# Patient Record
Sex: FEMALE | Race: WHITE | NOT HISPANIC OR LATINO | Employment: OTHER | URBAN - METROPOLITAN AREA
[De-identification: names, ages, dates, MRNs, and addresses within clinical notes are randomized per-mention and may not be internally consistent; named-entity substitution may affect disease eponyms.]

---

## 2017-03-10 ENCOUNTER — TRANSCRIBE ORDERS (OUTPATIENT)
Dept: ADMINISTRATIVE | Facility: HOSPITAL | Age: 82
End: 2017-03-10

## 2017-03-10 DIAGNOSIS — Z13.9 SCREENING: Primary | ICD-10-CM

## 2017-03-15 ENCOUNTER — APPOINTMENT (OUTPATIENT)
Dept: CARDIOLOGY | Facility: HOSPITAL | Age: 82
End: 2017-03-15
Attending: INTERNAL MEDICINE

## 2017-05-15 ENCOUNTER — OFFICE VISIT (OUTPATIENT)
Dept: CARDIOLOGY | Facility: CLINIC | Age: 82
End: 2017-05-15

## 2017-05-15 VITALS
BODY MASS INDEX: 27.15 KG/M2 | WEIGHT: 173 LBS | DIASTOLIC BLOOD PRESSURE: 71 MMHG | HEART RATE: 69 BPM | SYSTOLIC BLOOD PRESSURE: 137 MMHG | HEIGHT: 67 IN

## 2017-05-15 DIAGNOSIS — I48.91 ATRIAL FIBRILLATION AND FLUTTER (HCC): ICD-10-CM

## 2017-05-15 DIAGNOSIS — M54.5 CHRONIC BILATERAL LOW BACK PAIN, WITH SCIATICA PRESENCE UNSPECIFIED: ICD-10-CM

## 2017-05-15 DIAGNOSIS — R07.2 PRECORDIAL PAIN: ICD-10-CM

## 2017-05-15 DIAGNOSIS — G89.29 CHRONIC BILATERAL LOW BACK PAIN, WITH SCIATICA PRESENCE UNSPECIFIED: ICD-10-CM

## 2017-05-15 DIAGNOSIS — M54.2 CHRONIC NECK PAIN: ICD-10-CM

## 2017-05-15 DIAGNOSIS — K21.9 GASTROESOPHAGEAL REFLUX DISEASE WITHOUT ESOPHAGITIS: ICD-10-CM

## 2017-05-15 DIAGNOSIS — R07.9 CHEST PAIN AT REST: Primary | ICD-10-CM

## 2017-05-15 DIAGNOSIS — R06.02 SHORTNESS OF BREATH: ICD-10-CM

## 2017-05-15 DIAGNOSIS — I25.10 CAD IN NATIVE ARTERY: ICD-10-CM

## 2017-05-15 DIAGNOSIS — R49.0 HOARSENESS: ICD-10-CM

## 2017-05-15 DIAGNOSIS — M79.602 LEFT ARM PAIN: ICD-10-CM

## 2017-05-15 DIAGNOSIS — R05.9 COUGH: ICD-10-CM

## 2017-05-15 DIAGNOSIS — I49.3 VENTRICULAR PREMATURE BEATS: ICD-10-CM

## 2017-05-15 DIAGNOSIS — R42 DIZZINESS: ICD-10-CM

## 2017-05-15 DIAGNOSIS — I48.0 PAROXYSMAL ATRIAL FIBRILLATION (HCC): ICD-10-CM

## 2017-05-15 DIAGNOSIS — E04.9 GOITER: ICD-10-CM

## 2017-05-15 DIAGNOSIS — G89.29 CHRONIC NECK PAIN: ICD-10-CM

## 2017-05-15 DIAGNOSIS — I10 ESSENTIAL HYPERTENSION: ICD-10-CM

## 2017-05-15 DIAGNOSIS — R00.2 PALPITATIONS: ICD-10-CM

## 2017-05-15 DIAGNOSIS — I48.92 ATRIAL FIBRILLATION AND FLUTTER (HCC): ICD-10-CM

## 2017-05-15 PROBLEM — M75.40 IMPINGEMENT SYNDROME OF SHOULDER REGION: Status: ACTIVE | Noted: 2017-03-22

## 2017-05-15 PROBLEM — R29.898 OTHER SYMPTOMS AND SIGNS INVOLVING THE MUSCULOSKELETAL SYSTEM: Status: ACTIVE | Noted: 2017-03-02

## 2017-05-15 PROBLEM — R20.2 PARESTHESIA OF LOWER EXTREMITY: Status: ACTIVE | Noted: 2017-03-02

## 2017-05-15 PROCEDURE — 93000 ELECTROCARDIOGRAM COMPLETE: CPT | Performed by: INTERNAL MEDICINE

## 2017-05-15 PROCEDURE — 99214 OFFICE O/P EST MOD 30 MIN: CPT | Performed by: INTERNAL MEDICINE

## 2017-06-07 ENCOUNTER — TRANSCRIBE ORDERS (OUTPATIENT)
Dept: ADMINISTRATIVE | Facility: HOSPITAL | Age: 82
End: 2017-06-07

## 2017-06-07 DIAGNOSIS — R13.10 DYSPHAGIA, UNSPECIFIED TYPE: Primary | ICD-10-CM

## 2017-06-12 ENCOUNTER — HOSPITAL ENCOUNTER (OUTPATIENT)
Dept: GENERAL RADIOLOGY | Facility: HOSPITAL | Age: 82
Discharge: HOME | End: 2017-06-12
Attending: OTOLARYNGOLOGY | Admitting: OTOLARYNGOLOGY

## 2017-06-12 DIAGNOSIS — R13.10 DYSPHAGIA, UNSPECIFIED TYPE: ICD-10-CM

## 2017-06-12 PROCEDURE — 92611 MOTION FLUOROSCOPY/SWALLOW: CPT

## 2017-06-12 PROCEDURE — G8996 SWALLOW CURRENT STATUS: HCPCS

## 2017-06-12 PROCEDURE — G8998 SWALLOW D/C STATUS: HCPCS

## 2017-06-12 PROCEDURE — G8997 SWALLOW GOAL STATUS: HCPCS

## 2017-06-12 PROCEDURE — 74230 X-RAY XM SWLNG FUNCJ C+: CPT

## 2017-06-12 NOTE — THERAPY EVALUATION
"Outpatient Speech Language Pathology   Adult Swallow Initial Evaluation  [facility]     Patient Name: Marce Williamson  : 1935  MRN: [id number]  Today's Date: 2017       SPEECH-LANGUAGE PATHOLOGY EVALUTION - VFSS  Subjective: The patient was seen on this date for a VFSS(Videofluoroscopic Swallowing Study).  Patient was alert and cooperative.    The patient's history is significant for thyroid cysts and worsening dysphagia since cervical spine surgery. Pt indicated the following: \"trouble swallowing\", \"ringing in my ear\", \"at times swallowing is worst than others\", throat \"burns\", \"hoarse voice for several months\", and \"multiple swallows\".   Objective: Risks/benefits were reviewed with the patient and family, and consent was obtained. The study was completed with SLP present and Radiologist review. The patient was seen in lateral view(s). Textures given included thin liquid, nectar thick liquid, puree consistency, mechanical soft consistency and regular consistency.  Assessment: Difficulties were noted with thin liquid and regular consistency. Esophageal screen was performed. Esophageal screen was unremarkable.     SLP Findings: Pt presents with moderate oropharyngeal dysphagia. Pt with hx of thryoid cysts and cervical spine surgery. Pt has been c/o dysphagia with liquids and solids for several years, however dysfunction worsened following cervical spine surgery. Pt exhibited deep transient penetration before and during the swallow without aspiration with thins via cup d/t swallow mistiming, reduced base of lingual strength, and poor laryngeal vestibule closure. Non-transient penetration with thins via straw without cough. Pt with mild pharyngeal residue post swallow, which patient sensated and cleared. Intermittent penetration with nectar via straw. Deep penetration to the level of the true cords with thins via cup as liquid wash with regular. Pt with mild-moderate residue with regular at " valleculae and pyriforms post swallow, pt sensated and cleared with a liquid wash.    Comments: Pt would benefit from outpatient dysphagia therapy.  Recommendations: Diet Textures: thin liquid, regular consistency food. Medications should be taken whole with puree.   Recommended Strategies: clear mouth of food before taking a drink, alternate liquid and solids,, Upright for PO, small bites and sips and no straw. Oral care before breakfast, after all meals and PRN.  Other Recommended Evaluations: Referral to outpatient speech therapy    Dysphagia therapy is recommended. Rationale: to improve swallow function and reduce risk of asp.        Visit Date: 06/12/2017   Patient Active Problem List   Diagnosis   • Coronary arteriosclerosis in native artery   • Chronic coronary artery disease   • Chest pain   • Palpitations   • Hypertension   • Ventricular premature beats   • Shortness of breath   • Disc disorder of cervical region   • Cervical radiculopathy   • Low back pain   • Pain of lower extremity   • Chronic neck pain   • Pain in thoracic spine   • Cough   • Degeneration of intervertebral disc of lumbar region   • Degeneration of intervertebral disc of thoracic region   • Degeneration of intervertebral disc of thoracolumbar region   • Dizziness   • Dysphagia   • Gastritis   • Gastroesophageal reflux disease without esophagitis   • Goiter   • Left arm pain   • Paroxysmal atrial fibrillation   • Degenerative cervical spinal stenosis   • Atrial fibrillation   • Epigastric pain   • Abdominal pain   • Other spondylosis with radiculopathy, cervical region   • Chronic constipation   • Non-specific colitis   • Other symptoms and signs involving the musculoskeletal system   • Diverticulosis of intestine   • Elevated troponin I level   • Flatback syndrome   • Impingement syndrome of shoulder region   • Irritable bowel syndrome   • Left lower quadrant pain   • Nausea   • Paresthesia of lower extremity   • Osteoarthritis of knee    • Pulmonary venous congestion   • Rotator cuff tear arthropathy   • Acquired deformity of spine        Past Medical History:   Diagnosis Date   • Arthritis    • Difficulty swallowing    • Headache    • History of transfusion     AFTER HIATAL HERNIA SX   • Hypertension    • Irregular cardiac rhythm    • Polyp of sigmoid colon    • Throat mass     CYST ON RIGHT SIDE        Past Surgical History:   Procedure Laterality Date   • ANTERIOR CERVICAL DISCECTOMY W/ FUSION N/A 4/13/2016    Procedure: C-4-C-6 CERVICAL DISCECTOMY ANTERIOR FUSION WITH INSTRUMENTATION;  Surgeon: Blaine Jim DO;  Location: [facility] OR;  Service:    • BACK SURGERY      Dr. Garcia /Dr. Warren   • HERNIA REPAIR     • HYSTERECTOMY     • KNEE SURGERY     • SHOULDER SURGERY           Visit Dx:     ICD-10-CM ICD-9-CM   1. Dysphagia, unspecified type R13.10 787.20                   Adult Dysphagia - 06/12/17 1000     Adult Dysphagia Background    Patient Description of Complaint trouble swallowing, PT WITHOUT C/O PAIN (0)  -SA    Date of Onset several years  -SA    Symptoms Reported by Patient Coughing;Choking;Difficulty swallowing solids;Difficulty swallowing liquids;Food gets stuck  -SA    Patient Report of Functional Impact fearful to eat  -SA    History Pertinent to Diagnosis dysphagia worsened following spine surgery  -SA    Foods/Liquids Presented    Method of Administration Spoon;Cup;Straw;Self-fed  -SA    SLP Communication to Radiology    Severity Level of Dysphagia moderate dysphagia  -SA    Consistencies Aspirated/Penetrated penetrated:;thin  -SA    Summary Statement Pt presents with moderate oropharyngeal dysphagia. Pt with hx of thryoid cysts and cervical spine surgery. Pt has been c/o dysphagia with liquids and solids for several years, however dysfunction worsened following cervical spine surgery. Pt exhibited deep transient penetraiton with thins via cup d/t swallow mistiming, reduced base of lingual strength, and poor laryngeal  vestibule closure. Non-transient penetration with thins via straw without cough. Pt with mild pharyngeal residue post swallow, which patient sensated and cleared. Intermittent penetration with nectar via straw. Deep penetration to the level of the true cords with thins via cup as liquid wash with regular. Pt with mild-moderate residue with regular at valleculae and pyriforms post swallow, pt sensated and cleared with a liquid wash.   -SA    Results/Recs/Barriers/Education for Dysphagia    Factors Affecting Performance no difficulty participating in study  -SA    Learning Motivation strong  -SA    Clinical Impression: Swallowing Moderate:;oropharyngeal phase dysphagia  -SA    Prognosis Comment GOOD  -SA    Impact on Function risk for aspiration;risk for pneumonia  -SA    Swallowing Precautions no straw;small sips and bites when eating;alternate liquids and solids while eating;upright position for at least 30 minutes after meals;slow rate empty mouth between bites;medication administration (comment)  -SA    Frequency PER TREATING SLP  -SA    Duration PER TREATING SLP  -SA    Estimated Length of Session PER TREATING SLP  -SA      User Key  (r) = Recorded By, (t) = Taken By, (c) = Cosigned By    Initials Name Provider Type    SA Antonette Lynn Speech and Language Pathologist                            OP SLP Education       06/12/17 1035    Education    Barriers to Learning Hearing deficit  -SA    Action Taken to Address Barriers TEACH BACK  -SA    Education Provided Described results of evaluation;Patient expressed understanding of evaluation  -SA    Assessed Learning preferences;Learning readiness;Learning motivation;Learning needs  -SA    Learning Motivation Strong  -SA    Learning Method Teach back;Written materials;Demonstration;Explanation  -SA    Teaching Response Verbalized understanding  -SA      User Key  (r) = Recorded By, (t) = Taken By, (c) = Cosigned By    Initials Name Effective Dates    SA Whitman  Amparo Lynn 12/11/15 -                     OP SLP Assessment/Plan - 06/12/17 1034     SLP Assessment    Functional Problems Swallowing  -SA    Clinical Impression: Swallowing Moderate:  -SA    Functional Problems Comment DYSPHAGIA WITH LIQUIDS AND SOLIDS  -SA    Clinical Impression Comments ASP RISK WITH THINS  -SA    SLP Diagnosis MODERATE  -SA    Prognosis Good (comment)  -SA    Patient/caregiver participated in establishment of treatment plan and goals Yes  -SA    Patient would benefit from skilled therapy intervention Yes  -SA    SLP Plan    Frequency PER TREATING SLP  -SA    Duration PER TREATING SLP  -SA    Planned CPT's? SLP CLINICAL SWALLOW EVAL: 93843;SLP SWALLOW THERAPY: 14068  -    Plan Comments REPEAT MBS 2 MONTHS  -      User Key  (r) = Recorded By, (t) = Taken By, (c) = Cosigned By    Initials Name Provider Type     Antonette Lynn Speech and Language Pathologist              SLP Outcome Measures (last 72 hours)      SLP Outcome Measures       06/12/17 1000          SLP Outcome Measures    Outcome Measure Used? Adult NOMS  -      FCM Scores    FCM Chosen Swallowing  -      Swallowing FCM Score 5  -SA        User Key  (r) = Recorded By, (t) = Taken By, (c) = Cosigned By    Initials Name Effective Dates     Antonette Lynn 12/11/15 -                Time Calculation:        Therapy Charges for Today     Code Description Service Date Service Provider Modifiers Qty    16728655900 HC ST SWALLOWING CURRENT STATUS 6/12/2017 Antonette RIVAS CJ 1    54017642045 HC ST SWALLOWING PROJECTED 6/12/2017 EDIN Carnes 1    45604197203 HC ST SWALLOWING DISCHARGE 6/12/2017 EDIN Carnes 1    61819898619 HC ST MOTION FLUORO EVAL SWALLOW 4 6/12/2017 Antonette RIVAS 1          SLP G-Codes  Functional Limitations: Swallowing  Swallow Current Status (): At least 20 percent but less than 40 percent impaired, limited or restricted  Swallow Goal  Status (): At least 20 percent but less than 40 percent impaired, limited or restricted  Swallow Discharge Status (): At least 20 percent but less than 40 percent impaired, limited or restricted        Antonette Lynn  6/12/2017

## 2017-06-13 RX ORDER — ACEBUTOLOL HYDROCHLORIDE 200 MG/1
CAPSULE ORAL
Qty: 90 CAPSULE | Refills: 3 | Status: SHIPPED | OUTPATIENT
Start: 2017-06-13 | End: 2020-07-07 | Stop reason: SDUPTHER

## 2017-10-10 ENCOUNTER — HOSPITAL ENCOUNTER (EMERGENCY)
Facility: HOSPITAL | Age: 82
Discharge: HOME | End: 2017-10-10
Attending: EMERGENCY MEDICINE | Admitting: EMERGENCY MEDICINE

## 2017-10-10 VITALS
SYSTOLIC BLOOD PRESSURE: 123 MMHG | WEIGHT: 170 LBS | HEIGHT: 66 IN | HEART RATE: 62 BPM | DIASTOLIC BLOOD PRESSURE: 55 MMHG | OXYGEN SATURATION: 99 % | TEMPERATURE: 97.8 F | RESPIRATION RATE: 18 BRPM | BODY MASS INDEX: 27.32 KG/M2

## 2017-10-10 DIAGNOSIS — M53.3 CHRONIC COCCYGEAL PAIN: Primary | ICD-10-CM

## 2017-10-10 DIAGNOSIS — G89.29 CHRONIC COCCYGEAL PAIN: Primary | ICD-10-CM

## 2017-10-10 PROCEDURE — 99283 EMERGENCY DEPT VISIT LOW MDM: CPT

## 2017-10-10 NOTE — ED PROVIDER NOTES
EMERGENCY DEPARTMENT ENCOUNTER    CHIEF COMPLAINT  Chief Complaint: back pain  History given by: patient  History limited by: patient is vague historian  Room Number: 01/01  PMD: Miki Khalil MD  Spine Specialist- Dr Moncada, Dr Jim   Pain Management- [facility]      HPI:  Pt is a 82 y.o. female who presents with worsening of chronic coccygeal back pain radiating intermittently to the rectum and BLE that started about 1 month ago. It is exacerbated by movement, lying in supine position, and having a bowel movement. She has also had worsening of chronic constipation, worsening of chronic urinary frequency, no change in her chronic bladder incontinence, and chronic intermittent BLE tingling. She denies recent injury or trauma, bowel incontinence, saddle anesthesia, pain and difficulty with urination, abd pain, N/V/D, fevers, chills, chest pain, and trouble breathing. She has taken hydrocodone occasionally for her pain without significant relief. She reports that she underwent jen placement/fusion in her lower back in early 2000s and had an outpatient MRI l-spine about 1 month ago (results pending). She notes that she was recently started on linzess for her chronic constipation and is scheduled to undergo epidural to her coccygeal back on 10/18/17. Pt has no other complaints at this time.     Location: coccygeal back  Radiation: intermittently to rectum and BLE  Quality: pain  Intensity/Severity: moderate  Duration: started about 1 month ago  Onset quality: gradual  Timing: intermittent  Progression: worsening  Aggravating Factors: movement, lying in supine position, having bowel movement  Alleviating Factors: none  Previous Episodes: Pt states that her coccygeal back pain is chronic.   Treatment before arrival: Pt reports that she has taken hydrocodone occasionally for her pain without significant relief.  Associated Symptoms: worsening of chronic constipation, worsening of chronic urinary  frequency, no change in chronic bladder incontinence, chronic intermittent BLE tingling      PAST MEDICAL HISTORY  Active Ambulatory Problems     Diagnosis Date Noted   • Coronary arteriosclerosis in native artery 02/09/2016   • Chronic coronary artery disease 02/09/2016   • Chest pain 02/09/2016   • Palpitations 02/09/2016   • Hypertension 02/09/2016   • Ventricular premature beats 02/09/2016   • Shortness of breath 02/09/2016   • Disc disorder of cervical region 02/09/2016   • Cervical radiculopathy 02/09/2016   • Low back pain 02/09/2016   • Pain of lower extremity 02/09/2016   • Chronic neck pain 02/09/2016   • Pain in thoracic spine 02/09/2016   • Cough 02/09/2016   • Degeneration of intervertebral disc of lumbar region 02/09/2016   • Degeneration of intervertebral disc of thoracic region 02/09/2016   • Degeneration of intervertebral disc of thoracolumbar region 02/09/2016   • Dizziness 02/09/2016   • Dysphagia 02/09/2016   • Gastritis 02/09/2016   • Gastroesophageal reflux disease without esophagitis 02/09/2016   • Goiter 02/09/2016   • Left arm pain 02/09/2016   • Paroxysmal atrial fibrillation 02/23/2016   • Degenerative cervical spinal stenosis 04/13/2016   • Atrial fibrillation 02/14/2016   • Epigastric pain 07/30/2013   • Abdominal pain 01/24/2014   • Other spondylosis with radiculopathy, cervical region 01/07/2016   • Chronic constipation 07/07/2015   • Non-specific colitis 10/06/2015   • Other symptoms and signs involving the musculoskeletal system 03/02/2017   • Diverticulosis of intestine 10/06/2015   • Elevated troponin I level 02/15/2016   • 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   • Impingement syndrome of shoulder region 03/22/2017   • Irritable bowel syndrome 01/23/2013   • Left lower quadrant pain 08/27/2013   • Nausea 07/30/2013   • Paresthesia of lower extremity 03/02/2017   • Osteoarthritis of knee 07/22/2014   • Pulmonary venous congestion 02/14/2016   • Rotator cuff tear arthropathy 05/13/2015   •  Acquired deformity of spine 08/16/2012     Resolved Ambulatory Problems     Diagnosis Date Noted   • No Resolved Ambulatory Problems     Past Medical History:   Diagnosis Date   • Arthritis    • Difficulty swallowing    • Headache    • History of transfusion    • Hypertension    • Irregular cardiac rhythm    • Polyp of sigmoid colon    • Throat mass          PAST SURGICAL HISTORY  Past Surgical History:   Procedure Laterality Date   • ANTERIOR CERVICAL DISCECTOMY W/ FUSION N/A 4/13/2016    Procedure: C-4-C-6 CERVICAL DISCECTOMY ANTERIOR FUSION WITH INSTRUMENTATION;  Surgeon: Blaine Jim DO;  Location: [facility];  Service:    • BACK SURGERY      Dr. Gracia /Dr. Warren   • HERNIA REPAIR     • HYSTERECTOMY     • KNEE SURGERY     • SHOULDER SURGERY           FAMILY HISTORY  Family History   Problem Relation Age of Onset   • Heart failure Mother      CONGESTIVE   • Breast cancer Father    • Emphysema Father    • Hypertension Father    • Breast cancer Sister    • Other Sister      POLIO, 1963   • Diabetes Son    • Pancreatic cancer Son          SOCIAL HISTORY  Social History     Social History   • Marital status:      Spouse name: N/A   • Number of children: N/A   • Years of education: N/A     Occupational History   • Not on file.     Social History Main Topics   • Smoking status: Never Smoker   • Smokeless tobacco: Not on file   • Alcohol use No   • Drug use: No   • Sexual activity: Defer     Other Topics Concern   • Not on file     Social History Narrative   • No narrative on file         ALLERGIES  Keflex  [cephalexin]; Metoclopramide; Lidocaine; and Pentazocine        REVIEW OF SYSTEMS  Review of Systems   Unable to perform ROS: Other (pt is vague historian)   Constitutional: Negative for chills and fever.   Respiratory: Negative for shortness of breath.    Cardiovascular: Negative for chest pain.   Gastrointestinal: Positive for constipation (chronic, worse). Negative for abdominal pain, diarrhea, nausea  and vomiting.        No bowel incontinence   Genitourinary: Positive for frequency (chronic, worse). Negative for difficulty urinating and dysuria.        Stable chronic bladder incontinence   Musculoskeletal: Positive for back pain (worsening of chronic coccygeal back pain radiating intermittently to rectum and BLE).   Neurological:        Chronic intermittent BLE tingling, no saddle anesthesia            PHYSICAL EXAM  ED Triage Vitals   Temp Heart Rate Resp BP SpO2   10/10/17 1328 10/10/17 1328 10/10/17 1328 10/10/17 1328 10/10/17 1328   98.2 °F (36.8 °C) 73 18 135/86 96 % WNL      Temp src Heart Rate Source Patient Position BP Location FiO2 (%)   10/10/17 1328 10/10/17 1328 10/10/17 1328 10/10/17 1455 --   Tympanic Monitor Sitting Right arm        Physical Exam   Constitutional: She is oriented to person, place, and time. No distress.   HENT:   Head: Normocephalic.   Neck: Normal range of motion. Neck supple.   Cardiovascular: Normal rate, regular rhythm and normal heart sounds.    Pulmonary/Chest: Effort normal and breath sounds normal. No respiratory distress.   Abdominal: Soft. There is no tenderness. There is no rebound and no guarding.   Musculoskeletal:   No l-spine tenderness, tenderness over the coccyx, negative straight leg raises bilaterally, NV intact distally to BLE   Neurological: She is alert and oriented to person, place, and time. She has normal motor skills and normal sensation.   Sensation and motor function intact to BLE   Skin: Skin is warm and dry.   Nursing note and vitals reviewed.          PROCEDURES  Procedures      PROGRESS AND CONSULTS  ED Course     3:38 PM- I offered to order CT abd/pel for further evaluation of pt's coccygeal back pain. I informed pt that her sx could possibly be due to serious etiology including a tumor. Pt verbalizes understanding but declines CT abd/pel, stating that she does not want radiation exposure. Informed pt of dx and plan for discharge. Advised pt to  continue taking hydrocodone for pain as prescribed. Instructed to f/u with Dr Moncada (spine specialist) and [facility] for further management and for further testing/treatment as needed. RTER warnings given. Pt understands and agrees with plan. Addressed all questions.    3:41 PM- Discussed case with Dr Haines who agrees with the plan of care.       MEDICAL DECISION MAKING    MDM  Number of Diagnoses or Management Options  Chronic coccygeal pain:      Amount and/or Complexity of Data Reviewed  Decide to obtain previous medical records or to obtain history from someone other than the patient: yes  Review and summarize past medical records: yes (Pt is seen by Dr Moncada at [facility] for chronic back pain. )    Patient Progress  Patient progress: stable             DIAGNOSIS  Final diagnoses:   Chronic coccygeal pain         DISPOSITION  Discharged    DISCHARGE    Patient discharged in stable condition.    Reviewed implications of diagnosis, meds, responsibility to follow up, warning signs and symptoms of possible worsening, potential complications and reasons to return to ER.    Patient voiced understanding of above instructions.    Discussed plan for discharge, as there is no emergent indication for admission.  Pt is agreeable and understands need for follow up and repeat testing.  Pt is aware that discharge does not mean that nothing is wrong but it indicates no emergency is present and they must continue care with follow-up as given below or physician of their choice.     FOLLOW-UP  Tahir Moncada III, MD  [facility] [address] [address] [phone]    Schedule an appointment as soon as possible for a visit      [facility]  [address] [place] [id number] [phone]    as directed        Latest Documented Vital Signs:  As of 4:07 PM  BP- 142/79 HR- 71 Temp- 97.8 °F (36.6 °C) (Oral) O2 sat- 96%        --  Documentation  assistance provided by letty Farrell for LASHAUN Blanca.  Information recorded by the timboibhelen was done at my direction and has been verified and validated by me.       Denise Farrell  10/10/17 1612       LASHAUN Wilson  10/10/17 1712

## 2017-10-10 NOTE — ED PROVIDER NOTES
Pt is an 82 y.o. Female complaining of worsening chronic coccygeal back pain. She states the pain now radiates into her rectum and BLE. Pt states her pain is worsened with laying down. Pt reports having pain injections in the past which alleviates her sx and is scheduled for another injection next week. On exam, mild tenderness of her coccyx, HRRR, lungs CTAB, abd benign, nl str and sensation in legs.      Pt declines imaging studies.    I agree with the plan to discharge and for follow up with pain management.    I supervised care provided by the midlevel provider.    We have discussed this patient's history, physical exam, and treatment plan.   I have reviewed the note and personally saw and examined the patient and agree with the plan of care.    Documentation assistance provided by letty Marie for Dr. Haines.  Information recorded by the scribe was done at my direction and has been verified and validated by me.       Rubin Marie  10/10/17 1557       López Haines MD  10/11/17 0981

## 2018-02-12 ENCOUNTER — TRANSCRIBE ORDERS (OUTPATIENT)
Dept: PHYSICAL THERAPY | Facility: HOSPITAL | Age: 83
End: 2018-02-12

## 2018-02-12 DIAGNOSIS — M54.5 LOW BACK PAIN, UNSPECIFIED BACK PAIN LATERALITY, UNSPECIFIED CHRONICITY, WITH SCIATICA PRESENCE UNSPECIFIED: Primary | ICD-10-CM

## 2018-02-12 DIAGNOSIS — M54.10 RADICULAR SYNDROME OF LEFT LOWER EXTREMITY: ICD-10-CM

## 2018-02-16 ENCOUNTER — HOSPITAL ENCOUNTER (OUTPATIENT)
Dept: PHYSICAL THERAPY | Facility: HOSPITAL | Age: 83
Setting detail: THERAPIES SERIES
Discharge: HOME | End: 2018-02-16

## 2018-02-16 DIAGNOSIS — M54.5 CHRONIC LOW BACK PAIN, UNSPECIFIED BACK PAIN LATERALITY, WITH SCIATICA PRESENCE UNSPECIFIED: Primary | ICD-10-CM

## 2018-02-16 DIAGNOSIS — G89.29 CHRONIC LOW BACK PAIN, UNSPECIFIED BACK PAIN LATERALITY, WITH SCIATICA PRESENCE UNSPECIFIED: Primary | ICD-10-CM

## 2018-02-16 PROCEDURE — 97110 THERAPEUTIC EXERCISES: CPT | Performed by: PHYSICAL THERAPIST

## 2018-02-16 PROCEDURE — G8982 BODY POS GOAL STATUS: HCPCS | Performed by: PHYSICAL THERAPIST

## 2018-02-16 PROCEDURE — G8981 BODY POS CURRENT STATUS: HCPCS | Performed by: PHYSICAL THERAPIST

## 2018-02-16 PROCEDURE — 97161 PT EVAL LOW COMPLEX 20 MIN: CPT | Performed by: PHYSICAL THERAPIST

## 2018-02-16 NOTE — THERAPY EVALUATION
Outpatient Physical Therapy Ortho Initial Evaluation  [facility]     Patient Name: Marce Williamson  : 1935  MRN: [id number]  Today's Date: 2018      Visit Date: 2018    Patient Active Problem List   Diagnosis   • Coronary arteriosclerosis in native artery   • Chronic coronary artery disease   • Chest pain   • Palpitations   • Hypertension   • Ventricular premature beats   • Shortness of breath   • Disc disorder of cervical region   • Cervical radiculopathy   • Low back pain   • Pain of lower extremity   • Chronic neck pain   • Pain in thoracic spine   • Cough   • Degeneration of intervertebral disc of lumbar region   • Degeneration of intervertebral disc of thoracic region   • Degeneration of intervertebral disc of thoracolumbar region   • Dizziness   • Dysphagia   • Gastritis   • Gastroesophageal reflux disease without esophagitis   • Goiter   • Left arm pain   • Paroxysmal atrial fibrillation   • Degenerative cervical spinal stenosis   • Atrial fibrillation   • Epigastric pain   • Abdominal pain   • Other spondylosis with radiculopathy, cervical region   • Chronic constipation   • Non-specific colitis   • Other symptoms and signs involving the musculoskeletal system   • Diverticulosis of intestine   • Elevated troponin I level   • Flatback syndrome   • Impingement syndrome of shoulder region   • Irritable bowel syndrome   • Left lower quadrant pain   • Nausea   • Paresthesia of lower extremity   • Osteoarthritis of knee   • Pulmonary venous congestion   • Rotator cuff tear arthropathy   • Acquired deformity of spine        Past Medical History:   Diagnosis Date   • Arthritis    • Difficulty swallowing    • Headache    • History of transfusion     AFTER HIATAL HERNIA SX   • Hypertension    • Irregular cardiac rhythm    • Polyp of sigmoid colon    • Throat mass     CYST ON RIGHT SIDE        Past Surgical History:   Procedure Laterality Date   • ANTERIOR CERVICAL DISCECTOMY W/ FUSION  N/A 4/13/2016    Procedure: C-4-C-6 CERVICAL DISCECTOMY ANTERIOR FUSION WITH INSTRUMENTATION;  Surgeon: Blaine Jim DO;  Location: [facility];  Service:    • BACK SURGERY      Dr. Garcia /Dr. Warren   • HERNIA REPAIR     • HYSTERECTOMY     • KNEE SURGERY     • SHOULDER SURGERY         Visit Dx:     ICD-10-CM ICD-9-CM   1. Chronic low back pain, unspecified back pain laterality, with sciatica presence unspecified M54.5 724.2    G89.29 338.29             Patient History       02/16/18 1400          History    Chief Complaint Difficulty Walking;Balance Problems;Difficulty with daily activities;Joint stiffness;Joint swelling;Muscle tenderness;Muscle weakness;Pain  -LC      Type of Pain Back pain;Lower Extremity / Leg;Hip pain  -      Date Current Problem(s) Began --   chronic  -LC      Brief Description of Current Complaint Pt is an 83 y.o. female who presents to PT with dx of chronic low back pain and per pt report she has failed lumbar fusion currently that is unable to be repaired at this time. She reports referred sciatic pain in BLE's and pain radiating from lumbar spine specifically L1 up into thoracic spine and radiating to arms. She reports multiple joint problems in B hips and knees as well as reported pain in her rectum that she associates with back pain. She indicates repeatedly that she has multiple xrays, mris, and surgical records that indicate the multitude of medical issues she has going on all throughout her body. She reports a varied history of exercise and rehabiliation stating that no PT she's ever had has given HEP to continue to performing after she leaves therapy. She reports multiple episodes of pain management where she received injections in her back.  -LC      Onset Date- PT 2/16/18  -      Patient/Caregiver Goals Return to prior level of function;Relieve pain;Improve mobility;Improve strength;Know what to do to help the symptoms  -      Patient's Rating of General Health Fair  -       Occupation/sports/leisure activities retired from [employer]. States her 87 year old spouse does most of the cooking, shopping, and house work due to her disabilities  -LC      How has patient tried to help current problem? heat, medicated pain relief patches  -LC      Pain     Pain Location Back;Leg   low back, thoracic, upper extremity  -LC      Pain at Present 8  -LC      Pain at Best 8  -LC      Pain at Worst 10  -LC      Pain Frequency Constant/continuous  -LC      Pain Description Patient unable to describe  -LC      What Performance Factors Make the Current Problem(s) WORSE? any activity  -LC      Is your sleep disturbed? Yes  -LC      Difficulties with ADL's? any activity pt indicates she has difficulty with  -LC      Fall Risk Assessment    Any falls in the past year: Yes  -LC      Number of falls reported in the last 12 months 1  -LC      Factors that contributed to the fall: Lost balance  -LC      Does patient have a fear of falling No  -LC      Services    Prior Rehab/Home Health Experiences Yes  -LC      When was the prior experience with Rehab/Home Health multiple times over past several years  -LC      Where was the prior experience with Rehab/Home Health outpatient rehab  -LC      Are you currently receiving Home Health services No  -LC      Daily Activities    Primary Language English  -LC      Pt Participated in POC and Goals Yes  -LC      Safety    Are you being hurt, hit, or frightened by anyone at home or in your life? No  -LC      Are you being neglected by a caregiver No  -LC        User Key  (r) = Recorded By, (t) = Taken By, (c) = Cosigned By    Initials Name Provider Type    RICARDA Mittal, PT DPT Physical Therapist                PT Ortho       02/16/18 1400    Lumbar/SI Special Tests    FAIR Test (Piriformis Syndrome) Bilateral:;Positive  -LC    Left Knee    Extension/Flexion ROM Details 0 to 110  -LC    Right Knee    Extension/Flexion ROM Details 0 to 90  -LC    Left Hip    Hip Flexion  "Gross Movement (3/5) fair  -LC    Hip ABduction Gross Movement (3/5) fair  -LC    Hip ADduction Gross Movement (3/5) fair  -LC    Right Hip    Hip Flexion Gross Movement (3/5) fair  -LC    Hip ABduction Gross Movement (3/5) fair  -LC    Hip ADduction Gross Movement (3/5) fair  -LC    Left Knee    Knee Extension Gross Movement (3/5) fair  -LC    Knee Flexion Gross Movement (3/5) fair  -LC    Right Knee    Knee Extension Gross Movement (3/5) fair  -LC    Knee Flexion Gross Movement (3/5) fair  -LC    Lower Extremity Flexibility    Hamstrings Bilateral:;Moderately limited  -LC      User Key  (r) = Recorded By, (t) = Taken By, (c) = Cosigned By    Initials Name Provider Type    RICARDA Mittal, PT DPT Physical Therapist                      Therapy Education  Given: HEP, Symptoms/condition management, Pain management  Program: New  How Provided: Verbal, Demonstration, Written  Provided to: Patient  Level of Understanding: Teach back education performed, Verbalized, Demonstrated           PT OP Goals       02/16/18 1500       PT Short Term Goals    STG 1 Pt will be independent with HEP to strengthen and stretch low back and BLEs to allow for improve functional activity tolerance.  -     STG 1 Progress New  -     STG 2 Pt will demonstrate BLE MMT grossly 4/5.  -     STG 2 Progress New  -     STG 3 Pt will report pain no greater than 6/10 in low back.  -     STG 3 Progress New  -     STG 4 Pt will perform step up on 8\" step and perform sit to stand without use of arms.  -     STG 4 Progress New  -     STG 5 Pt will report 10% improvement on oswestry.  -     STG 5 Progress New  -     Time Calculation    PT Goal Re-Cert Due Date 03/16/18  -       User Key  (r) = Recorded By, (t) = Taken By, (c) = Cosigned By    Initials Name Provider Type    RICARDA Mittal, PT DPT Physical Therapist                PT Assessment/Plan       02/16/18 1515       PT Assessment    Functional Limitations Performance in " self-care ADL;Performance in leisure activities;Limitations in functional capacity and performance;Impaired gait  -     Impairments Balance;Gait;Endurance;Impaired flexibility;Muscle strength;Pain;Range of motion;Joint integrity;Joint mobility  -     Assessment Comments Pt is an 83 y.o. female who presents to PT with dx of chronic low back pain and per pt report she has failed lumbar fusion currently that is unable to be repaired at this time. She reports referred sciatic pain in BLE's and pain radiating from lumbar spine specifically L1 up into thoracic spine and radiating to arms. She reports multiple joint problems in B hips and knees as well as reported pain in her rectum that she associates with back pain. She indicates repeatedly that she has multiple xrays, mris, and surgical records that indicate the multitude of medical issues she has going on all throughout her body. She reports a varied history of exercise and rehabiliation stating that no PT she's ever had has given HEP to continue to performing after she leaves therapy. She reports multiple episodes of pain management where she received injections in her back. She has BLE MMT grossly 3/5. She has B moderate hamstring tightness. She has positive piriformis sign and positive neural tension bilaterally. Pt was educated on very basic BLE strengthening therex and 1 stretch to improve BLE and lumbar flexibility. She demonstrated all exercises correctly. Pt required frequent redirection to perform exercises as she continued to discuss multiple surgical history and reported rectal pain. Pt encouraged to followup with internal medicine MD regarding rectal pain.  -LC     Please refer to paper survey for additional self-reported information Yes  -LC     Rehab Potential Fair  -LC     Patient/caregiver participated in establishment of treatment plan and goals Yes  -LC     Patient would benefit from skilled therapy intervention Yes  -LC     PT Plan    PT  Frequency 2x/week  -     Predicted Duration of Therapy Intervention (days/wks) 3 weeks  -     Planned CPT's? PT EVAL LOW COMPLEXITY: 17906;PT RE-EVAL: 32793;PT NEUROMUSC RE-EDUCATION EA 15 MIN: 22244;PT MANUAL THERAPY EA 15 MIN: 36648;PT THER ACT EA 15 MIN: 52946;PT THER PROC EA 15 MIN: 36009  -     Physical Therapy Interventions (Optional Details) home exercise program;patient/family education;lumbar stabilization;manual therapy techniques;strengthening;stretching;ROM (Range of Motion)  -     PT Plan Comments Pt requires skilled therapy to improve overally strength and activity tolerance to allow improved functional ability in ADL's.  -LC       User Key  (r) = Recorded By, (t) = Taken By, (c) = Cosigned By    Initials Name Provider Type    RICARDA Mittal, PT DPT Physical Therapist                  Exercises       02/16/18 1500          Exercise 1    Exercise Name 1 SLR  -LC      Sets 1 2  -LC      Reps 1 8  -LC      Exercise 2    Exercise Name 2 bridge  -LC      Sets 2 2  -LC      Reps 2 5  -LC      Exercise 3    Exercise Name 3 hip ABDuction supine RTB  -LC      Sets 3 2  -LC      Reps 3 8  -LC      Exercise 4    Exercise Name 4 hip ADDuction supine  -LC      Sets 4 2  -LC      Reps 4 8  -LC      Time (Seconds) 4 3  -LC      Exercise 5    Exercise Name 5 HS stretch  -LC      Reps 5 5  -LC      Time (Seconds) 5 15  -LC        User Key  (r) = Recorded By, (t) = Taken By, (c) = Cosigned By    Initials Name Provider Type    RICARDA Mittal, PT DPT Physical Therapist                        Outcome Measure Options: Modifed Owestry  Modified Oswestry  Modified Oswestry Score/Comments: 76%      Time Calculation:   Start Time: 1430  Stop Time: 1515  Time Calculation (min): 45 min  Total Timed Code Minutes- PT: 45 minute(s)     Therapy Charges for Today     Code Description Service Date Service Provider Modifiers Qty    47581624723  PT CHNG MAIN POS CURRENT 2/16/2018 Marcial Mittal, PT DPT GP, CL 1     16484499488 HC PT CHNG MAIN POS PROJECTED 2/16/2018 Marcial Mittal, PT DPT GP, CK 1    39887126899 HC PT EVAL LOW COMPLEXITY 1 2/16/2018 Marcial Mittal, PT DPT GP 1    12732011597 HC PT THER PROC EA 15 MIN 2/16/2018 Marcial Mittal, PT DPT GP 2          PT G-Codes  PT Professional Judgement Used?: Yes  Outcome Measure Options: Germania Dunlap  Score: 76%  Functional Limitation: Changing and maintaining body position  Changing and Maintaining Body Position Current Status (): At least 60 percent but less than 80 percent impaired, limited or restricted  Changing and Maintaining Body Position Goal Status (): At least 40 percent but less than 60 percent impaired, limited or restricted         Marcial Mittal, PT DPT  2/16/2018

## 2018-02-20 ENCOUNTER — APPOINTMENT (OUTPATIENT)
Dept: PHYSICAL THERAPY | Facility: HOSPITAL | Age: 83
End: 2018-02-20

## 2018-04-16 ENCOUNTER — DOCUMENTATION (OUTPATIENT)
Dept: PHYSICAL THERAPY | Facility: HOSPITAL | Age: 83
End: 2018-04-16

## 2018-04-16 DIAGNOSIS — G89.29 CHRONIC LOW BACK PAIN, UNSPECIFIED BACK PAIN LATERALITY, WITH SCIATICA PRESENCE UNSPECIFIED: Primary | ICD-10-CM

## 2018-04-16 DIAGNOSIS — M54.5 CHRONIC LOW BACK PAIN, UNSPECIFIED BACK PAIN LATERALITY, WITH SCIATICA PRESENCE UNSPECIFIED: Primary | ICD-10-CM

## 2018-04-16 NOTE — THERAPY DISCHARGE NOTE
Outpatient Physical Therapy Ortho Progress Note/Discharge Summary       Patient Name: Marce Williamson  : 1935  MRN: [id number]  Today's Date: 2018      Visit Date: 2018    Visit Dx:    ICD-10-CM ICD-9-CM   1. Chronic low back pain, unspecified back pain laterality, with sciatica presence unspecified M54.5 724.2    G89.29 338.29       Patient Active Problem List   Diagnosis   • Coronary arteriosclerosis in native artery   • Chronic coronary artery disease   • Chest pain   • Palpitations   • Hypertension   • Ventricular premature beats   • Shortness of breath   • Disc disorder of cervical region   • Cervical radiculopathy   • Low back pain   • Pain of lower extremity   • Chronic neck pain   • Pain in thoracic spine   • Cough   • Degeneration of intervertebral disc of lumbar region   • Degeneration of intervertebral disc of thoracic region   • Degeneration of intervertebral disc of thoracolumbar region   • Dizziness   • Dysphagia   • Gastritis   • Gastroesophageal reflux disease without esophagitis   • Goiter   • Left arm pain   • Paroxysmal atrial fibrillation   • Degenerative cervical spinal stenosis   • Atrial fibrillation   • Epigastric pain   • Abdominal pain   • Other spondylosis with radiculopathy, cervical region   • Chronic constipation   • Non-specific colitis   • Other symptoms and signs involving the musculoskeletal system   • Diverticulosis of intestine   • Elevated troponin I level   • Flatback syndrome   • Impingement syndrome of shoulder region   • Irritable bowel syndrome   • Left lower quadrant pain   • Nausea   • Paresthesia of lower extremity   • Osteoarthritis of knee   • Pulmonary venous congestion   • Rotator cuff tear arthropathy   • Acquired deformity of spine        Past Medical History:   Diagnosis Date   • Arthritis    • Difficulty swallowing    • Headache    • History of transfusion     AFTER HIATAL HERNIA SX   • Hypertension    • Irregular cardiac rhythm    •  "Polyp of sigmoid colon    • Throat mass     CYST ON RIGHT SIDE        Past Surgical History:   Procedure Laterality Date   • ANTERIOR CERVICAL DISCECTOMY W/ FUSION N/A 4/13/2016    Procedure: C-4-C-6 CERVICAL DISCECTOMY ANTERIOR FUSION WITH INSTRUMENTATION;  Surgeon: Blaine Jim DO;  Location: [facility];  Service:    • BACK SURGERY      Dr. Garcia /Dr. Warren   • HERNIA REPAIR     • HYSTERECTOMY     • KNEE SURGERY     • SHOULDER SURGERY                               PT Assessment/Plan     Row Name 04/16/18 0917          PT Assessment    Functional Limitations Performance in self-care ADL;Performance in leisure activities;Limitations in functional capacity and performance;Impaired gait  -LC     Impairments Balance;Gait;Endurance;Impaired flexibility;Muscle strength;Pain;Range of motion;Joint integrity;Joint mobility  -     Assessment Comments Pt called and cancelled her remaining appointments due to continued pain and wish to return to MD to address symptoms.   -        PT Plan    PT Plan Comments Pt DC to continue followup with MD for further treatment of pain and medical problems.  -       User Key  (r) = Recorded By, (t) = Taken By, (c) = Cosigned By    Initials Name Provider Type    RICARDA Mittal, PT DPT Physical Therapist                                       PT OP Goals     Row Name 04/16/18 0900          PT Short Term Goals    STG 1 Pt will be independent with HEP to strengthen and stretch low back and BLEs to allow for improve functional activity tolerance.  -     STG 1 Progress Not Met  -LC     STG 2 Pt will demonstrate BLE MMT grossly 4/5.  -     STG 2 Progress Not Met  -LC     STG 3 Pt will report pain no greater than 6/10 in low back.  -     STG 3 Progress Not Met  -LC     STG 4 Pt will perform step up on 8\" step and perform sit to stand without use of arms.  -     STG 4 Progress Not Met  -LC     STG 5 Pt will report 10% improvement on oswestry.  -     STG 5 Progress Not Met  -LC  "      User Key  (r) = Recorded By, (t) = Taken By, (c) = Cosigned By    Initials Name Provider Type    RICARDA Mittal, PT DPT Physical Therapist                         Time Calculation:            PT G-Codes  PT Professional Judgement Used?: Yes  Functional Limitation: Changing and maintaining body position  Changing and Maintaining Body Position Goal Status (): At least 40 percent but less than 60 percent impaired, limited or restricted  Changing and Maintaining Body Position Discharge Status (): At least 60 percent but less than 80 percent impaired, limited or restricted     OP PT Discharge Summary  Date of Discharge: 04/16/18  Reason for Discharge: Patient/Caregiver request  Outcomes Achieved: Unable to make functional progress toward goals at this time, Refer to plan of care for updates on goals achieved  Discharge Destination: Home with home program  Discharge Instructions/Additional Comments: Pt instructed to contact PT with any questions regarding HEP.      Marcial Mittal, PT DPT  4/16/2018

## 2018-11-15 ENCOUNTER — OFFICE VISIT (OUTPATIENT)
Dept: PAIN MEDICINE | Facility: CLINIC | Age: 83
End: 2018-11-15

## 2018-11-15 VITALS
SYSTOLIC BLOOD PRESSURE: 160 MMHG | WEIGHT: 158.2 LBS | HEART RATE: 64 BPM | BODY MASS INDEX: 25.53 KG/M2 | RESPIRATION RATE: 18 BRPM | DIASTOLIC BLOOD PRESSURE: 72 MMHG | OXYGEN SATURATION: 95 %

## 2018-11-15 DIAGNOSIS — G89.29 CHRONIC NECK PAIN: ICD-10-CM

## 2018-11-15 DIAGNOSIS — Z79.899 ENCOUNTER FOR LONG-TERM (CURRENT) USE OF HIGH-RISK MEDICATION: Primary | ICD-10-CM

## 2018-11-15 DIAGNOSIS — M51.35 DEGENERATION OF INTERVERTEBRAL DISC OF THORACOLUMBAR REGION: ICD-10-CM

## 2018-11-15 DIAGNOSIS — M54.2 CHRONIC NECK PAIN: ICD-10-CM

## 2018-11-15 DIAGNOSIS — M79.602 LEFT ARM PAIN: ICD-10-CM

## 2018-11-15 LAB
POC AMPHETAMINES: NEGATIVE
POC BARBITURATES: NEGATIVE
POC BENZODIAZEPHINES: NEGATIVE
POC COCAINE: NEGATIVE
POC METHADONE: NEGATIVE
POC METHAMPHETAMINE SCREEN URINE: NEGATIVE
POC OPIATES: NEGATIVE
POC OXYCODONE: NEGATIVE
POC PHENCYCLIDINE: NEGATIVE
POC PROPOXYPHENE: NEGATIVE
POC THC: NEGATIVE
POC TRICYCLIC ANTIDEPRESSANTS: NEGATIVE

## 2018-11-15 PROCEDURE — 80305 DRUG TEST PRSMV DIR OPT OBS: CPT | Performed by: PAIN MEDICINE

## 2018-11-15 PROCEDURE — 99204 OFFICE O/P NEW MOD 45 MIN: CPT | Performed by: PAIN MEDICINE

## 2018-11-15 RX ORDER — MELOXICAM 15 MG/1
TABLET ORAL
COMMUNITY
Start: 2018-10-25 | End: 2019-07-18 | Stop reason: SDUPTHER

## 2018-11-15 RX ORDER — FENTANYL 25 UG/H
PATCH TRANSDERMAL
COMMUNITY
Start: 2018-10-29 | End: 2019-07-23 | Stop reason: ALTCHOICE

## 2018-11-15 RX ORDER — DICYCLOMINE HCL 20 MG
TABLET ORAL
Refills: 1 | COMMUNITY
Start: 2018-09-20 | End: 2019-07-23 | Stop reason: ALTCHOICE

## 2018-11-15 RX ORDER — NITROFURANTOIN 25; 75 MG/1; MG/1
CAPSULE ORAL
COMMUNITY
Start: 2018-11-06 | End: 2019-07-23 | Stop reason: ALTCHOICE

## 2018-11-15 RX ORDER — OMEPRAZOLE 20 MG/1
CAPSULE, DELAYED RELEASE ORAL
Refills: 3 | COMMUNITY
Start: 2018-10-31 | End: 2019-07-23 | Stop reason: SDUPTHER

## 2018-11-15 NOTE — PROGRESS NOTES
"CHIEF COMPLAINT: Back Pain    Marce Williamson is a 83 y.o. female.   He was referred here by Miki Khalil MD. He presents to the office for evaluation and treatment of Back Pain    HPI  Back Pain  Started about 20 years ago. Ms. Williamson states that she had lumbar fusion by Dr. Stringer in 2001 and another back surgery in 2002 by Dr. Warren to remove hardware. She states that she was told by Dr. Warren she won't be a candidate for any future back surgeries. Started pain management at that time. She states that she went to [facility] from 2002 until March 2018. Discharged from clinic for going to clinic to get injection after being rescheduled. States her pain was well controlled at that time with intermittent injections and fentanyl patch with norco bid for breakthrough. Prescribed to change every 48 hours by [facility] but states she only changed them eery 4-5 days and had a lot left over upon discharge.   She then went to [facility] from Aug 2018-Sept 2018. She was discharged from [facility] due to frequent no shows and same day cancellations- per chart review but patient is very persistent that she left him. At that time patient admitted to taking no prescribed medication.     She has seen neurosurgeon Dr. Tahir Moncada for her back pain on 8/2018 and told she wasn't a surgical candidate.    The patient states their pain is a 8 on a scale of 1-10.  The patient describes this pain as constant dull and ache.  The pain is located in bilateral low back and does radiate posterior/anterior bilateral legs down to feet. Also radiation into left groin. This painful problem is aggravated by sitting and laying down and is alleviated by TENS, past injection and pain medication.    Has history of neck pain. Underwent neck surgery by Dr. Jim. Went back for evaluation by Dr. Jim for evaluation last month who prescribed her another fentanyl due to \"felt sorry for me\".  Also " received pain injection into hip that day. Has worsening LUE numbness. States she did not see his pain physician due to already having apt with our office.     Past pain medications:   Fentanyl patch 25 mcg q 48 hrs    Current pain medications:   Fentanyl patch  norco     Past therapies:  Physical Therapy: yes  Chiropractor: yes  Massage Therapy: yes  TENS: yes (currently uses one)  Neck or back surgery: yes ( neck ACDF C6/C7 by Dr. Jim; and back surgery- L5/S1 fusion by Dr. Stringer in 2001 and hardware removal Dr. Warren in 2002)  Past pain management: yes(formerly [facility])    Previous Injections: 2 cervical epidurals and several back injections  Effect of Injection (%): they helped  Length of Relief:        PEG Assessment   What number best describes your pain on average in the past week? 9  What number best describes how, during the past week, pain has interfered with your enjoyment of life? 9  What number best describes how, during the past week, pain has interfered with your general activity? 9      Current Outpatient Medications:   •  acebutolol (SECTRAL) 200 MG capsule, TAKE 1 CAPSULE EVERY DAY, Disp: 90 capsule, Rfl: 3  •  dicyclomine (BENTYL) 20 MG tablet, TK 1 T PO Q 8 H, Disp: , Rfl: 1  •  fentaNYL (DURAGESIC) 25 MCG/HR patch, , Disp: , Rfl:   •  gabapentin (NEURONTIN) 300 MG capsule, Take 400 mg by mouth 3 (Three) Times a Day. TAKE 1 CAPSULE BY MOUTH EVERY NIGHT AT BEDTIME X 3 DAYS, THEN INCREASE BY 1 CAPSULE EVERY 3 DAYS, UNTIL DOSE OF THREE TIMES DAILY IS REACHED, Disp: , Rfl:   •  HYDROcodone-acetaminophen (NORCO) 7.5-325 MG per tablet, , Disp: , Rfl:   •  lisinopril (PRINIVIL,ZESTRIL) 5 MG tablet, take 1 tablet by mouth once daily, Disp: , Rfl: 0  •  meloxicam (MOBIC) 15 MG tablet, , Disp: , Rfl:   •  nitrofurantoin, macrocrystal-monohydrate, (MACROBID) 100 MG capsule, , Disp: , Rfl:   •  omeprazole (priLOSEC) 20 MG capsule, TK 1 C PO D, Disp: , Rfl: 3    REVIEW OF  "PERTINENT MEDICAL DATA  Chart reviewed and summarization of all medical records up to new patient visit performed.  [facility] pain notes reviewed.  No statement about reason why patient was discharged but upon chart review from PCP and [facility] stated she was discharged from being argumentative.  Per [facility]i's \"Letter mailed 11/2/18 explaining discharge from practice due to now shows and same day cancellations. Patient also admits to taking unprescribed medication. \"      IMAGING  LUMBAR SPINE 4 VIEWS PLUS FLEXION AND EXTENSION LATERAL VIEWS 08/22/2014   FINDINGS:  There is straightening of the lumbar lordosis. Intervertebral  disk metallic spacer is seen at the L5-S1 level. There is narrowing of  all lumbar disks. There also appears to be some ankylosis of the lumbar  vertebrae. No subluxation is seen on the neutral lateral view or on  flexion and extension lateral views though there is very limited flexion  and extension. No fractures are seen. Facet joints appear unremarkable.  There is some aortic calcification.   IMPRESSION-    1.  Extensive lumbar degenerative disease and ankylosis with L5-S1  intervertebral disk metallic spacers seen.  2.  No fractures are seen.     I personally reviewed the images of lumbar xray while patient was in the office.  Findings discussed with patient.      PFSH:  The following portions of the patient's history were reviewed and updated as appropriate: problem list, past medical history, past surgery history, social history, family history, medications, and allergies    Review of Systems   Constitutional: Positive for fatigue.   HENT: Positive for congestion.    Eyes: Negative for visual disturbance.   Respiratory: Positive for shortness of breath. Negative for cough and wheezing.    Cardiovascular: Positive for palpitations. Negative for chest pain and leg swelling.   Gastrointestinal: Positive for constipation. Negative for diarrhea.   Genitourinary: Positive for frequency. " Negative for difficulty urinating.   Musculoskeletal: Positive for back pain.   Skin: Negative for rash and wound.   Allergic/Immunologic: Negative for immunocompromised state.   Neurological: Positive for weakness and numbness (left arm).   Hematological: Does not bruise/bleed easily.   Psychiatric/Behavioral: Positive for sleep disturbance. Negative for suicidal ideas. The patient is nervous/anxious.    All other systems reviewed and are negative.      Vitals:    11/15/18 1048   BP: 160/72   Pulse: 64   Resp: 18   SpO2: 95%   Weight: 71.8 kg (158 lb 3.2 oz)   PainSc:   8   PainLoc: Back       Physical Exam   Constitutional: She appears well-developed and well-nourished. No distress.   HENT:   Head: Normocephalic and atraumatic.   Nose: Nose normal.   Mouth/Throat: Oropharynx is clear and moist.   Eyes: Conjunctivae and EOM are normal.   Neck: Normal range of motion. Neck supple.   Cardiovascular: Normal rate, regular rhythm and normal heart sounds.   Pulmonary/Chest: Effort normal and breath sounds normal. No stridor. No respiratory distress.   Abdominal: Soft. Bowel sounds are normal. She exhibits no distension. There is no tenderness.   Musculoskeletal:        Lumbar back: She exhibits decreased range of motion, tenderness and pain.   Neurological: She is alert. She has normal strength. No cranial nerve deficit or sensory deficit.   Skin: Skin is warm and dry. No rash noted. She is not diaphoretic.   Psychiatric: Her speech is normal. Her mood appears anxious. Her affect is angry. She is agitated, aggressive and hyperactive. She expresses impulsivity.   Nursing note and vitals reviewed.    Ortho Exam  Neurologic Exam     Mental Status   Speech: speech is normal     Cranial Nerves     CN III, IV, VI   Extraocular motions are normal.     Motor Exam     Strength   Strength 5/5 throughout.       Date of last FILOMENA reviewed : 11/16/18   Comments: Rolando Zheng was seen today for back pain.    Diagnoses  and all orders for this visit:    Encounter for long-term (current) use of high-risk medication  -     Ambulatory Referral to Psychology    Chronic neck pain  -     Urine Drug Screen Confirmation - Urine, Clean Catch; Future  -     POC Urine Drug Screen, Triage    Left arm pain    Degeneration of intervertebral disc of thoracolumbar region      Requested Prescriptions      No prescriptions requested or ordered in this encounter     - very difficult patient.  Patient was very argumentative and went around and rather multiple circles about her situation and trying to make me aware of what happened with her at her two previous pain clinics.  - I sat very patiently listen to her entire story about how neither one of them were her fault and all the issues she has been dealing with over the last several months.     - I have multiple issues with her current situation.  One is her fentanyl patch.  Which I frankly told her I do not prescribe.  I will not continue her fentanyl patch.  I am really confused why [facility] would continue to write her fentanyl patch every 48 hours when she states she was only wearing 1 every week??  She has not filled the fentanyl patches regularly over the last year -per Maninder- but states she was wearing them regularly for many years??  She currently has a fentanyl patch on today- written by her neck surgeon 3 weeks ago.  Her story and time frame doesn't add up when she talks about the use of her current fentanyl patch.  She states that she's on her last patch- but then she states again that she has 4 patches at home due to not wearing them that every 3-4 days.    - I am also confused why she states her she did not mention her new left upper extremity numbness to her neck surgeon who she saw 3 weeks ago.  I urged her to schedule a follow-up with him to discuss this new symptom.  - She also saw a nurse practitioner at [facility] neurosurgery 3 months ago who recommended her to get an updated MRI  and to come back to be evaluated by the surgeon but patient never returned.  Patient becomes very argumentative when asked why she didn't return states she didn't return due to being referred to the [facility] pain physician.  Tried to reassure her, the reason she was referred to a new pain physician was not because they didn't want you to follow up, it was because she was recently discharged from her pain clinic and requesting a new pain provider.  I urged her to follow up with [facility] neurosurgery now that she has new updated imaging.  Patient seems very confused and why she would follow up with the surgeon when Dr. Warren did her last low back surgery.  Once again we would run into circles and she becomes very argumentative.      - I tried to tell her multiple times I would not argue with her.   - I will only help her if: 1. She MUST complete an opioid risk assessment. ONLY after this is completed will I write for her a butrans patch ONLY. NO fentanyl patch will be given. Patch MUST be worse AS PRESCRIBED.   - she can continue to receive injections but I will wait until she sees psych and make sure patient is going to return as a patient.   - I encouraged her to see Dr. Jim as he has a pain physician currently working in his office and he prescribed her with a month's worth of that no patches several weeks ago.  Given he was very willing to give her a month's worth of fentanyl patch, if this is what she desires she can continue to follow up with him.   - This was a 60 minute face to face visit with over 35 minutes spent counseling on medication, usage and treatment plan.   I eventually had to cut the patient off and stand up and leave the room as she continued to argue with me about her situation.    Wt Readings from Last 3 Encounters:   11/15/18 71.8 kg (158 lb 3.2 oz)   10/10/17 77.1 kg (170 lb)   05/15/17 78.5 kg (173 lb)     Body mass index is 25.53 kg/m². Patient counseled on the importance of weight loss to  help with overall health and pain control. Patient instructed to attempt weight loss.   Plan: Calorie counting cut out extra servings and reduce fast food intake    Follow-up as needed for pain        Gricelda Fuller MD  Pain Management

## 2018-11-20 ENCOUNTER — RESULTS ENCOUNTER (OUTPATIENT)
Dept: PAIN MEDICINE | Facility: CLINIC | Age: 83
End: 2018-11-20

## 2018-11-20 DIAGNOSIS — G89.29 CHRONIC NECK PAIN: ICD-10-CM

## 2018-11-20 DIAGNOSIS — M54.2 CHRONIC NECK PAIN: ICD-10-CM

## 2019-01-03 ENCOUNTER — TELEPHONE (OUTPATIENT)
Dept: NEUROSURGERY | Facility: CLINIC | Age: 84
End: 2019-01-03

## 2019-07-09 NOTE — TELEPHONE ENCOUNTER
Patient states that it is burning when she urinates and is she is needing a refill on nitrourantoin sent to the [facility] pharmacy at [phone]. Please advise.

## 2019-07-10 RX ORDER — NITROFURANTOIN 25; 75 MG/1; MG/1
CAPSULE ORAL
OUTPATIENT
Start: 2019-07-10

## 2019-07-18 ENCOUNTER — TELEPHONE (OUTPATIENT)
Dept: FAMILY MEDICINE CLINIC | Facility: CLINIC | Age: 84
End: 2019-07-18

## 2019-07-18 RX ORDER — MELOXICAM 15 MG/1
15 TABLET ORAL DAILY
Qty: 30 TABLET | Refills: 1 | Status: SHIPPED | OUTPATIENT
Start: 2019-07-18 | End: 2019-07-22 | Stop reason: SDUPTHER

## 2019-07-18 RX ORDER — GABAPENTIN 400 MG/1
800 CAPSULE ORAL 3 TIMES DAILY
Qty: 180 CAPSULE | Refills: 0 | Status: SHIPPED | OUTPATIENT
Start: 2019-07-18 | End: 2019-07-22 | Stop reason: SDUPTHER

## 2019-07-22 ENCOUNTER — TELEPHONE (OUTPATIENT)
Dept: FAMILY MEDICINE CLINIC | Facility: CLINIC | Age: 84
End: 2019-07-22

## 2019-07-22 RX ORDER — MELOXICAM 15 MG/1
15 TABLET ORAL DAILY
Qty: 30 TABLET | Refills: 1 | Status: SHIPPED | OUTPATIENT
Start: 2019-07-22 | End: 2019-07-22 | Stop reason: SDUPTHER

## 2019-07-22 RX ORDER — GABAPENTIN 400 MG/1
800 CAPSULE ORAL 3 TIMES DAILY
Qty: 180 CAPSULE | Refills: 0 | Status: SHIPPED | OUTPATIENT
Start: 2019-07-22 | End: 2019-07-23 | Stop reason: SDUPTHER

## 2019-07-22 NOTE — TELEPHONE ENCOUNTER
These were filled on 7/18 but were still to [facility] instead of the [facility].  Spoke with [facility] and they had not been filled yet.  Advised that they cancel the order.    Can you please resend both the Gabapentin and the Meloxicam to the [facility] on [address]?

## 2019-07-22 NOTE — TELEPHONE ENCOUNTER
PATIENT STATES THAT [facility] BUT WAS DENIED BECAUSE SHE NEEDS AN APPT.    SHE HAS AN APPT. SCHEDULED FOR TOMORROW WITH YOU,   SHE HAS BEEN OUT OF THE GABAPENTIN FOR 3 DAYS AND STATES SHE IS NOT SUPPOSE TO TAKE IT.       SHE IS ALSO OUT MELOXICAM 15MG       CAN YOU PLEASE CALL THIS IN FOR HER.  SHE IS UPSET     THANK YOU

## 2019-07-22 NOTE — TELEPHONE ENCOUNTER
Ok the new scripts have been sent to [facility] and the [facility] mail order scripts cancelled.

## 2019-07-23 ENCOUNTER — OFFICE VISIT (OUTPATIENT)
Dept: FAMILY MEDICINE CLINIC | Facility: CLINIC | Age: 84
End: 2019-07-23

## 2019-07-23 VITALS
WEIGHT: 158 LBS | HEIGHT: 66 IN | OXYGEN SATURATION: 94 % | HEART RATE: 68 BPM | DIASTOLIC BLOOD PRESSURE: 72 MMHG | SYSTOLIC BLOOD PRESSURE: 124 MMHG | BODY MASS INDEX: 25.39 KG/M2 | TEMPERATURE: 98 F

## 2019-07-23 DIAGNOSIS — N30.00 ACUTE CYSTITIS WITHOUT HEMATURIA: ICD-10-CM

## 2019-07-23 DIAGNOSIS — I48.0 PAROXYSMAL ATRIAL FIBRILLATION (HCC): ICD-10-CM

## 2019-07-23 DIAGNOSIS — R35.0 URINARY FREQUENCY: Primary | ICD-10-CM

## 2019-07-23 DIAGNOSIS — I10 ESSENTIAL HYPERTENSION: ICD-10-CM

## 2019-07-23 DIAGNOSIS — I25.10 CORONARY ARTERIOSCLEROSIS IN NATIVE ARTERY: ICD-10-CM

## 2019-07-23 LAB
BILIRUB BLD-MCNC: NEGATIVE MG/DL
CLARITY, POC: ABNORMAL
COLOR UR: YELLOW
GLUCOSE UR STRIP-MCNC: NEGATIVE MG/DL
KETONES UR QL: NEGATIVE
LEUKOCYTE EST, POC: ABNORMAL
NITRITE UR-MCNC: POSITIVE MG/ML
PH UR: 7 [PH] (ref 5–8)
PROT UR STRIP-MCNC: NEGATIVE MG/DL
RBC # UR STRIP: NEGATIVE /UL
SP GR UR: 1.01 (ref 1–1.03)
UROBILINOGEN UR QL: NORMAL

## 2019-07-23 PROCEDURE — 81003 URINALYSIS AUTO W/O SCOPE: CPT | Performed by: INTERNAL MEDICINE

## 2019-07-23 PROCEDURE — 99214 OFFICE O/P EST MOD 30 MIN: CPT | Performed by: INTERNAL MEDICINE

## 2019-07-23 RX ORDER — SULFAMETHOXAZOLE AND TRIMETHOPRIM 800; 160 MG/1; MG/1
1 TABLET ORAL 2 TIMES DAILY
Qty: 20 TABLET | Refills: 0 | Status: SHIPPED | OUTPATIENT
Start: 2019-07-23 | End: 2019-09-09

## 2019-07-23 RX ORDER — MELOXICAM 15 MG/1
15 TABLET ORAL DAILY
Qty: 90 TABLET | Refills: 1 | Status: SHIPPED | OUTPATIENT
Start: 2019-07-23 | End: 2019-07-23 | Stop reason: SDUPTHER

## 2019-07-23 RX ORDER — MELOXICAM 15 MG/1
15 TABLET ORAL DAILY
Qty: 90 TABLET | Refills: 1 | Status: SHIPPED | OUTPATIENT
Start: 2019-07-23 | End: 2020-04-06 | Stop reason: SDUPTHER

## 2019-07-23 RX ORDER — GABAPENTIN 400 MG/1
800 CAPSULE ORAL 3 TIMES DAILY
Qty: 180 CAPSULE | Refills: 0 | Status: SHIPPED | OUTPATIENT
Start: 2019-07-23 | End: 2019-10-02 | Stop reason: SDUPTHER

## 2019-07-23 RX ORDER — LISINOPRIL 5 MG/1
5 TABLET ORAL DAILY
Qty: 90 TABLET | Refills: 1 | Status: SHIPPED | OUTPATIENT
Start: 2019-07-23 | End: 2019-10-02 | Stop reason: SDUPTHER

## 2019-07-23 RX ORDER — OMEPRAZOLE 20 MG/1
20 CAPSULE, DELAYED RELEASE ORAL DAILY
Qty: 90 CAPSULE | Refills: 3 | Status: SHIPPED | OUTPATIENT
Start: 2019-07-23 | End: 2019-10-02 | Stop reason: SDUPTHER

## 2019-07-23 NOTE — PROGRESS NOTES
Subjective   Marce Williamson is a 84 y.o. female.     Chief Complaint   Patient presents with   • Urinary Frequency       History of Present Illness     Complains of urinary frequency with burning on urination for the last week that is worsening and with urgency and some urinary incontinence.  No blood in the urine, abdomen pain, fever, chills, rash or itching.  Has a pressure feeling in the bladder.  Is busy at home with  that had recent stroke and hospitalization.   Follow-up for her hypertension.  Currently they have been feeling well and asymptomatic without any headaches,cough, chest pain, shortness of breath, swelling, focal neurologic deficit , memory loss or syncope.  Has been taking the medications regularly and adherent with the regimen, Denies medication side effects and no significant interval events.  Home blood pressure has not been checked.      Staff notes patient seems some anxious and when in bathroom.  Son Sergey drove patient to todays visit and is at home and helping.    The following portions of the patient's history were reviewed and updated as appropriate: allergies, current medications, past family history, past medical history, past social history, past surgical history and problem list.    Past Medical History:   Diagnosis Date   • Arthritis    • Difficulty swallowing    • Headache    • History of transfusion     AFTER HIATAL HERNIA SX   • Hypertension    • Irregular cardiac rhythm    • Polyp of sigmoid colon    • Throat mass     CYST ON RIGHT SIDE       Past Surgical History:   Procedure Laterality Date   • ANTERIOR CERVICAL DISCECTOMY W/ FUSION N/A 4/13/2016    Procedure: C-4-C-6 CERVICAL DISCECTOMY ANTERIOR FUSION WITH INSTRUMENTATION;  Surgeon: Blaine Jim DO;  Location: [facility];  Service:    • BACK SURGERY      Dr. Stringer 2001 and Dr. Warren 2002   • COLONOSCOPY     • HERNIA REPAIR     • HYSTERECTOMY     • KNEE SURGERY     • SHOULDER SURGERY         Family History    Problem Relation Age of Onset   • Heart failure Mother         CONGESTIVE   • Breast cancer Father    • Emphysema Father    • Hypertension Father    • Breast cancer Sister    • Other Sister         POLIO, 1963   • Diabetes Son    • Pancreatic cancer Son    • Cancer Son        Social History     Socioeconomic History   • Marital status:      Spouse name: Not on file   • Number of children: Not on file   • Years of education: Not on file   • Highest education level: Not on file   Tobacco Use   • Smoking status: Never Smoker   • Smokeless tobacco: Never Used   Substance and Sexual Activity   • Alcohol use: No   • Drug use: No   • Sexual activity: Defer       Review of Systems   Constitutional: Negative for activity change, appetite change, chills, fatigue, fever and unexpected weight loss.   HENT: Negative for congestion and facial swelling.    Eyes: Negative for visual disturbance.   Respiratory: Negative for cough, chest tightness and shortness of breath.    Cardiovascular: Negative for chest pain, palpitations and leg swelling.   Gastrointestinal: Positive for abdominal pain. Negative for diarrhea, nausea, vomiting and GERD.   Genitourinary: Positive for urinary incontinence, dysuria, frequency and urgency. Negative for flank pain, hematuria and vaginal discharge.   Musculoskeletal: Negative for arthralgias and back pain.   Hematological: Negative for adenopathy. Does not bruise/bleed easily.   Psychiatric/Behavioral: Negative for suicidal ideas and depressed mood. The patient is not nervous/anxious.        Objective   Vitals:    07/23/19 1112   BP: 124/72   Pulse: 68   Temp: 98 °F (36.7 °C)   SpO2: 94%     Body mass index is 25.5 kg/m².  Physical Exam   Constitutional: She is oriented to person, place, and time. She appears well-developed and well-nourished. No distress.   HENT:   Head: Normocephalic and atraumatic.   Right Ear: External ear normal.   Left Ear: External ear normal.   Nose: Nose normal.    Mouth/Throat: Oropharynx is clear and moist.   Eyes: Conjunctivae and EOM are normal. Pupils are equal, round, and reactive to light. No scleral icterus.   Neck: Normal range of motion. Neck supple. No thyromegaly present.   Cardiovascular: Normal rate, regular rhythm, normal heart sounds and intact distal pulses.   No murmur heard.  Pulmonary/Chest: Effort normal and breath sounds normal.   Abdominal: Soft. Bowel sounds are normal. There is tenderness. There is no rebound.   Suprapubic tenderness   Musculoskeletal: Normal range of motion. She exhibits no edema.   Neurological: She is alert and oriented to person, place, and time. She displays normal reflexes.   Skin: Skin is warm. No rash noted. She is not diaphoretic. No erythema.   Psychiatric: She has a normal mood and affect. Judgment normal.   Nursing note and vitals reviewed.      Brief Urine Lab Results  (Last result in the past 365 days)      Color   Clarity   Blood   Leuk Est   Nitrite   Protein   CREAT   Urine HCG        07/23/19 1144 Yellow Cloudy Negative Large (3+) Positive Negative             Assessment/Plan   Marce was seen today for urinary frequency.    Diagnoses and all orders for this visit:    Urinary frequency  -     POCT urinalysis dipstick, automated    Acute cystitis without hematuria    Coronary arteriosclerosis in native artery    Paroxysmal atrial fibrillation (CMS/HCC)    Essential hypertension      Discussed with patient that the UTI could be affecting memory and behavior.  Son (Sergey) transports and is in the home with patient and her .  Will treat the UTI.  Patient requests new cardiologist that is not Dr Herman.

## 2019-07-26 LAB
BACTERIA UR CULT: ABNORMAL
BACTERIA UR CULT: ABNORMAL
OTHER ANTIBIOTIC SUSC ISLT: ABNORMAL

## 2019-09-04 ENCOUNTER — TRANSCRIBE ORDERS (OUTPATIENT)
Dept: ADMINISTRATIVE | Facility: HOSPITAL | Age: 84
End: 2019-09-04

## 2019-09-04 DIAGNOSIS — M25.551 RIGHT HIP PAIN: Primary | ICD-10-CM

## 2019-09-05 PROBLEM — Z92.89 HISTORY OF ECHOCARDIOGRAM: Status: ACTIVE | Noted: 2019-02-22

## 2019-09-05 PROBLEM — Z92.89 HISTORY OF STRESS TEST: Status: ACTIVE | Noted: 2017-01-23

## 2019-09-09 ENCOUNTER — TELEPHONE (OUTPATIENT)
Dept: FAMILY MEDICINE CLINIC | Facility: CLINIC | Age: 84
End: 2019-09-09

## 2019-09-09 ENCOUNTER — OFFICE VISIT (OUTPATIENT)
Dept: RETAIL CLINIC | Facility: CLINIC | Age: 84
End: 2019-09-09

## 2019-09-09 VITALS
RESPIRATION RATE: 18 BRPM | HEART RATE: 80 BPM | TEMPERATURE: 98.2 F | DIASTOLIC BLOOD PRESSURE: 56 MMHG | SYSTOLIC BLOOD PRESSURE: 116 MMHG | OXYGEN SATURATION: 94 %

## 2019-09-09 DIAGNOSIS — N30.01 ACUTE CYSTITIS WITH HEMATURIA: Primary | ICD-10-CM

## 2019-09-09 LAB
BILIRUB BLD-MCNC: ABNORMAL MG/DL
CLARITY, POC: CLEAR
COLOR UR: YELLOW
GLUCOSE UR STRIP-MCNC: NEGATIVE MG/DL
KETONES UR QL: ABNORMAL
LEUKOCYTE EST, POC: ABNORMAL
NITRITE UR-MCNC: POSITIVE MG/ML
PH UR: 5.5 [PH] (ref 5–8)
PROT UR STRIP-MCNC: ABNORMAL MG/DL
RBC # UR STRIP: ABNORMAL /UL
SP GR UR: 1.03 (ref 1–1.03)
UROBILINOGEN UR QL: NORMAL

## 2019-09-09 PROCEDURE — 99213 OFFICE O/P EST LOW 20 MIN: CPT | Performed by: NURSE PRACTITIONER

## 2019-09-09 RX ORDER — NITROFURANTOIN 25; 75 MG/1; MG/1
100 CAPSULE ORAL 2 TIMES DAILY
Qty: 14 CAPSULE | Refills: 0 | Status: SHIPPED | OUTPATIENT
Start: 2019-09-09 | End: 2019-09-16

## 2019-09-09 NOTE — PROGRESS NOTES
Subjective   Marce Williamson is a 84 y.o. female.     Urinary Tract Infection    This is a recurrent problem. The current episode started 1 to 4 weeks ago (was treated about a month ago, was good for a few days then started back with symptoms). The problem occurs every urination. The problem has been gradually worsening. The quality of the pain is described as burning and aching. There has been no fever. Associated symptoms include flank pain, frequency, nausea, sweats and urgency. Pertinent negatives include no hematuria. Associated symptoms comments: Lower pelvic pain, fatigue. She has tried nothing for the symptoms. The treatment provided no relief. Her past medical history is significant for recurrent UTIs.        The following portions of the patient's history were reviewed and updated as appropriate: allergies, current medications, past family history, past medical history, past social history, past surgical history and problem list.    Review of Systems   HENT: Negative.    Respiratory: Negative.    Cardiovascular: Negative.    Gastrointestinal: Positive for nausea.   Genitourinary: Positive for flank pain, frequency and urgency. Negative for hematuria.   Skin: Negative.    Neurological: Negative.        Objective   Physical Exam   Constitutional: She is cooperative. No distress.   HENT:   Head: Normocephalic.   Right Ear: Hearing, tympanic membrane, external ear and ear canal normal.   Left Ear: Hearing, tympanic membrane, external ear and ear canal normal.   Nose: Nose normal.   Mouth/Throat: Oropharynx is clear and moist.   Eyes: Conjunctivae, EOM and lids are normal. Pupils are equal, round, and reactive to light.   Neck: Trachea normal and full passive range of motion without pain.   Cardiovascular: Normal rate, regular rhythm and normal pulses.   Pulmonary/Chest: Effort normal. She has wheezes in the left upper field. She has rales in the left lower field.   Abdominal: There is tenderness in the  suprapubic area. There is CVA tenderness (mild left side more than right).   Neurological: She is alert.   Skin: Skin is warm. Capillary refill takes less than 2 seconds.   Psychiatric: She has a normal mood and affect. Her speech is normal and behavior is normal.   Vitals reviewed.        Assessment/Plan   Marce was seen today for urinary tract infection.    Diagnoses and all orders for this visit:    Acute cystitis with hematuria  -     POC Urinalysis Dipstick, Automated  -     Urine Culture, Comprehen ([facility]) - Urine, Clean Catch    Other orders  -     nitrofurantoin, macrocrystal-monohydrate, (MACROBID) 100 MG capsule; Take 1 capsule by mouth 2 (Two) Times a Day for 7 days.

## 2019-09-09 NOTE — TELEPHONE ENCOUNTER
Pt ask the you return her call. She is having bladder problems, with extreme/severe  burning and pain. 20 days after finishing sulfamethoxazole-trimethoprim (BACTRIM DS,SEPTRA DS) 800-160 MG she started having problem again.  Please return her call. She was instructed to go to express care or urgent care.

## 2019-09-09 NOTE — PATIENT INSTRUCTIONS
Urinary Tract Infection, Adult  A urinary tract infection (UTI) is an infection of any part of the urinary tract. The urinary tract includes the:  · Kidneys.  · Ureters.  · Bladder.  · Urethra.  These organs make, store, and get rid of pee (urine) in the body.  Follow these instructions at home:    · Take over-the-counter and prescription medicines only as told by your doctor.  · If you were prescribed an antibiotic medicine, take it as told by your doctor. Do not stop taking it even if you start to feel better.  · Drink enough fluid to keep your pee (urine) pale yellow. For most people, this is 6-10 glasses of water each day.  · Keep all follow-up visits as told by your doctor. This is important.  · Make sure you:  ? Empty your bladder often and completely. Do not hold pee for long periods of time.  ? Empty your bladder after sex.  ? Wipe from front to back after a bowel movement if you are female. Use each tissue one time when you wipe.  Contact a doctor if:  · Your symptoms do not get better after 1-2 days.  · Your symptoms go away and then come back.  Get help right away if:  · You have very bad pain in your back.  · You have very bad pain in your lower belly (abdomen).  · You have a fever.  · You are sick to your stomach (nauseous).  · You are throwing up (vomiting).  Summary  · A urinary tract infection (UTI) is an infection of any part of the urinary tract.  · If you were prescribed an antibiotic medicine, take it as told by your doctor. Do not stop taking it even if you start to feel better.  · Drink enough fluid to keep your pee (urine) pale yellow.  This information is not intended to replace advice given to you by your health care provider. Make sure you discuss any questions you have with your health care provider.  Document Released: 06/05/2009 Document Revised: 02/12/2019 Document Reviewed: 11/07/2016  [employer] Interactive Patient Education © 2019 [employer]

## 2019-09-10 ENCOUNTER — HOSPITAL ENCOUNTER (OUTPATIENT)
Dept: GENERAL RADIOLOGY | Facility: HOSPITAL | Age: 84
Discharge: HOME | End: 2019-09-10
Admitting: ORTHOPAEDIC SURGERY

## 2019-09-10 DIAGNOSIS — M25.551 RIGHT HIP PAIN: ICD-10-CM

## 2019-09-10 PROCEDURE — 25010000002 IOPAMIDOL 61 % SOLUTION: Performed by: RADIOLOGY

## 2019-09-10 PROCEDURE — 77002 NEEDLE LOCALIZATION BY XRAY: CPT

## 2019-09-10 PROCEDURE — 25010000002 METHYLPREDNISOLONE PER 40 MG: Performed by: RADIOLOGY

## 2019-09-10 PROCEDURE — 25010000003 LIDOCAINE 1 % SOLUTION: Performed by: RADIOLOGY

## 2019-09-10 RX ORDER — BUPIVACAINE HYDROCHLORIDE 2.5 MG/ML
10 INJECTION, SOLUTION EPIDURAL; INFILTRATION; INTRACAUDAL ONCE
Status: COMPLETED | OUTPATIENT
Start: 2019-09-10 | End: 2019-09-10

## 2019-09-10 RX ORDER — LIDOCAINE HYDROCHLORIDE 10 MG/ML
10 INJECTION, SOLUTION INFILTRATION; PERINEURAL ONCE
Status: COMPLETED | OUTPATIENT
Start: 2019-09-10 | End: 2019-09-10

## 2019-09-10 RX ORDER — METHYLPREDNISOLONE ACETATE 40 MG/ML
40 INJECTION, SUSPENSION INTRA-ARTICULAR; INTRALESIONAL; INTRAMUSCULAR; SOFT TISSUE ONCE
Status: COMPLETED | OUTPATIENT
Start: 2019-09-10 | End: 2019-09-10

## 2019-09-10 RX ADMIN — METHYLPREDNISOLONE ACETATE 80 MG: 40 INJECTION, SUSPENSION INTRA-ARTICULAR; INTRALESIONAL; INTRAMUSCULAR; SOFT TISSUE at 12:10

## 2019-09-10 RX ADMIN — IOPAMIDOL 1.5 ML: 612 INJECTION, SOLUTION INTRAVENOUS at 12:10

## 2019-09-10 RX ADMIN — LIDOCAINE HYDROCHLORIDE 3 ML: 10 INJECTION, SOLUTION INFILTRATION; PERINEURAL at 12:17

## 2019-09-10 RX ADMIN — BUPIVACAINE HYDROCHLORIDE 5 ML: 2.5 INJECTION, SOLUTION EPIDURAL; INFILTRATION; INTRACAUDAL; PERINEURAL at 12:10

## 2019-09-13 LAB
BACTERIA UR CULT: ABNORMAL
BACTERIA UR CULT: ABNORMAL
OTHER ANTIBIOTIC SUSC ISLT: ABNORMAL

## 2019-09-20 ENCOUNTER — OFFICE VISIT (OUTPATIENT)
Dept: CARDIOLOGY | Facility: CLINIC | Age: 84
End: 2019-09-20

## 2019-09-20 VITALS
SYSTOLIC BLOOD PRESSURE: 128 MMHG | HEIGHT: 66 IN | WEIGHT: 161.4 LBS | RESPIRATION RATE: 18 BRPM | DIASTOLIC BLOOD PRESSURE: 68 MMHG | BODY MASS INDEX: 25.94 KG/M2 | HEART RATE: 77 BPM

## 2019-09-20 DIAGNOSIS — I48.0 PAROXYSMAL ATRIAL FIBRILLATION (HCC): ICD-10-CM

## 2019-09-20 DIAGNOSIS — I10 ESSENTIAL HYPERTENSION: ICD-10-CM

## 2019-09-20 DIAGNOSIS — I25.10 CORONARY ARTERIOSCLEROSIS IN NATIVE ARTERY: ICD-10-CM

## 2019-09-20 DIAGNOSIS — I49.3 VENTRICULAR PREMATURE BEATS: ICD-10-CM

## 2019-09-20 DIAGNOSIS — I25.10 CHRONIC CORONARY ARTERY DISEASE: ICD-10-CM

## 2019-09-20 DIAGNOSIS — R00.2 PALPITATIONS: Primary | ICD-10-CM

## 2019-09-20 DIAGNOSIS — I73.9 CLAUDICATION (HCC): ICD-10-CM

## 2019-09-20 PROCEDURE — 99204 OFFICE O/P NEW MOD 45 MIN: CPT | Performed by: INTERNAL MEDICINE

## 2019-09-20 PROCEDURE — 93000 ELECTROCARDIOGRAM COMPLETE: CPT | Performed by: INTERNAL MEDICINE

## 2019-09-20 RX ORDER — PROPAFENONE HYDROCHLORIDE 150 MG/1
150 TABLET, COATED ORAL EVERY 12 HOURS
Refills: 3 | COMMUNITY
Start: 2019-09-02 | End: 2019-11-01 | Stop reason: SDUPTHER

## 2019-09-23 ENCOUNTER — TELEPHONE (OUTPATIENT)
Dept: CARDIOLOGY | Facility: CLINIC | Age: 84
End: 2019-09-23

## 2019-09-23 NOTE — TELEPHONE ENCOUNTER
Pt called stating she forgot to mention that she was allergic tape adhesive and had to remove the zio.    PT CB# [phone]

## 2019-09-24 NOTE — TELEPHONE ENCOUNTER
Spoke with patient she is sending monitor back in mail today. Alos ready to have testing performed on her legs because they are numb she states. Will mention to Larisa to see if we can get that scheduled asap. KH

## 2019-09-25 ENCOUNTER — HOSPITAL ENCOUNTER (OUTPATIENT)
Dept: CARDIOLOGY | Facility: HOSPITAL | Age: 84
Discharge: HOME | End: 2019-09-25
Admitting: INTERNAL MEDICINE

## 2019-09-25 DIAGNOSIS — I73.9 CLAUDICATION (HCC): ICD-10-CM

## 2019-09-25 LAB
BH CV LOWER ARTERIAL LEFT ABI RATIO: 1.57
BH CV LOWER ARTERIAL LEFT GREAT TOE SYS MAX: 70 MMHG
BH CV LOWER ARTERIAL LEFT TBI RATIO: 0.54
BH CV LOWER ARTERIAL RIGHT GREAT TOE SYS MAX: 68 MMHG
BH CV LOWER ARTERIAL RIGHT TBI RATIO: 0.53
UPPER ARTERIAL LEFT ARM BRACHIAL SYS MAX: 129 MMHG
UPPER ARTERIAL RIGHT ARM BRACHIAL SYS MAX: 123 MMHG

## 2019-09-25 PROCEDURE — 93922 UPR/L XTREMITY ART 2 LEVELS: CPT

## 2019-10-02 ENCOUNTER — OFFICE VISIT (OUTPATIENT)
Dept: FAMILY MEDICINE CLINIC | Facility: CLINIC | Age: 84
End: 2019-10-02

## 2019-10-02 VITALS
HEART RATE: 58 BPM | SYSTOLIC BLOOD PRESSURE: 122 MMHG | BODY MASS INDEX: 24.91 KG/M2 | HEIGHT: 66 IN | OXYGEN SATURATION: 97 % | TEMPERATURE: 97.9 F | WEIGHT: 155 LBS | DIASTOLIC BLOOD PRESSURE: 68 MMHG

## 2019-10-02 DIAGNOSIS — R07.89 LEFT-SIDED CHEST WALL PAIN: ICD-10-CM

## 2019-10-02 DIAGNOSIS — Z12.31 SCREENING MAMMOGRAM, ENCOUNTER FOR: ICD-10-CM

## 2019-10-02 DIAGNOSIS — R20.2 PARESTHESIA OF LOWER EXTREMITY: Primary | ICD-10-CM

## 2019-10-02 DIAGNOSIS — N64.4 PAIN OF LEFT BREAST: ICD-10-CM

## 2019-10-02 PROBLEM — G62.9 NEUROPATHY: Status: ACTIVE | Noted: 2019-10-02

## 2019-10-02 PROCEDURE — 99214 OFFICE O/P EST MOD 30 MIN: CPT | Performed by: INTERNAL MEDICINE

## 2019-10-02 PROCEDURE — 71046 X-RAY EXAM CHEST 2 VIEWS: CPT | Performed by: INTERNAL MEDICINE

## 2019-10-02 RX ORDER — GABAPENTIN 400 MG/1
800 CAPSULE ORAL 3 TIMES DAILY
Qty: 180 CAPSULE | Refills: 1 | Status: SHIPPED | OUTPATIENT
Start: 2019-10-02 | End: 2020-02-10

## 2019-10-02 RX ORDER — LISINOPRIL 5 MG/1
5 TABLET ORAL DAILY
Qty: 90 TABLET | Refills: 1 | Status: SHIPPED | OUTPATIENT
Start: 2019-10-02 | End: 2020-06-04 | Stop reason: SDUPTHER

## 2019-10-02 RX ORDER — OMEPRAZOLE 20 MG/1
20 CAPSULE, DELAYED RELEASE ORAL DAILY
Qty: 90 CAPSULE | Refills: 3 | Status: SHIPPED | OUTPATIENT
Start: 2019-10-02 | End: 2020-06-04 | Stop reason: SDUPTHER

## 2019-10-02 NOTE — PROGRESS NOTES
Subjective   Marce Williamson is a 84 y.o. female.     Chief Complaint   Patient presents with   • Leg Pain     going numb   • Breast Pain     left       History of Present Illness   Patient has been doing well other than having to care for  after his stroke with some behavior issues and grabbing at the patients arm.  He is being evaluated currently.  States both legs and feet are burning and chronic foot pain that has been chronic and not changing.  Has seen cardiology and had ABIs that were inconclusive with incompressability but mild decreased digital pressure.  Complains of pain in the left breast and in the left side of chest.  Pain on the left side of the chest and tender to push on the areas.    The following portions of the patient's history were reviewed and updated as appropriate: allergies, current medications, past family history, past medical history, past social history, past surgical history and problem list.    Past Medical History:   Diagnosis Date   • Arthritis    • Difficulty swallowing    • Headache    • History of echocardiogram 02/22/2019    EF 63% There is fibrocalcific sclerosis of the aortic valve without evidence of aortic stenosis. Mild TR. Trace MR.    • History of stress test 01/23/2017    Probably normal Lexiscan cardiolite stress. No evidence of significant pharmacologic induced ischemia. No previos infarctions zones. Normal LV systolic function.    • History of transfusion     AFTER HIATAL HERNIA SX   • Hypertension    • Irregular cardiac rhythm    • Polyp of sigmoid colon    • Throat mass     CYST ON RIGHT SIDE       Past Surgical History:   Procedure Laterality Date   • ANTERIOR CERVICAL DISCECTOMY W/ FUSION N/A 4/13/2016    Procedure: C-4-C-6 CERVICAL DISCECTOMY ANTERIOR FUSION WITH INSTRUMENTATION;  Surgeon: Blaine Jim DO;  Location: [facility];  Service:    • BACK SURGERY      Dr. Stringer 2001 and Dr. Warren 2002   • COLONOSCOPY     • HERNIA REPAIR     •  HYSTERECTOMY     • KNEE SURGERY     • SHOULDER SURGERY         Family History   Problem Relation Age of Onset   • Heart failure Mother         CONGESTIVE   • Breast cancer Father    • Emphysema Father    • Hypertension Father    • Breast cancer Sister    • Other Sister         POLIO, 1963   • Diabetes Son    • Pancreatic cancer Son    • Cancer Son        Social History     Socioeconomic History   • Marital status:      Spouse name: Not on file   • Number of children: Not on file   • Years of education: Not on file   • Highest education level: Not on file   Tobacco Use   • Smoking status: Never Smoker   • Smokeless tobacco: Never Used   Substance and Sexual Activity   • Alcohol use: No   • Drug use: No   • Sexual activity: Defer       Review of Systems   Constitutional: Negative for activity change, appetite change, fatigue, fever, unexpected weight gain and unexpected weight loss.   HENT: Negative for nosebleeds, rhinorrhea, trouble swallowing and voice change.    Eyes: Negative for visual disturbance.   Respiratory: Negative for cough, chest tightness, shortness of breath and wheezing.    Cardiovascular: Negative for chest pain, palpitations and leg swelling.   Gastrointestinal: Negative for abdominal pain, blood in stool, constipation, diarrhea, nausea, vomiting, GERD and indigestion.   Genitourinary: Negative for dysuria, frequency and hematuria.   Musculoskeletal: Negative for arthralgias, back pain and myalgias.        Left side chest wall pain and left breast pain.  Pain in the legs and feet.   Skin: Negative for rash and bruise.   Neurological: Negative for dizziness, tremors, weakness, light-headedness, numbness, headache and memory problem.   Hematological: Negative for adenopathy. Does not bruise/bleed easily.   Psychiatric/Behavioral: Negative for sleep disturbance and depressed mood. The patient is not nervous/anxious.        Objective   Vitals:    10/02/19 1445   BP: 122/68   Pulse: 58   Temp:  97.9 °F (36.6 °C)   SpO2: 97%     Body mass index is 25.02 kg/m².  Physical Exam   Constitutional: She is oriented to person, place, and time. She appears well-developed and well-nourished. No distress.   HENT:   Head: Normocephalic and atraumatic.   Right Ear: External ear normal.   Left Ear: External ear normal.   Nose: Nose normal.   Mouth/Throat: Oropharynx is clear and moist.   Eyes: Conjunctivae and EOM are normal. Pupils are equal, round, and reactive to light.   Neck: Normal range of motion. Neck supple. No tracheal deviation present. No thyromegaly present.   Cardiovascular: Normal rate, regular rhythm, normal heart sounds and intact distal pulses. Exam reveals no gallop and no friction rub.   No murmur heard.  Pulmonary/Chest: Effort normal and breath sounds normal. No respiratory distress.   Abdominal: Soft. Bowel sounds are normal. She exhibits no mass. There is no tenderness. There is no guarding.   Musculoskeletal: Normal range of motion. She exhibits no edema.   Tender left side of chest wall with bruise in the area and tender left breast.   Lymphadenopathy:     She has no cervical adenopathy.   Neurological: She is alert and oriented to person, place, and time. She displays normal reflexes. She exhibits normal muscle tone.   Skin: Skin is warm and dry. Capillary refill takes less than 2 seconds. No rash noted. She is not diaphoretic.   Psychiatric: She has a normal mood and affect. Her behavior is normal. Judgment and thought content normal.   Nursing note and vitals reviewed.      Recent Results (from the past 2016 hour(s))   POCT urinalysis dipstick, automated    Collection Time: 07/23/19 11:44 AM   Result Value Ref Range    Color Yellow Yellow, Straw, Dark Yellow, Nilam    Clarity, UA Cloudy (A) Clear    Specific Gravity  1.015 1.005 - 1.030    pH, Urine 7.0 5.0 - 8.0    Leukocytes Large (3+) (A) Negative    Nitrite, UA Positive (A) Negative    Protein, POC Negative Negative mg/dL    Glucose, UA  Negative Negative, 1000 mg/dL (3+) mg/dL    Ketones, UA Negative Negative    Urobilinogen, UA Normal Normal    Bilirubin Negative Negative    Blood, UA Negative Negative   Urine Culture - Urine, Urine, Clean Catch    Collection Time: 07/23/19  4:00 PM   Result Value Ref Range    Urine Culture Final report (A)     Result 1 Escherichia coli (A)     Susceptibility Testing Comment    POC Urinalysis Dipstick, Automated    Collection Time: 09/09/19  6:16 PM   Result Value Ref Range    Color Yellow Yellow, Straw, Dark Yellow, Nilam    Clarity, UA Clear Clear    Specific Gravity  1.030 1.005 - 1.030    pH, Urine 5.5 5.0 - 8.0    Leukocytes Small (1+) (A) Negative    Nitrite, UA Positive (A) Negative    Protein, POC 30 mg/dL (A) Negative mg/dL    Glucose, UA Negative Negative, 1000 mg/dL (3+) mg/dL    Ketones, UA Trace (A) Negative    Urobilinogen, UA Normal Normal    Bilirubin Small (1+) (A) Negative    Blood, UA Small (A) Negative   Urine Culture, Comprehen ([facility]) - Urine, Clean Catch    Collection Time: 09/09/19  6:59 PM   Result Value Ref Range    Urine Culture Final report (A)     Result 1 Escherichia coli (A)     Susceptibility Testing Comment    Doppler Ankle Brachial Index Single Level CAR    Collection Time: 09/25/19  3:06 PM   Result Value Ref Range    RIGHT GREAT TOE SYS MAX 68 mmHg    RIGHT TBI RATIO 0.53     LEFT GREAT TOE SYS MAX 70 mmHg    LEFT ANKIT RATIO 1.57     LEFT TBI RATIO 0.54     Upper arterial right arm brachial sys max 123 mmHg    Upper arterial left arm brachial sys max 129 mmHg       Chest x-ray- no evidence of pneumonia masses or pneumothorax.  No rib fracture is noted.      Assessment/Plan   Marce was seen today for leg pain and breast pain.    Diagnoses and all orders for this visit:    Paresthesia of lower extremity  -     gabapentin (NEURONTIN) 400 MG capsule; Take 2 capsules by mouth 3 (Three) Times a Day.    Left-sided chest wall pain  -     XR Chest 2 View    Screening mammogram,  encounter for  -     Mammo Screening Bilateral With CAD    Pain of left breast  -     Mammo Screening Bilateral With CAD    Other orders  -     lisinopril (PRINIVIL,ZESTRIL) 5 MG tablet; Take 1 tablet by mouth Daily. take 1 tablet by mouth once daily  -     omeprazole (priLOSEC) 20 MG capsule; Take 1 capsule by mouth Daily.    Discussed at length with patient concerning her pain on the left side of her chest and breast.  The left chest wall pain is likely secondary to trauma with evidence of a bruise overlying the area that is tender no evidence of fractures were noted.  Intermatic treatment only at this time.  Mammogram ordered for evaluation.  Her leg pain is chronic and really is unchanged from her multiple past visits.  No new medications or changes are needed at this time.  Recommend that she continue to have her  evaluated as noted in his chart

## 2019-10-07 ENCOUNTER — TELEPHONE (OUTPATIENT)
Dept: FAMILY MEDICINE CLINIC | Facility: CLINIC | Age: 84
End: 2019-10-07

## 2019-10-07 DIAGNOSIS — Z12.31 SCREENING MAMMOGRAM, ENCOUNTER FOR: Primary | ICD-10-CM

## 2019-10-07 DIAGNOSIS — N64.89 OTHER SPECIFIED DISORDERS OF BREAST: ICD-10-CM

## 2019-10-07 NOTE — TELEPHONE ENCOUNTER
Nia called to schedule her Mammogram and she mentioned to them that she has cyst in her left breast.  She said they wouldn't schedule it then because the order was only for a regular mammogram and they told her she needed a diagnostic mammogram so the order needs to be changed.  She wants to know if you can change the order so they will call back and schedule it please.

## 2019-10-09 NOTE — PROGRESS NOTES
Miki Breaux MD, PhD  Cardiology clinic note initial encounter  [facility]  [facility]  Subjective:     Encounter Date:09/20/2019      Patient ID: Marce Williamson is a 84 y.o. female.    Chief Complaint:  Chief Complaint   Patient presents with   • Establish Care       HPI:  History of Present Illness  I had the pleasure of seeing this very pleasant 84-year-old who is a patient of Dr. Miki Khalil and primary care.  Her blood pressure today is 128/68 heart rate 70s weight 161 pounds she is 5 foot 6 respiratory rate 18-20 and no distress.  She presents for chief complaint to establish care in the setting of prior known coronary artery disease as well as paroxysmal atrial fibrillation, hypertension, history of [place] Subu or stress-induced cardiomyopathy she had an echo in February 2019 demonstrating normal LV systolic structure and function with normal LV EF at 63%, mild concentric LVH, mild diastolic dysfunction, normal left atrial size, normal RV size and structure and function, sclerosis of the aortic valve without hemodynamic stenosis, no aortic regurgitation, trace to mild mitral regurgitation with no stenosis, RVSP estimated at 21 mmHg which is normal and mild TR, no effusions no masses no other significant valvulopathy.  She also had a stress test last dated January 2017 which was pharmacologic secondary to inability to walk.  It was negative for Lexiscan induced chest pain, negative ECG evidence of ischemia at submaximal heart rate, normal LVEF, no ischemia seen or prior infarct zones.  She has a history of pericarditis again with talk at [place] syndrome and PVCs is effectively treated with a sputum all all and she is on per path and on for rhythm control of her atrial fibrillation which remains normal sinus rhythm and she is also on anticoagulation with Xarelto for stroke risk reduction with chads BASC score greater than 2.  Her chief complaint today  is occasional palpitations as well as she has bilateral lower extremity pain with walking significantly.  With history of vascular disease it is prudent and she is agreeable to get ABIs and arterial Dopplers with segmental pressures.  We will also order a ZIO Patch today for evaluation of palpitations with history of paroxysmal atrial fibrillation as well as PVCs.  She denies syncope chest pain or dyspnea on exertion presently she is compliant with all medications.  She has no other complaints today.  She had recent E. coli UTI    The following portions of the patient's history were reviewed and updated as appropriate: allergies, current medications, past family history, past medical history, past social history, past surgical history and problem list.    Problem List:  Patient Active Problem List   Diagnosis   • Coronary arteriosclerosis in native artery   • Chronic coronary artery disease   • Chest pain   • Palpitations   • Hypertension   • Ventricular premature beats   • Shortness of breath   • Disc disorder of cervical region   • Cervical radiculopathy   • Low back pain   • Pain of lower extremity   • Chronic neck pain   • Pain in thoracic spine   • Cough   • Degeneration of intervertebral disc of lumbar region   • Degeneration of intervertebral disc of thoracic region   • Degeneration of intervertebral disc of thoracolumbar region   • Dizziness   • Dysphagia   • Gastritis   • Gastroesophageal reflux disease without esophagitis   • Goiter   • Left arm pain   • Paroxysmal atrial fibrillation (CMS/HCC)   • Degenerative cervical spinal stenosis   • Atrial fibrillation (CMS/HCC)   • Epigastric pain   • Abdominal pain   • Other spondylosis with radiculopathy, cervical region   • Chronic constipation   • Non-specific colitis   • Other symptoms and signs involving the musculoskeletal system   • Diverticulosis of intestine   • Elevated troponin I level   • Flatback syndrome   • Impingement syndrome of shoulder region    • Irritable bowel syndrome   • Left lower quadrant pain   • Nausea   • Paresthesia of lower extremity   • Osteoarthritis of knee   • Pulmonary venous congestion   • Rotator cuff tear arthropathy   • Acquired deformity of spine   • Screening mammogram, encounter for   • History of echocardiogram   • History of stress test   • Neuropathy   • Left-sided chest wall pain   • Pain of left breast       Past Medical History:  Past Medical History:   Diagnosis Date   • Arthritis    • Difficulty swallowing    • Headache    • History of echocardiogram 02/22/2019    EF 63% There is fibrocalcific sclerosis of the aortic valve without evidence of aortic stenosis. Mild TR. Trace MR.    • History of stress test 01/23/2017    Probably normal Lexiscan cardiolite stress. No evidence of significant pharmacologic induced ischemia. No previos infarctions zones. Normal LV systolic function.    • History of transfusion     AFTER HIATAL HERNIA SX   • Hypertension    • Irregular cardiac rhythm    • Polyp of sigmoid colon    • Throat mass     CYST ON RIGHT SIDE       Past Surgical History:  Past Surgical History:   Procedure Laterality Date   • ANTERIOR CERVICAL DISCECTOMY W/ FUSION N/A 4/13/2016    Procedure: C-4-C-6 CERVICAL DISCECTOMY ANTERIOR FUSION WITH INSTRUMENTATION;  Surgeon: Blaine Jim DO;  Location: [facility];  Service:    • BACK SURGERY      Dr. Stringer 2001 and Dr. Warren 2002   • COLONOSCOPY     • HERNIA REPAIR     • HYSTERECTOMY     • KNEE SURGERY     • SHOULDER SURGERY         Social History:  Social History     Socioeconomic History   • Marital status:      Spouse name: Not on file   • Number of children: Not on file   • Years of education: Not on file   • Highest education level: Not on file   Tobacco Use   • Smoking status: Never Smoker   • Smokeless tobacco: Never Used   Substance and Sexual Activity   • Alcohol use: No   • Drug use: No   • Sexual activity: Defer       Allergies:  Allergies   Allergen  "Reactions   • Keflex  [Cephalexin] Palpitations and Nausea And Vomiting     Amoxicillin is ok   • Metoclopramide Palpitations   • Pentazocine      Patient does not know this medication       Immunizations:  Immunization History   Administered Date(s) Administered   • Hepatitis A 04/26/2018, 07/23/2018   • Influenza, Unspecified 09/01/2015, 10/03/2016, 09/15/2017, 09/15/2018   • Pneumococcal Conjugate 13-Valent (PCV13) 11/09/2016   • Pneumococcal Polysaccharide (PPSV23) 09/01/2015   • Zostavax 01/01/2012       ROS:  ROS       Objective:         /68 (BP Location: Left arm, Patient Position: Sitting)   Pulse 77   Resp 18   Ht 167.6 cm (66\")   Wt 73.2 kg (161 lb 6.4 oz)   BMI 26.05 kg/m²     Physical Exam  No acute distress alert and oriented x3 afebrile vital signs stable  Normocephalic atraumatic pupils equal round extract move intact bilaterally  Regular rate and rhythm no rubs or gallops 1 out of 6 systolic ejection murmur left sternal border no heave no lift  Lungs clear to auscultation bilaterally  Abdomen soft nontender nondistended bowel sounds positive  No clubbing cyanosis, trace nonpitting edema  Peripheral pulses diminished lower extremities 1+ radials bilaterally and equal  No gross neurologic deficits  Normal mood and affect  Skin warm and dry no ecchymoses or petechiae  No carotid bruits or JVD present  No bony abnormalities grossly    In-Office Procedure(s):    ECG 12 Lead  Date/Time: 10/9/2019 9:38 AM  Performed by: Miki Breaux MD  Authorized by: Miki Breaux MD   Comparison: not compared with previous ECG   Previous ECG: no previous ECG available  Rhythm: sinus rhythm  Rate: normal  Conduction: non-specific intraventricular conduction delay  ST Segments: ST segments normal  QRS axis: normal  Other findings: non-specific ST-T wave changes and left atrial abnormality    Clinical impression: abnormal EKG            ASCVD RIsk Score::  The ASCVD Risk score (Cassius SYED Jr., " et al., 2013) failed to calculate for the following reasons:    The 2013 ASCVD risk score is only valid for ages 40 to 79    Recent Radiology:  Imaging Results (most recent)     None          Lab Review:   Office Visit on 09/09/2019   Component Date Value   • Color 09/09/2019 Yellow    • Clarity, UA 09/09/2019 Clear    • Specific Gravity  09/09/2019 1.030    • pH, Urine 09/09/2019 5.5    • Leukocytes 09/09/2019 Small (1+)*   • Nitrite, UA 09/09/2019 Positive*   • Protein, POC 09/09/2019 30 mg/dL*   • Glucose, UA 09/09/2019 Negative    • Ketones, UA 09/09/2019 Trace*   • Urobilinogen, UA 09/09/2019 Normal    • Bilirubin 09/09/2019 Small (1+)*   • Blood, UA 09/09/2019 Small*   • Urine Culture 09/09/2019 Final report*   • Result 1 09/09/2019 Escherichia coli*   • Susceptibility Testing 09/09/2019 Comment    Office Visit on 07/23/2019   Component Date Value   • Color 07/23/2019 Yellow    • Clarity, UA 07/23/2019 Cloudy*   • Specific Gravity  07/23/2019 1.015    • pH, Urine 07/23/2019 7.0    • Leukocytes 07/23/2019 Large (3+)*   • Nitrite, UA 07/23/2019 Positive*   • Protein, POC 07/23/2019 Negative    • Glucose, UA 07/23/2019 Negative    • Ketones, UA 07/23/2019 Negative    • Urobilinogen, UA 07/23/2019 Normal    • Bilirubin 07/23/2019 Negative    • Blood, UA 07/23/2019 Negative    • Urine Culture 07/23/2019 Final report*   • Result 1 07/23/2019 Escherichia coli*   • Susceptibility Testing 07/23/2019 Comment                 Assessment:          Diagnosis Plan   1. Palpitations  ECG 12 Lead    Holter Monitor - 72 Hour Up To 21 Days   2. Claudication (CMS/HCC)  Doppler Ankle Brachial Index Single Level CAR   3. Coronary arteriosclerosis in native artery     4. Chronic coronary artery disease     5. Essential hypertension     6. Ventricular premature beats     7. Paroxysmal atrial fibrillation (CMS/HCC)            Plan:      #1 palpitations new problem  ZIO Patch for 2 weeks with patient triggered events    Claudication  and bilateral lower extremity pain with walking  ABIs and arterial Dopplers with segmental pressures    History of coronary artery disease, chest pain-free appears stable  Last ischemic evaluation Lexiscan 2017 without ischemia  2D echo normal LV structure and function with no wall motion abnormality February 2019  She is not currently on goal-directed medical therapy, will evaluate for daily aspirin as well as statin with fasting lipid panel prior to next visit and she has no angina presently and blood pressure is controlled with normal LV structure and function she does not meet criteria essentially for beta-blocker.    Paroxysmal atrial fibrillation  Continue pro-path unknown  ZIO Patch as above  Continue Xarelto for stroke risk reduction    PVCs frequently  On acebutolol  On per path unknown  ZIO Patch as above    Return to clinic in 3 months  Is a pleasure to be involved in her cardiac care  Please call with any questions concerns.    Sincerely,  Miki Breaux MD, PhD             Miki Breaux MD, PhD  10/09/19  .

## 2019-10-11 ENCOUNTER — TELEPHONE (OUTPATIENT)
Dept: FAMILY MEDICINE CLINIC | Facility: CLINIC | Age: 84
End: 2019-10-11

## 2019-10-11 NOTE — TELEPHONE ENCOUNTER
PT CAME INTO OFFICE AND ASKED FOR REFILL ON MEDICATION     HYDROCODONE- 5-325 MG    [id number] - [facility] [address] AT [facility] - [phone] [facility] [phone] FX

## 2019-10-15 NOTE — TELEPHONE ENCOUNTER
That is because I have not been prescribing the pain medicine to her.  She had been getting the medication from her pain management doctor Dr. Burks previously.  She probably should follow back up with him.

## 2019-10-17 DIAGNOSIS — G89.29 CHRONIC BILATERAL LOW BACK PAIN, UNSPECIFIED WHETHER SCIATICA PRESENT: Primary | ICD-10-CM

## 2019-10-17 DIAGNOSIS — M47.22 OTHER SPONDYLOSIS WITH RADICULOPATHY, CERVICAL REGION: ICD-10-CM

## 2019-10-17 DIAGNOSIS — M54.50 CHRONIC BILATERAL LOW BACK PAIN, UNSPECIFIED WHETHER SCIATICA PRESENT: Primary | ICD-10-CM

## 2019-10-17 NOTE — TELEPHONE ENCOUNTER
Per Dr. Khalil he will not fill the hydrocodone but, he will send a referral to pain management.  Belle advise patient that Dr. Voss will send a referral

## 2019-10-29 ENCOUNTER — APPOINTMENT (OUTPATIENT)
Dept: MAMMOGRAPHY | Facility: HOSPITAL | Age: 84
End: 2019-10-29

## 2019-11-01 ENCOUNTER — OFFICE VISIT (OUTPATIENT)
Dept: CARDIOLOGY | Facility: CLINIC | Age: 84
End: 2019-11-01

## 2019-11-01 VITALS
SYSTOLIC BLOOD PRESSURE: 150 MMHG | DIASTOLIC BLOOD PRESSURE: 72 MMHG | HEIGHT: 66 IN | HEART RATE: 70 BPM | BODY MASS INDEX: 25.07 KG/M2 | RESPIRATION RATE: 18 BRPM | WEIGHT: 156 LBS

## 2019-11-01 DIAGNOSIS — I25.10 CHRONIC CORONARY ARTERY DISEASE: ICD-10-CM

## 2019-11-01 DIAGNOSIS — R07.89 CHEST PAIN, ATYPICAL: Primary | ICD-10-CM

## 2019-11-01 DIAGNOSIS — I49.3 VENTRICULAR PREMATURE BEATS: ICD-10-CM

## 2019-11-01 DIAGNOSIS — J18.9 PNEUMONIA DUE TO INFECTIOUS ORGANISM, UNSPECIFIED LATERALITY, UNSPECIFIED PART OF LUNG: ICD-10-CM

## 2019-11-01 DIAGNOSIS — I10 ESSENTIAL HYPERTENSION: ICD-10-CM

## 2019-11-01 DIAGNOSIS — R00.2 PALPITATIONS: ICD-10-CM

## 2019-11-01 DIAGNOSIS — I25.10 CORONARY ARTERIOSCLEROSIS IN NATIVE ARTERY: ICD-10-CM

## 2019-11-01 DIAGNOSIS — I48.0 PAROXYSMAL ATRIAL FIBRILLATION (HCC): ICD-10-CM

## 2019-11-01 DIAGNOSIS — G62.9 NEUROPATHY: ICD-10-CM

## 2019-11-01 PROCEDURE — 99214 OFFICE O/P EST MOD 30 MIN: CPT | Performed by: INTERNAL MEDICINE

## 2019-11-01 RX ORDER — PROPAFENONE HYDROCHLORIDE 150 MG/1
150 TABLET, COATED ORAL EVERY 12 HOURS
Qty: 60 TABLET | Refills: 5 | Status: SHIPPED | OUTPATIENT
Start: 2019-11-01 | End: 2020-11-20 | Stop reason: SDUPTHER

## 2019-11-01 RX ORDER — AZITHROMYCIN 250 MG/1
TABLET, FILM COATED ORAL
Qty: 6 TABLET | Refills: 0 | Status: SHIPPED | OUTPATIENT
Start: 2019-11-01 | End: 2019-11-05

## 2019-11-01 NOTE — PROGRESS NOTES
Miki Breaux MD, PhD  [facility], [place] cardiology  [facility]   Cardiology clinic note    Subjective:     Encounter Date:11/01/2019      Patient ID: Marce Williamson is a 84 y.o. female.    Chief Complaint:  Chief Complaint   Patient presents with   • Chest Pain       HPI:  History of Present Illness  Per prior encounter  I had the pleasure of seeing this very pleasant 84-year-old who is a patient of Dr. Miki Khalil and primary care.  Her blood pressure today is 128/68 heart rate 70s weight 161 pounds she is 5 foot 6 respiratory rate 18-20 and no distress.  She presents for chief complaint to establish care in the setting of prior known coronary artery disease as well as paroxysmal atrial fibrillation, hypertension, history of [place] or stress-induced cardiomyopathy she had an echo in February 2019 demonstrating normal LV systolic structure and function with normal LV EF at 63%, mild concentric LVH, mild diastolic dysfunction, normal left atrial size, normal RV size and structure and function, sclerosis of the aortic valve without hemodynamic stenosis, no aortic regurgitation, trace to mild mitral regurgitation with no stenosis, RVSP estimated at 21 mmHg which is normal and mild TR, no effusions no masses no other significant valvulopathy.  She also had a stress test last dated January 2017 which was pharmacologic secondary to inability to walk.  It was negative for Lexiscan induced chest pain, negative ECG evidence of ischemia at submaximal heart rate, normal LVEF, no ischemia seen or prior infarct zones.  She has a history of pericarditis again with talk at [place] syndrome and PVCs is effectively treated with a sputum all all and she is on per path and on for rhythm control of her atrial fibrillation which remains normal sinus rhythm and she is also on anticoagulation with Xarelto for stroke risk reduction with chads BASC score greater than 2.  Her chief complaint today is occasional  palpitations as well as she has bilateral lower extremity pain with walking significantly.  With history of vascular disease it is prudent and she is agreeable to get ABIs and arterial Dopplers with segmental pressures.  We will also order a ZIO Patch today for evaluation of palpitations with history of paroxysmal atrial fibrillation as well as PVCs.  She denies syncope chest pain or dyspnea on exertion presently she is compliant with all medications.  She has no other complaints today.  She had recent E. coli UTI    Today she presents with continued pains of bilateral lower extremity which is more typical for neuropathy with tingling and burning at rest especially when she lays down.  She has palpable dorsalis pedis pulses.  ABIs were unable to be performed with vessel and compressibility but there was dopplerable waveforms with mild digital ischemia present in the toes.  She has no tissue loss or wounds.  Her cap refill is normal in all digits.  She also complains about palpitations and fleeting seconds of chest pain which are atypical in nature.  Again she had a ischemic evaluation as above 2017 with normal Lexiscan at that time she had a recent echo as above 2019 demonstrating normal LV structure and function EF 63%.  She has no heart failure signs or symptoms but does complain of some shortness of breath.  Physical exam did reveal rhonchi in the left middle and left lower lobes unilaterally and she has had a cough without fevers she says.  She is a non-smoker.  She follows with Dr. Miki Khalil MD and we called clinic to try to get her a clinic appointment noting this abnormal finding after informing her we would get a chest x-ray today as well which she is amenable for.  She is allergic to Keflex with severe GI upset and Levaquin is not optimal given she is on per path unknown.  Therefore a azithromycin was chosen in order to try to get her back to primary care versus pulmonology for follow-up which she is  amenable for..  She asked us to refill her pro-path unknown today.  Previous EKG revealed normal sinus rhythm with no QT prolongation or QRS prolongation.  She is not currently taking daily aspirin which is likely warranted with her risk factors and she is amenable to take aspirin enteric-coated 3 and 25 mrem p.o. daily.  In summary she presents today for continued palpitations and she was unable to tolerate ZIO Patch secondary to severe adhesion allergy and remove the ZIO Patch at 18 hours which revealed only one run of short SVT.  She has been off her pro-path known intermittently and she says palpitations are worse off it.  She has restarted this not had significant symptoms.  No syncope reported.  She complains of shortness of breath without overt edema and says her legs are small as they have ever been.  She also complains of atypical chest pain and need of restarting her antiarrhythmic therapy.  She does not follow with electro physiology at this time.  She is amenable to seeing a letter physiology in our group Dr. Panfilo Locke.  No other complaints today      The following portions of the patient's history were reviewed and updated as appropriate: allergies, current medications, past family history, past medical history, past social history, past surgical history and problem list.    Problem List:  Patient Active Problem List   Diagnosis   • Coronary arteriosclerosis in native artery   • Chronic coronary artery disease   • Chest pain   • Palpitations   • Hypertension   • Ventricular premature beats   • Shortness of breath   • Disc disorder of cervical region   • Cervical radiculopathy   • Low back pain   • Pain of lower extremity   • Chronic neck pain   • Pain in thoracic spine   • Cough   • Degeneration of intervertebral disc of lumbar region   • Degeneration of intervertebral disc of thoracic region   • Degeneration of intervertebral disc of thoracolumbar region   • Dizziness   • Dysphagia   • Gastritis   •  Gastroesophageal reflux disease without esophagitis   • Goiter   • Left arm pain   • Paroxysmal atrial fibrillation (CMS/HCC)   • Degenerative cervical spinal stenosis   • Atrial fibrillation (CMS/HCC)   • Epigastric pain   • Abdominal pain   • Other spondylosis with radiculopathy, cervical region   • Chronic constipation   • Non-specific colitis   • Other symptoms and signs involving the musculoskeletal system   • Diverticulosis of intestine   • Elevated troponin I level   • Flatback syndrome   • Impingement syndrome of shoulder region   • Irritable bowel syndrome   • Left lower quadrant pain   • Nausea   • Paresthesia of lower extremity   • Osteoarthritis of knee   • Pulmonary venous congestion   • Rotator cuff tear arthropathy   • Acquired deformity of spine   • Screening mammogram, encounter for   • History of echocardiogram   • History of stress test   • Neuropathy   • Left-sided chest wall pain   • Pain of left breast       Past Medical History:  Past Medical History:   Diagnosis Date   • Abdominal cramping    • Abdominal pain, left lateral    • Abnormal CT of the abdomen    • Allergic rhinitis    • Anxiety    • Arthralgia of multiple sites    • Arthritis    • Atrial fibrillation (CMS/HCC)    • Back pain    • BMI 25.0-25.9,adult    • Breast lump    • Calf pain    • Cervical spondylosis    • Constipation    • DDD (degenerative disc disease), lumbar    • Difficulty swallowing    • Diverticulitis of colon    • Dysphagia    • Dysuria    • Easy bruising    • Edema    • Elevated d-dimer    • Episodic tension-type headache, not intractable    • External hemorrhoids    • Fatigue    • Full thickness burn of trunk    • Generalized osteoarthritis of multiple sites    • Headache    • Heart murmur    • Heme positive stool    • Hiatal hernia    • High risk medication use    • History of colonoscopy    • History of echocardiogram 02/22/2019    EF 63% There is fibrocalcific sclerosis of the aortic valve without evidence of  aortic stenosis. Mild TR. Trace MR.    • History of leukocytosis    • History of pneumonia     Left lower lobe   • History of rectal bleeding     History of blood per rectum-Abstracted from [facility]   • History of stress test 01/23/2017    Probably normal Lexiscan cardiolite stress. No evidence of significant pharmacologic induced ischemia. No previos infarctions zones. Normal LV systolic function.    • History of transfusion     AFTER HIATAL HERNIA SX   • Hypertension    • Irregular cardiac rhythm    • Irritable bowel syndrome    • Left flank pain    • Left kidney mass    • Left lower quadrant pain    • Localized osteoarthritis of left shoulder    • Low back pain    • Lumbar foraminal stenosis    • Middle ear effusion    • Nausea     Nausea alone-Abstracted from [facility]   • Neural foraminal stenosis of cervical spine    • Nontoxic single thyroid nodule    • Osteopenia    • Other screening mammogram    • Pain in both lower extremities    • Pain in joint of right hip    • Pain in right buttock    • Pain of both hip joints    • Pain of upper extremity    • Polyp of sigmoid colon    • Rectal pain    • Renal cyst, left    • Sciatic pain, right    • Short of breath on exertion    • Shortness of breath    • Shoulder pain    • Skin lesion    • Swelling of lower extremity    • Throat mass     CYST ON RIGHT SIDE   • Tinnitus, right    • Urinary frequency    • Urinary tract infection    • Vitamin B12 deficiency    • Vitamin C deficiency    • Vulvovaginitis    • Weight loss        Past Surgical History:  Past Surgical History:   Procedure Laterality Date   • ANTERIOR CERVICAL DISCECTOMY W/ FUSION N/A 4/13/2016    Procedure: C-4-C-6 CERVICAL DISCECTOMY ANTERIOR FUSION WITH INSTRUMENTATION;  Surgeon: Blaine Jim DO;  Location: [facility];  Service:    • BACK SURGERY      Dr. Stringer 2001 and Dr. Warren 2002   • CHOLECYSTECTOMY     • COLONOSCOPY      4/6/17 Normal, 3/2013 with diverticuli Dr. Dacosta   • HERNIA REPAIR     •  "HYSTERECTOMY     • LUMBAR FUSION      L1-L3 fusion   • REPLACEMENT TOTAL KNEE Right    • SHOULDER SURGERY         Social History:  Social History     Socioeconomic History   • Marital status:      Spouse name: Not on file   • Number of children: Not on file   • Years of education: Not on file   • Highest education level: Not on file   Tobacco Use   • Smoking status: Never Smoker   • Smokeless tobacco: Never Used   Substance and Sexual Activity   • Alcohol use: No   • Drug use: No   • Sexual activity: Defer       Allergies:  Allergies   Allergen Reactions   • Keflex  [Cephalexin] Palpitations and Nausea And Vomiting     Amoxicillin is ok   • Metoclopramide Palpitations   • Pentazocine      Patient does not know this medication       Immunizations:  Immunization History   Administered Date(s) Administered   • Hepatitis A 04/26/2018, 07/23/2018   • Influenza, Unspecified 09/01/2015, 10/03/2016, 09/15/2017, 09/15/2018   • Pneumococcal Conjugate 13-Valent (PCV13) 11/09/2016   • Pneumococcal Polysaccharide (PPSV23) 09/01/2015   • Zostavax 01/01/2012       ROS:  ROS  Negative x14 point review of system except was mentioned above today.     Objective:         /72 (BP Location: Left arm, Patient Position: Sitting)   Pulse 70   Resp 18   Ht 167.6 cm (66\")   Wt 70.8 kg (156 lb)   BMI 25.18 kg/m²     Physical Exam  No acute distress alert and oriented x3 afebrile vital signs stable  Normocephalic atraumatic pupils equal round extract move intact bilaterally  Regular rate and rhythm no rubs or gallops 1 out of 6 systolic ejection murmur left sternal border no heave no lift  Lungs clear on the right, rhonchi and evidence of atelectasis in the left lower and middle lobes.  Did not resolve with deep inspiration, pleurisy on deep inspiration as well  Abdomen soft nontender nondistended bowel sounds positive  No clubbing cyanosis, trace nonpitting edema stable  Peripheral pulses diminished lower extremities 1+ " radials bilaterally and equal dorsalis pedis 1+, no lower extremity wounds or tissue loss whatsoever.  Good cap refill less than 2 seconds  No gross neurologic deficits  Normal mood and affect  Skin warm and dry no ecchymoses or petechiae  No carotid bruits or JVD present  No bony abnormalities grossly  In-Office Procedure(s):  Procedures    ASCVD RIsk Score::  The ASCVD Risk score (Cassius SYED Jr., et al., 2013) failed to calculate for the following reasons:    The 2013 ASCVD risk score is only valid for ages 40 to 79    Recent Radiology:  Imaging Results (Most Recent)     None          Lab Review:   Hospital Outpatient Visit on 09/25/2019   Component Date Value   • RIGHT GREAT TOE SYS MAX 09/25/2019 68    • RIGHT TBI RATIO 09/25/2019 0.53    • LEFT GREAT TOE SYS MAX 09/25/2019 70    • LEFT ANKIT RATIO 09/25/2019 1.57    • LEFT TBI RATIO 09/25/2019 0.54    • Upper arterial right arm* 09/25/2019 123    • Upper arterial left arm * 09/25/2019 129    Office Visit on 09/09/2019   Component Date Value   • Color 09/09/2019 Yellow    • Clarity, UA 09/09/2019 Clear    • Specific Gravity  09/09/2019 1.030    • pH, Urine 09/09/2019 5.5    • Leukocytes 09/09/2019 Small (1+)*   • Nitrite, UA 09/09/2019 Positive*   • Protein, POC 09/09/2019 30 mg/dL*   • Glucose, UA 09/09/2019 Negative    • Ketones, UA 09/09/2019 Trace*   • Urobilinogen, UA 09/09/2019 Normal    • Bilirubin 09/09/2019 Small (1+)*   • Blood, UA 09/09/2019 Small*   • Urine Culture 09/09/2019 Final report*   • Result 1 09/09/2019 Escherichia coli*   • Susceptibility Testing 09/09/2019 Comment    Office Visit on 07/23/2019   Component Date Value   • Color 07/23/2019 Yellow    • Clarity, UA 07/23/2019 Cloudy*   • Specific Gravity  07/23/2019 1.015    • pH, Urine 07/23/2019 7.0    • Leukocytes 07/23/2019 Large (3+)*   • Nitrite, UA 07/23/2019 Positive*   • Protein, POC 07/23/2019 Negative    • Glucose, UA 07/23/2019 Negative    • Ketones, UA 07/23/2019 Negative    •  Urobilinogen, UA 07/23/2019 Normal    • Bilirubin 07/23/2019 Negative    • Blood, UA 07/23/2019 Negative    • Urine Culture 07/23/2019 Final report*   • Result 1 07/23/2019 Escherichia coli*   • Susceptibility Testing 07/23/2019 Comment                 Assessment:          Diagnosis Plan   1. Chest pain, atypical  Adult Transthoracic Echo Complete W/ Cont if Necessary Per Protocol    Stress Test With Myocardial Perfusion One Day   2. Palpitations  Cardiac Event Monitor   3. Pneumonia due to infectious organism, unspecified laterality, unspecified part of lung  XR Chest PA & Lateral    azithromycin (ZITHROMAX) 250 MG tablet   4. Neuropathy  Ambulatory Referral to Neurology   5. Chronic coronary artery disease  aspirin (PX ENTERIC ASPIRIN) 325 MG EC tablet   6. Paroxysmal atrial fibrillation (CMS/HCC)  propafenone (RYTHMOL) 150 MG tablet   7. Coronary arteriosclerosis in native artery     8. Ventricular premature beats     9. Essential hypertension            Plan:        #1 palpitations new problem  30-day event monitor if she will tolerate.  Evidence of SVT isolated short runs on ZIO Patch with limited time in place.  Refer to electrophysiology will assess per path and on therapy as well     Claudication and bilateral lower extremity pain with walking  ABIs and arterial Dopplers with segmental pressures unable to be performed with incompressible vessels but evidence of palpable pulses and dopplerable flows.  No tissue loss.  Lower extremity pain more typical of neuropathy with tingling and burning    Lower extremity pain  Refer to neurology for work-up of spinal issues and neuropathy with paresthesias upper and lower extremities and numbness     History of coronary artery disease, chest pain-free appears stable  Last ischemic evaluation Lexiscan 2017 without ischemia, repeat Lexiscan  2D echo normal LV structure and function with no wall motion abnormality February 2019  Start aspirin 325 enteric-coated daily,    Fasting lipid panel to evaluate for statin need      Paroxysmal atrial fibrillation  Continue propafenone, refer to electrophysiology for monitoring and to assess if this is optimal agent for the patient for paroxysmal atrial fibrillation, not currently on anticoagulation, daily aspirin for some degree of stroke risk reduction.  ZIO Patch as above  Previously on Xarelto, not currently on Xarelto, will reassess for candidacy or why this was discontinued     PVCs frequently  On acebutolol, none on limited ZIO Patch  On propafenone.  EP to see  ZIO Patch as above, she will tolerate this     Return to clinic in 3 months  Is a pleasure to be involved in her cardiac care  Please call with any questions concerns.      In summary  Refill antiarrhythmic  Refer to elective physiology  Refer to neurology for neuropathy  2D echo with Lexiscan stress  30-day event monitor for palpitations  Chest x-ray for shortness of breath with physical exam exam findings suggesting possible pneumonia  Azithromycin 500 mg p.o. x1 followed by 250 mg p.o. daily and refer back to primary care versus pulmonology    See back to clinic in 3 months, she will see EP within the next 2 to 4 weeks.     Sincerely,  Miki Breaux MD, PhD    Level of Care:                 Miki Breaux MD  11/01/19  .

## 2019-11-04 ENCOUNTER — HOSPITAL ENCOUNTER (OUTPATIENT)
Dept: GENERAL RADIOLOGY | Facility: HOSPITAL | Age: 84
Discharge: HOME | End: 2019-11-04
Admitting: INTERNAL MEDICINE

## 2019-11-04 ENCOUNTER — HOSPITAL ENCOUNTER (OUTPATIENT)
Dept: CARDIOLOGY | Facility: HOSPITAL | Age: 84
End: 2019-11-04

## 2019-11-04 DIAGNOSIS — J18.9 PNEUMONIA DUE TO INFECTIOUS ORGANISM, UNSPECIFIED LATERALITY, UNSPECIFIED PART OF LUNG: ICD-10-CM

## 2019-11-04 PROCEDURE — 71046 X-RAY EXAM CHEST 2 VIEWS: CPT

## 2019-11-05 ENCOUNTER — OFFICE VISIT (OUTPATIENT)
Dept: FAMILY MEDICINE CLINIC | Facility: CLINIC | Age: 84
End: 2019-11-05

## 2019-11-05 ENCOUNTER — TELEPHONE (OUTPATIENT)
Dept: FAMILY MEDICINE CLINIC | Facility: CLINIC | Age: 84
End: 2019-11-05

## 2019-11-05 VITALS
HEIGHT: 66 IN | SYSTOLIC BLOOD PRESSURE: 122 MMHG | DIASTOLIC BLOOD PRESSURE: 64 MMHG | BODY MASS INDEX: 25.43 KG/M2 | HEART RATE: 69 BPM | WEIGHT: 158.2 LBS | OXYGEN SATURATION: 96 %

## 2019-11-05 DIAGNOSIS — K59.09 CHRONIC CONSTIPATION: ICD-10-CM

## 2019-11-05 DIAGNOSIS — R73.03 PREDIABETES: ICD-10-CM

## 2019-11-05 DIAGNOSIS — N30.00 ACUTE CYSTITIS WITHOUT HEMATURIA: ICD-10-CM

## 2019-11-05 DIAGNOSIS — R73.09 ELEVATED HEMOGLOBIN A1C MEASUREMENT: ICD-10-CM

## 2019-11-05 DIAGNOSIS — R35.0 FREQUENCY OF URINATION: Primary | ICD-10-CM

## 2019-11-05 DIAGNOSIS — I10 ESSENTIAL HYPERTENSION: ICD-10-CM

## 2019-11-05 PROBLEM — N39.0 URINARY TRACT INFECTION: Status: ACTIVE | Noted: 2019-11-05

## 2019-11-05 LAB
BILIRUB BLD-MCNC: NEGATIVE MG/DL
CLARITY, POC: CLEAR
COLOR UR: YELLOW
GLUCOSE UR STRIP-MCNC: NEGATIVE MG/DL
KETONES UR QL: NEGATIVE
LEUKOCYTE EST, POC: ABNORMAL
NITRITE UR-MCNC: POSITIVE MG/ML
PH UR: 6.5 [PH] (ref 5–8)
PROT UR STRIP-MCNC: ABNORMAL MG/DL
RBC # UR STRIP: ABNORMAL /UL
SP GR UR: 1.01 (ref 1–1.03)
UROBILINOGEN UR QL: NORMAL

## 2019-11-05 PROCEDURE — 99214 OFFICE O/P EST MOD 30 MIN: CPT | Performed by: INTERNAL MEDICINE

## 2019-11-05 PROCEDURE — 81003 URINALYSIS AUTO W/O SCOPE: CPT | Performed by: INTERNAL MEDICINE

## 2019-11-05 RX ORDER — ASPIRIN 81 MG/1
81 TABLET ORAL DAILY
Status: ON HOLD | COMMUNITY
End: 2021-12-31 | Stop reason: SDUPTHER

## 2019-11-05 RX ORDER — SULFAMETHOXAZOLE AND TRIMETHOPRIM 800; 160 MG/1; MG/1
1 TABLET ORAL 2 TIMES DAILY
Qty: 20 TABLET | Refills: 0 | Status: SHIPPED | OUTPATIENT
Start: 2019-11-05 | End: 2019-11-13

## 2019-11-05 NOTE — PROGRESS NOTES
Subjective   Marce Williamson is a 84 y.o. female.     Chief Complaint   Patient presents with   • Urinary Frequency   • Hypertension   • Pain     back and legs       History of Present Illness   Urinary frequency for the last week and burning on urination.  Follow-up for hypertension.  Currently has been feeling well and asymptomatic without any headaches,vision changes, cough, chest pain, shortness of breath, swelling, focal neurologic deficit, memory loss or syncope.  Has been taking the medications regularly and adherent with the regimen, Denies medication side effects and no significant interval events.   Saw cardiology who was going to do event monitor but did not tolerate the adhesive.  She does not want at this time.  Continues to have chronic tailbone pain and anus pain with low back pain.  Present for years.  Has a follow up visit for pain management with Dr Patel for second opinion.      The following portions of the patient's history were reviewed and updated as appropriate: allergies, current medications, past family history, past medical history, past social history, past surgical history and problem list.    Past Medical History:   Diagnosis Date   • Abdominal cramping    • Abdominal pain, left lateral    • Abnormal CT of the abdomen    • Allergic rhinitis    • Anxiety    • Arthralgia of multiple sites    • Arthritis    • Atrial fibrillation (CMS/HCC)    • Back pain    • BMI 25.0-25.9,adult    • Breast lump    • Calf pain    • Cervical spondylosis    • Constipation    • DDD (degenerative disc disease), lumbar    • Difficulty swallowing    • Diverticulitis of colon    • Dysphagia    • Dysuria    • Easy bruising    • Edema    • Elevated d-dimer    • Episodic tension-type headache, not intractable    • External hemorrhoids    • Fatigue    • Full thickness burn of trunk    • Generalized osteoarthritis of multiple sites    • Headache    • Heart murmur    • Heme positive stool    • Hiatal hernia    •  High risk medication use    • History of colonoscopy    • History of echocardiogram 02/22/2019    EF 63% There is fibrocalcific sclerosis of the aortic valve without evidence of aortic stenosis. Mild TR. Trace MR.    • History of leukocytosis    • History of pneumonia     Left lower lobe   • History of rectal bleeding     History of blood per rectum-Abstracted from [facility]   • History of stress test 01/23/2017    Probably normal Lexiscan cardiolite stress. No evidence of significant pharmacologic induced ischemia. No previos infarctions zones. Normal LV systolic function.    • History of transfusion     AFTER HIATAL HERNIA SX   • Hypertension    • Irregular cardiac rhythm    • Irritable bowel syndrome    • Left flank pain    • Left kidney mass    • Left lower quadrant pain    • Localized osteoarthritis of left shoulder    • Low back pain    • Lumbar foraminal stenosis    • Middle ear effusion    • Nausea     Nausea alone-Abstracted from [facility]   • Neural foraminal stenosis of cervical spine    • Nontoxic single thyroid nodule    • Osteopenia    • Other screening mammogram    • Pain in both lower extremities    • Pain in joint of right hip    • Pain in right buttock    • Pain of both hip joints    • Pain of upper extremity    • Polyp of sigmoid colon    • Rectal pain    • Renal cyst, left    • Sciatic pain, right    • Short of breath on exertion    • Shortness of breath    • Shoulder pain    • Skin lesion    • Swelling of lower extremity    • Throat mass     CYST ON RIGHT SIDE   • Tinnitus, right    • Urinary frequency    • Urinary tract infection    • Vitamin B12 deficiency    • Vitamin C deficiency    • Vulvovaginitis    • Weight loss        Past Surgical History:   Procedure Laterality Date   • ANTERIOR CERVICAL DISCECTOMY W/ FUSION N/A 4/13/2016    Procedure: C-4-C-6 CERVICAL DISCECTOMY ANTERIOR FUSION WITH INSTRUMENTATION;  Surgeon: Blaine Jim DO;  Location: [facility];  Service:    • BACK SURGERY       Dr. Stringer 2001 and Dr. Warren 2002   • CHOLECYSTECTOMY     • COLONOSCOPY      4/6/17 Normal, 3/2013 with diverticuli Dr. Dacosta   • HERNIA REPAIR     • HYSTERECTOMY     • LUMBAR FUSION      L1-L3 fusion   • REPLACEMENT TOTAL KNEE Right    • SHOULDER SURGERY         Family History   Problem Relation Age of Onset   • Heart failure Mother         CONGESTIVE   • Breast cancer Father    • Emphysema Father    • Hypertension Father    • Breast cancer Sister    • Other Sister         POLIO, 1963   • Diabetes Son    • Pancreatic cancer Son    • No Known Problems Other        Social History     Socioeconomic History   • Marital status:      Spouse name: Not on file   • Number of children: Not on file   • Years of education: Not on file   • Highest education level: Not on file   Tobacco Use   • Smoking status: Never Smoker   • Smokeless tobacco: Never Used   Substance and Sexual Activity   • Alcohol use: No   • Drug use: No   • Sexual activity: Defer       Review of Systems   Constitutional: Negative for activity change, appetite change, fatigue, fever, unexpected weight gain and unexpected weight loss.   HENT: Negative for nosebleeds, rhinorrhea, trouble swallowing and voice change.    Eyes: Negative for visual disturbance.   Respiratory: Negative for cough, chest tightness, shortness of breath and wheezing.    Cardiovascular: Negative for chest pain, palpitations and leg swelling.   Gastrointestinal: Negative for abdominal pain, blood in stool, constipation, diarrhea, nausea, vomiting, GERD and indigestion.   Genitourinary: Positive for dysuria and frequency. Negative for hematuria.   Musculoskeletal: Positive for arthralgias and back pain. Negative for myalgias.   Skin: Negative for rash and bruise.   Neurological: Negative for dizziness, tremors, weakness, light-headedness, numbness, headache and memory problem.   Hematological: Negative for adenopathy. Does not bruise/bleed easily.   Psychiatric/Behavioral:  Negative for sleep disturbance and depressed mood. The patient is not nervous/anxious.        Objective   Vitals:    11/05/19 1105   BP: 122/64   Pulse: 69   SpO2: 96%     Body mass index is 25.55 kg/m².  Physical Exam   Constitutional: She is oriented to person, place, and time. She appears well-developed and well-nourished. No distress.   HENT:   Head: Normocephalic and atraumatic.   Right Ear: External ear normal.   Left Ear: External ear normal.   Nose: Nose normal.   Mouth/Throat: Oropharynx is clear and moist.   Eyes: Conjunctivae and EOM are normal. Pupils are equal, round, and reactive to light.   Neck: Normal range of motion. Neck supple. No tracheal deviation present. No thyromegaly present.   Cardiovascular: Normal rate, regular rhythm, normal heart sounds and intact distal pulses. Exam reveals no gallop and no friction rub.   No murmur heard.  Pulmonary/Chest: Effort normal and breath sounds normal. No respiratory distress.   Left basilar crackles posteriorly.   Abdominal: Soft. Bowel sounds are normal. She exhibits no mass. There is no tenderness. There is no guarding.   Musculoskeletal: Normal range of motion. She exhibits no edema.   Lymphadenopathy:     She has no cervical adenopathy.   Neurological: She is alert and oriented to person, place, and time. She displays normal reflexes. She exhibits normal muscle tone.   Skin: Skin is warm and dry. Capillary refill takes less than 2 seconds. No rash noted. She is not diaphoretic.   Psychiatric: She has a normal mood and affect. Her behavior is normal. Judgment and thought content normal.   Nursing note and vitals reviewed.      Recent Results (from the past 2016 hour(s))   POC Urinalysis Dipstick, Automated    Collection Time: 09/09/19  6:16 PM   Result Value Ref Range    Color Yellow Yellow, Straw, Dark Yellow, Nilam    Clarity, UA Clear Clear    Specific Gravity  1.030 1.005 - 1.030    pH, Urine 5.5 5.0 - 8.0    Leukocytes Small (1+) (A) Negative     Nitrite, UA Positive (A) Negative    Protein, POC 30 mg/dL (A) Negative mg/dL    Glucose, UA Negative Negative, 1000 mg/dL (3+) mg/dL    Ketones, UA Trace (A) Negative    Urobilinogen, UA Normal Normal    Bilirubin Small (1+) (A) Negative    Blood, UA Small (A) Negative   Urine Culture, Comprehen ([facility]) - Urine, Clean Catch    Collection Time: 09/09/19  6:59 PM   Result Value Ref Range    Urine Culture Final report (A)     Result 1 Escherichia coli (A)     Susceptibility Testing Comment    Doppler Ankle Brachial Index Single Level CAR    Collection Time: 09/25/19  3:06 PM   Result Value Ref Range    RIGHT GREAT TOE SYS MAX 68 mmHg    RIGHT TBI RATIO 0.53     LEFT GREAT TOE SYS MAX 70 mmHg    LEFT ANKIT RATIO 1.57     LEFT TBI RATIO 0.54     Upper arterial right arm brachial sys max 123 mmHg    Upper arterial left arm brachial sys max 129 mmHg   POCT urinalysis dipstick, automated    Collection Time: 11/05/19 11:48 AM   Result Value Ref Range    Color Yellow Yellow, Straw, Dark Yellow, Nilam    Clarity, UA Clear Clear    Specific Gravity  1.015 1.005 - 1.030    pH, Urine 6.5 5.0 - 8.0    Leukocytes Large (3+) (A) Negative    Nitrite, UA Positive (A) Negative    Protein, POC Trace (A) Negative mg/dL    Glucose, UA Negative Negative, 1000 mg/dL (3+) mg/dL    Ketones, UA Negative Negative    Urobilinogen, UA Normal Normal    Bilirubin Negative Negative    Blood, UA 1+ (A) Negative     Assessment/Plan   Marce was seen today for urinary frequency, hypertension and pain.    Diagnoses and all orders for this visit:    Frequency of urination  -     POCT urinalysis dipstick, automated  -     POCT urinalysis dipstick, automated  -     sulfamethoxazole-trimethoprim (BACTRIM DS,SEPTRA DS) 800-160 MG per tablet; Take 1 tablet by mouth 2 (Two) Times a Day.    Acute cystitis without hematuria  -     sulfamethoxazole-trimethoprim (BACTRIM DS,SEPTRA DS) 800-160 MG per tablet; Take 1 tablet by mouth 2 (Two) Times a Day.  -     Urine  Culture - Urine, Urine, Clean Catch    Essential hypertension  -     Comprehensive Metabolic Panel  -     Lipid Panel    Chronic constipation  -     polyethylene glycol (MIRALAX) powder powder; Take 17 g by mouth Daily.    Elevated hemoglobin A1c measurement  -     Hemoglobin A1c    Prediabetes  -     Hemoglobin A1c      Will check the labs to see if any new treatment is needed for the blood sugars.  Treating the UTI with bactrim.  I believe the left basilar crackles are chronic and secondary to scar tissue.  Conservative treatment of the constipation and miralax.  Cannot afford the linzess.  Follow up with the pain management doctors and continue the other medications as listed.  Follow up with the cardiologist.  May need to see electrophysiology.

## 2019-11-05 NOTE — PATIENT INSTRUCTIONS
Urinary Tract Infection, Adult  A urinary tract infection (UTI) is an infection of any part of the urinary tract. The urinary tract includes:  · The kidneys.  · The ureters.  · The bladder.  · The urethra.  These organs make, store, and get rid of pee (urine) in the body.  What are the causes?  This is caused by germs (bacteria) in your genital area. These germs grow and cause swelling (inflammation) of your urinary tract.  What increases the risk?  You are more likely to develop this condition if:  · You have a small, thin tube (catheter) to drain pee.  · You cannot control when you pee or poop (incontinence).  · You are female, and:  ? You use these methods to prevent pregnancy:  ? A medicine that kills sperm (spermicide).  ? A device that blocks sperm (diaphragm).  ? You have low levels of a female hormone (estrogen).  ? You are pregnant.  · You have genes that add to your risk.  · You are sexually active.  · You take antibiotic medicines.  · You have trouble peeing because of:  ? A prostate that is bigger than normal, if you are male.  ? A blockage in the part of your body that drains pee from the bladder (urethra).  ? A kidney stone.  ? A nerve condition that affects your bladder (neurogenic bladder).  ? Not getting enough to drink.  ? Not peeing often enough.  · You have other conditions, such as:  ? Diabetes.  ? A weak disease-fighting system (immune system).  ? Sickle cell disease.  ? Gout.  ? Injury of the spine.  What are the signs or symptoms?  Symptoms of this condition include:  · Needing to pee right away (urgently).  · Peeing often.  · Peeing small amounts often.  · Pain or burning when peeing.  · Blood in the pee.  · Pee that smells bad or not like normal.  · Trouble peeing.  · Pee that is cloudy.  · Fluid coming from the vagina, if you are female.  · Pain in the belly or lower back.  Other symptoms include:  · Throwing up (vomiting).  · No urge to eat.  · Feeling mixed up (confused).  · Being tired  and grouchy (irritable).  · A fever.  · Watery poop (diarrhea).  How is this treated?  This condition may be treated with:  · Antibiotic medicine.  · Other medicines.  · Drinking enough water.  Follow these instructions at home:    Medicines  · Take over-the-counter and prescription medicines only as told by your doctor.  · If you were prescribed an antibiotic medicine, take it as told by your doctor. Do not stop taking it even if you start to feel better.  General instructions  · Make sure you:  ? Pee until your bladder is empty.   ? Do not hold pee for a long time.  ? Empty your bladder after sex.  ? Wipe from front to back after pooping if you are a female. Use each tissue one time when you wipe.  · Drink enough fluid to keep your pee pale yellow.  · Keep all follow-up visits as told by your doctor. This is important.  Contact a doctor if:  · You do not get better after 1-2 days.  · Your symptoms go away and then come back.  Get help right away if:  · You have very bad back pain.  · You have very bad pain in your lower belly.  · You have a fever.  · You are sick to your stomach (nauseous).  · You are throwing up.  Summary  · A urinary tract infection (UTI) is an infection of any part of the urinary tract.  · This condition is caused by germs in your genital area.  · There are many risk factors for a UTI. These include having a small, thin tube to drain pee and not being able to control when you pee or poop.  · Treatment includes antibiotic medicines for germs.  · Drink enough fluid to keep your pee pale yellow.  This information is not intended to replace advice given to you by your health care provider. Make sure you discuss any questions you have with your health care provider.  Document Released: 06/05/2009 Document Revised: 06/27/2019 Document Reviewed: 06/27/2019  [employer] Interactive Patient Education © 2019 [employer]    Constipation, Adult  Constipation is when a person:  · Poops (has a bowel movement)  fewer times in a week than normal.  · Has a hard time pooping.  · Has poop that is dry, hard, or bigger than normal.  Follow these instructions at home:  Eating and drinking    · Eat foods that have a lot of fiber, such as:  ? Fresh fruits and vegetables.  ? Whole grains.  ? Beans.  · Eat less of foods that are high in fat, low in fiber, or overly processed, such as:  ? French fries.  ? Hamburgers.  ? Cookies.  ? Candy.  ? Soda.  · Drink enough fluid to keep your pee (urine) clear or pale yellow.  General instructions  · Exercise regularly or as told by your doctor.  · Go to the restroom when you feel like you need to poop. Do not hold it in.  · Take over-the-counter and prescription medicines only as told by your doctor. These include any fiber supplements.  · Do pelvic floor retraining exercises, such as:  ? Doing deep breathing while relaxing your lower belly (abdomen).  ? Relaxing your pelvic floor while pooping.  · Watch your condition for any changes.  · Keep all follow-up visits as told by your doctor. This is important.  Contact a doctor if:  · You have pain that gets worse.  · You have a fever.  · You have not pooped for 4 days.  · You throw up (vomit).  · You are not hungry.  · You lose weight.  · You are bleeding from the anus.  · You have thin, pencil-like poop (stool).  Get help right away if:  · You have a fever, and your symptoms suddenly get worse.  · You leak poop or have blood in your poop.  · Your belly feels hard or bigger than normal (is bloated).  · You have very bad belly pain.  · You feel dizzy or you faint.  This information is not intended to replace advice given to you by your health care provider. Make sure you discuss any questions you have with your health care provider.  Document Released: 06/05/2009 Document Revised: 07/07/2017 Document Reviewed: 06/07/2017  [employer] Interactive Patient Education © 2019 [employer]

## 2019-11-08 LAB
ALBUMIN SERPL-MCNC: 4.3 G/DL (ref 3.5–5.2)
ALBUMIN/GLOB SERPL: 2.2 G/DL
ALP SERPL-CCNC: 63 U/L (ref 39–117)
ALT SERPL-CCNC: 13 U/L (ref 1–33)
AST SERPL-CCNC: 18 U/L (ref 1–32)
BACTERIA UR CULT: ABNORMAL
BACTERIA UR CULT: ABNORMAL
BILIRUB SERPL-MCNC: 0.8 MG/DL (ref 0.2–1.2)
BUN SERPL-MCNC: 11 MG/DL (ref 8–23)
BUN/CREAT SERPL: 19.3 (ref 7–25)
CALCIUM SERPL-MCNC: 9.1 MG/DL (ref 8.6–10.5)
CHLORIDE SERPL-SCNC: 99 MMOL/L (ref 98–107)
CHOLEST SERPL-MCNC: 140 MG/DL (ref 0–200)
CO2 SERPL-SCNC: 30.8 MMOL/L (ref 22–29)
CREAT SERPL-MCNC: 0.57 MG/DL (ref 0.57–1)
GLOBULIN SER CALC-MCNC: 2 GM/DL
GLUCOSE SERPL-MCNC: 99 MG/DL (ref 65–99)
HBA1C MFR BLD: 6.2 % (ref 4.8–5.6)
HDLC SERPL-MCNC: 49 MG/DL (ref 40–60)
LDLC SERPL CALC-MCNC: 78 MG/DL (ref 0–100)
OTHER ANTIBIOTIC SUSC ISLT: ABNORMAL
POTASSIUM SERPL-SCNC: 4.6 MMOL/L (ref 3.5–5.2)
PROT SERPL-MCNC: 6.3 G/DL (ref 6–8.5)
SODIUM SERPL-SCNC: 141 MMOL/L (ref 136–145)
TRIGL SERPL-MCNC: 64 MG/DL (ref 0–150)
VLDLC SERPL CALC-MCNC: 12.8 MG/DL

## 2019-11-13 ENCOUNTER — OFFICE VISIT (OUTPATIENT)
Dept: FAMILY MEDICINE CLINIC | Facility: CLINIC | Age: 84
End: 2019-11-13

## 2019-11-13 VITALS
BODY MASS INDEX: 25.39 KG/M2 | SYSTOLIC BLOOD PRESSURE: 108 MMHG | DIASTOLIC BLOOD PRESSURE: 58 MMHG | HEART RATE: 64 BPM | OXYGEN SATURATION: 95 % | TEMPERATURE: 97.4 F | WEIGHT: 158 LBS | HEIGHT: 66 IN

## 2019-11-13 DIAGNOSIS — S40.812A ABRASION OF LEFT UPPER EXTREMITY, INITIAL ENCOUNTER: Primary | ICD-10-CM

## 2019-11-13 DIAGNOSIS — J20.8 ACUTE BRONCHITIS DUE TO OTHER SPECIFIED ORGANISMS: ICD-10-CM

## 2019-11-13 PROCEDURE — 99214 OFFICE O/P EST MOD 30 MIN: CPT | Performed by: INTERNAL MEDICINE

## 2019-11-13 RX ORDER — BENZONATATE 100 MG/1
CAPSULE ORAL
COMMUNITY
Start: 2019-11-11 | End: 2020-01-28

## 2019-11-13 RX ORDER — AMOXICILLIN 500 MG/1
CAPSULE ORAL
COMMUNITY
Start: 2019-11-11 | End: 2019-11-25
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
## 2019-11-13 NOTE — PATIENT INSTRUCTIONS
Wound Care, Adult  Taking care of your wound properly can help to prevent pain, infection, and scarring. It can also help your wound to heal more quickly.  How to care for your wound  Wound care         · Follow instructions from your health care provider about how to take care of your wound. Make sure you:  ? Wash your hands with soap and water before you change the bandage (dressing). If soap and water are not available, use hand .  ? Change your dressing as told by your health care provider.  ? Leave stitches (sutures), skin glue, or adhesive strips in place. These skin closures may need to stay in place for 2 weeks or longer. If adhesive strip edges start to loosen and curl up, you may trim the loose edges. Do not remove adhesive strips completely unless your health care provider tells you to do that.  · Check your wound area every day for signs of infection. Check for:  ? Redness, swelling, or pain.  ? Fluid or blood.  ? Warmth.  ? Pus or a bad smell.  · Ask your health care provider if you should clean the wound with mild soap and water. Doing this may include:  ? Using a clean towel to pat the wound dry after cleaning it. Do not rub or scrub the wound.  ? Applying a cream or ointment. Do this only as told by your health care provider.  ? Covering the incision with a clean dressing.  · Ask your health care provider when you can leave the wound uncovered.  · Keep the dressing dry until your health care provider says it can be removed. Do not take baths, swim, use a hot tub, or do anything that would put the wound underwater until your health care provider approves. Ask your health care provider if you can take showers. You may only be allowed to take sponge baths.  Medicines    · If you were prescribed an antibiotic medicine, cream, or ointment, take or use the antibiotic as told by your health care provider. Do not stop taking or using the antibiotic even if your condition improves.  · Take  over-the-counter and prescription medicines only as told by your health care provider. If you were prescribed pain medicine, take it 30 or more minutes before you do any wound care or as told by your health care provider.  General instructions  · Return to your normal activities as told by your health care provider. Ask your health care provider what activities are safe.  · Do not scratch or pick at the wound.  · Do not use any products that contain nicotine or tobacco, such as cigarettes and e-cigarettes. These may delay wound healing. If you need help quitting, ask your health care provider.  · Keep all follow-up visits as told by your health care provider. This is important.  · Eat a diet that includes protein, vitamin A, vitamin C, and other nutrient-rich foods to help the wound heal.  ? Foods rich in protein include meat, dairy, beans, nuts, and other sources.  ? Foods rich in vitamin A include carrots and dark green, leafy vegetables.  ? Foods rich in vitamin C include citrus, tomatoes, and other fruits and vegetables.  ? Nutrient-rich foods have protein, carbohydrates, fat, vitamins, or minerals. Eat a variety of healthy foods including vegetables, fruits, and whole grains.  Contact a health care provider if:  · You received a tetanus shot and you have swelling, severe pain, redness, or bleeding at the injection site.  · Your pain is not controlled with medicine.  · You have redness, swelling, or pain around the wound.  · You have fluid or blood coming from the wound.  · Your wound feels warm to the touch.  · You have pus or a bad smell coming from the wound.  · You have a fever or chills.  · You are nauseous or you vomit.  · You are dizzy.  Get help right away if:  · You have a red streak going away from your wound.  · The edges of the wound open up and separate.  · Your wound is bleeding, and the bleeding does not stop with gentle pressure.  · You have a rash.  · You faint.  · You have trouble  breathing.  Summary  · Always wash your hands with soap and water before changing your bandage (dressing).  · To help with healing, eat foods that are rich in protein, vitamin A, vitamin C, and other nutrients.  · Check your wound every day for signs of infection. Contact your health care provider if you suspect that your wound is infected.  This information is not intended to replace advice given to you by your health care provider. Make sure you discuss any questions you have with your health care provider.  Document Released: 09/26/2009 Document Revised: 01/29/2019 Document Reviewed: 07/04/2017  [employer] Interactive Patient Education © 2019 [employer]

## 2019-11-15 ENCOUNTER — TELEPHONE (OUTPATIENT)
Dept: FAMILY MEDICINE CLINIC | Facility: CLINIC | Age: 84
End: 2019-11-15

## 2019-11-18 NOTE — TELEPHONE ENCOUNTER
PT wanted me to let you know that they are working it out and going to try to stay home a little longer instead of going to an assisted living.

## 2019-11-25 ENCOUNTER — TELEPHONE (OUTPATIENT)
Dept: FAMILY MEDICINE CLINIC | Facility: CLINIC | Age: 84
End: 2019-11-25

## 2019-11-25 DIAGNOSIS — N30.00 ACUTE CYSTITIS WITHOUT HEMATURIA: Primary | ICD-10-CM

## 2019-11-25 RX ORDER — AMOXICILLIN AND CLAVULANATE POTASSIUM 875; 125 MG/1; MG/1
1 TABLET, FILM COATED ORAL 2 TIMES DAILY
Qty: 20 TABLET | Refills: 0 | Status: SHIPPED | OUTPATIENT
Start: 2019-11-25 | End: 2019-11-27 | Stop reason: SDUPTHER

## 2019-11-25 NOTE — TELEPHONE ENCOUNTER
Patient called stating she is having the same Urinary issue since she has finished her med.  States she was given qty 14 Amoxi.  Now that she is out of those, she is having urinary freq, and it hurts/ burns when she goes.       Would like some more meds called into the [facility] on file to help her through the holidays.

## 2019-11-27 RX ORDER — AMOXICILLIN AND CLAVULANATE POTASSIUM 875; 125 MG/1; MG/1
1 TABLET, FILM COATED ORAL 2 TIMES DAILY
Qty: 20 TABLET | Refills: 0 | Status: SHIPPED | OUTPATIENT
Start: 2019-11-27 | End: 2019-12-17 | Stop reason: SDUPTHER

## 2019-12-03 ENCOUNTER — TELEPHONE (OUTPATIENT)
Dept: FAMILY MEDICINE CLINIC | Facility: CLINIC | Age: 84
End: 2019-12-03

## 2019-12-03 NOTE — TELEPHONE ENCOUNTER
Tinnitus.  This is a cardiology medication she is getting it from her cardiologist.  I believe it is Dr. Breaux.

## 2019-12-03 NOTE — TELEPHONE ENCOUNTER
Pt called in for refill on medication     propafenone (RYTHMOL) 150 MG tablet    Medication is in chart, but we did not perscribe it to her. If you fill please send to     [id number] - [place], [place] - [address] AT [facility] OF [facility] - [phone]  - [phone] (Phone)  [phone] (Fax)

## 2019-12-11 ENCOUNTER — TELEPHONE (OUTPATIENT)
Dept: FAMILY MEDICINE CLINIC | Facility: CLINIC | Age: 84
End: 2019-12-11

## 2019-12-11 NOTE — TELEPHONE ENCOUNTER
Patient called and wants Dr Khalil to watch for paperwork regarding her son, Sergey. Would not elaborate

## 2019-12-12 ENCOUNTER — TELEPHONE (OUTPATIENT)
Dept: FAMILY MEDICINE CLINIC | Facility: CLINIC | Age: 84
End: 2019-12-12

## 2019-12-12 NOTE — TELEPHONE ENCOUNTER
Spoke with Dr Khalil about transport services , he mentioned [employer] has a transport to medical appointment that they can try. I called Sergey the son and left message for call back due to patient not being able to understand how to use [employer].
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
## 2019-12-13 ENCOUNTER — TELEPHONE (OUTPATIENT)
Dept: FAMILY MEDICINE CLINIC | Facility: CLINIC | Age: 84
End: 2019-12-13

## 2019-12-13 NOTE — TELEPHONE ENCOUNTER
Patient states she was in on 11/13 and that she says she was referred by Dr Khalil to see a Dr Colmenares (She thinks that is the spelling) for her back pain.  She says she was certain that it was supposed to be at 1:45 on 12/13.  But she didn't know where he was located and called some one (doesn't remember who) and got a phone number but when she called, they didn't have a appointment for her.  I didn't see any referrals in the system.  She wants to know if he remembers anything about it or not?  She says her back and legs and back are hurting really bad and will probably go the emergency room because she's in a lot of pain  Her phone number is  [phone]

## 2019-12-17 ENCOUNTER — OFFICE VISIT (OUTPATIENT)
Dept: FAMILY MEDICINE CLINIC | Facility: CLINIC | Age: 84
End: 2019-12-17

## 2019-12-17 VITALS
BODY MASS INDEX: 24.91 KG/M2 | SYSTOLIC BLOOD PRESSURE: 118 MMHG | TEMPERATURE: 97.8 F | WEIGHT: 155 LBS | OXYGEN SATURATION: 95 % | HEIGHT: 66 IN | DIASTOLIC BLOOD PRESSURE: 64 MMHG | HEART RATE: 78 BPM

## 2019-12-17 DIAGNOSIS — N30.00 ACUTE CYSTITIS WITHOUT HEMATURIA: ICD-10-CM

## 2019-12-17 DIAGNOSIS — L89.152 DECUBITUS ULCER OF SACRAL REGION, STAGE 2 (HCC): ICD-10-CM

## 2019-12-17 DIAGNOSIS — F39 MOOD DISORDER (HCC): ICD-10-CM

## 2019-12-17 DIAGNOSIS — R35.0 FREQUENCY OF URINATION: Primary | ICD-10-CM

## 2019-12-17 LAB
BILIRUB BLD-MCNC: NEGATIVE MG/DL
CLARITY, POC: ABNORMAL
COLOR UR: ABNORMAL
GLUCOSE UR STRIP-MCNC: NEGATIVE MG/DL
KETONES UR QL: NEGATIVE
LEUKOCYTE EST, POC: ABNORMAL
NITRITE UR-MCNC: NEGATIVE MG/ML
PH UR: 6 [PH] (ref 5–8)
PROT UR STRIP-MCNC: NEGATIVE MG/DL
RBC # UR STRIP: ABNORMAL /UL
SP GR UR: 1.01 (ref 1–1.03)
UROBILINOGEN UR QL: NORMAL

## 2019-12-17 PROCEDURE — 81003 URINALYSIS AUTO W/O SCOPE: CPT | Performed by: INTERNAL MEDICINE

## 2019-12-17 PROCEDURE — 99214 OFFICE O/P EST MOD 30 MIN: CPT | Performed by: INTERNAL MEDICINE

## 2019-12-17 RX ORDER — DICYCLOMINE HCL 20 MG
TABLET ORAL
COMMUNITY
End: 2020-07-01

## 2019-12-17 RX ORDER — AMOXICILLIN AND CLAVULANATE POTASSIUM 875; 125 MG/1; MG/1
1 TABLET, FILM COATED ORAL 2 TIMES DAILY
Qty: 20 TABLET | Refills: 0 | Status: SHIPPED | OUTPATIENT
Start: 2019-12-17 | End: 2020-01-28

## 2019-12-17 RX ORDER — IBUPROFEN 800 MG/1
TABLET ORAL
Refills: 0 | COMMUNITY
Start: 2019-11-29 | End: 2020-10-07

## 2019-12-17 RX ORDER — DOXYCYCLINE HYCLATE 100 MG/1
CAPSULE ORAL
COMMUNITY
End: 2020-05-14

## 2019-12-17 RX ORDER — HYDROCODONE BITARTRATE AND ACETAMINOPHEN 7.5; 325 MG/1; MG/1
7.5 TABLET ORAL
Refills: 0 | COMMUNITY
Start: 2019-12-13 | End: 2020-07-01

## 2019-12-17 RX ORDER — PANTOPRAZOLE SODIUM 40 MG/1
TABLET, DELAYED RELEASE ORAL
COMMUNITY
End: 2019-12-17

## 2019-12-17 NOTE — PROGRESS NOTES
"Subjective   Marce Williamson is a 84 y.o. female.     Chief Complaint   Patient presents with   • Urinary Frequency     burning       History of Present Illness   Complains of pain in the tailbone and \"I think my tailbone is working its way through the skin\" but no drainage or bleeding.  Patient has had recurrent UTIs.  When was seen at Dr Dias office showed the antibiotic for her UTI and then lost or forgot the antibiotic and was not able to complete the antibiotic series.  Now with urinary frequency, urgency, urinary incontenence, vaginal discomfort has returned.  Saw Dr Patel 12/13/19 for the pain and leg pain and was given hydrocodone.  Also, changed the gabapentin to 400mg QID.  Still with burning in the legs.      The following portions of the patient's history were reviewed and updated as appropriate: allergies, current medications, past family history, past medical history, past social history, past surgical history and problem list.    Past Medical History:   Diagnosis Date   • Abdominal cramping    • Abdominal pain, left lateral    • Abnormal CT of the abdomen    • Allergic rhinitis    • Anxiety    • Arthralgia of multiple sites    • Arthritis    • Atrial fibrillation (CMS/HCC)    • Back pain    • BMI 25.0-25.9,adult    • Breast lump    • Calf pain    • Cervical spondylosis    • Constipation    • DDD (degenerative disc disease), lumbar    • Difficulty swallowing    • Diverticulitis of colon    • Dysphagia    • Dysuria    • Easy bruising    • Edema    • Elevated d-dimer    • Episodic tension-type headache, not intractable    • External hemorrhoids    • Fatigue    • Full thickness burn of trunk    • Generalized osteoarthritis of multiple sites    • Headache    • Heart murmur    • Heme positive stool    • Hiatal hernia    • High risk medication use    • History of colonoscopy    • History of echocardiogram 02/22/2019    EF 63% There is fibrocalcific sclerosis of the aortic valve without evidence of " aortic stenosis. Mild TR. Trace MR.    • History of leukocytosis    • History of pneumonia     Left lower lobe   • History of rectal bleeding     History of blood per rectum-Abstracted from [facility]   • History of stress test 01/23/2017    Probably normal Lexiscan cardiolite stress. No evidence of significant pharmacologic induced ischemia. No previos infarctions zones. Normal LV systolic function.    • History of transfusion     AFTER HIATAL HERNIA SX   • Hypertension    • Irregular cardiac rhythm    • Irritable bowel syndrome    • Left flank pain    • Left kidney mass    • Left lower quadrant pain    • Localized osteoarthritis of left shoulder    • Low back pain    • Lumbar foraminal stenosis    • Middle ear effusion    • Nausea     Nausea alone-Abstracted from [facility]   • Neural foraminal stenosis of cervical spine    • Nontoxic single thyroid nodule    • Osteopenia    • Other screening mammogram    • Pain in both lower extremities    • Pain in joint of right hip    • Pain in right buttock    • Pain of both hip joints    • Pain of upper extremity    • Polyp of sigmoid colon    • Rectal pain    • Renal cyst, left    • Sciatic pain, right    • Short of breath on exertion    • Shortness of breath    • Shoulder pain    • Skin lesion    • Swelling of lower extremity    • Throat mass     CYST ON RIGHT SIDE   • Tinnitus, right    • Urinary frequency    • Urinary tract infection    • Vitamin B12 deficiency    • Vitamin C deficiency    • Vulvovaginitis    • Weight loss        Past Surgical History:   Procedure Laterality Date   • ANTERIOR CERVICAL DISCECTOMY W/ FUSION N/A 4/13/2016    Procedure: C-4-C-6 CERVICAL DISCECTOMY ANTERIOR FUSION WITH INSTRUMENTATION;  Surgeon: Blaine Jim DO;  Location: [facility];  Service:    • BACK SURGERY      Dr. Stringer 2001 and Dr. Warren 2002   • CHOLECYSTECTOMY     • COLONOSCOPY      4/6/17 Normal, 3/2013 with diverticuli Dr. Dacosta   • HERNIA REPAIR     • HYSTERECTOMY     • LUMBAR  FUSION      L1-L3 fusion   • REPLACEMENT TOTAL KNEE Right    • SHOULDER SURGERY         Family History   Problem Relation Age of Onset   • Heart failure Mother         CONGESTIVE   • Breast cancer Father    • Emphysema Father    • Hypertension Father    • Breast cancer Sister    • Other Sister         POLIO, 1963   • Diabetes Son    • Pancreatic cancer Son    • No Known Problems Other        Social History     Socioeconomic History   • Marital status:      Spouse name: Not on file   • Number of children: Not on file   • Years of education: Not on file   • Highest education level: Not on file   Tobacco Use   • Smoking status: Never Smoker   • Smokeless tobacco: Never Used   Substance and Sexual Activity   • Alcohol use: No   • Drug use: No   • Sexual activity: Defer       Current Outpatient Medications   Medication Sig Dispense Refill   • acebutolol (SECTRAL) 200 MG capsule TAKE 1 CAPSULE EVERY DAY 90 capsule 3   • aspirin 81 MG EC tablet Take 81 mg by mouth Daily.     • benzonatate (TESSALON) 100 MG capsule      • dicyclomine (BENTYL) 20 MG tablet dicyclomine 20 mg tablet     • doxycycline (VIBRAMYCIN) 100 MG capsule doxycycline hyclate 100 mg capsule     • gabapentin (NEURONTIN) 400 MG capsule Take 2 capsules by mouth 3 (Three) Times a Day. (Patient taking differently: Take 800 mg by mouth 2 (Two) Times a Day.) 180 capsule 1   • HYDROcodone-acetaminophen (NORCO) 7.5-325 MG per tablet 7.5 tablets.  0   • ibuprofen (ADVIL,MOTRIN) 800 MG tablet   0   • lisinopril (PRINIVIL,ZESTRIL) 5 MG tablet Take 1 tablet by mouth Daily. take 1 tablet by mouth once daily 90 tablet 1   • meloxicam (MOBIC) 15 MG tablet Take 1 tablet by mouth Daily. 90 tablet 1   • omeprazole (priLOSEC) 20 MG capsule Take 1 capsule by mouth Daily. 90 capsule 3   • polyethylene glycol (MIRALAX) powder powder Take 17 g by mouth Daily. 500 g 1   • propafenone (RYTHMOL) 150 MG tablet Take 1 tablet by mouth Every 12 (Twelve) Hours. 60 tablet 5   •  amoxicillin-clavulanate (AUGMENTIN) 875-125 MG per tablet Take 1 tablet by mouth 2 (Two) Times a Day. 20 tablet 0   • pantoprazole (PROTONIX) 40 MG EC tablet pantoprazole 40 mg tablet,delayed release       No current facility-administered medications for this visit.        Review of Systems   Constitutional: Negative for activity change, appetite change, fatigue, fever, unexpected weight gain and unexpected weight loss.   HENT: Negative for nosebleeds, rhinorrhea, trouble swallowing and voice change.    Eyes: Negative for visual disturbance.   Respiratory: Negative for cough, chest tightness, shortness of breath and wheezing.    Cardiovascular: Negative for chest pain, palpitations and leg swelling.   Gastrointestinal: Negative for abdominal pain, blood in stool, constipation, diarrhea, nausea, vomiting, GERD and indigestion.   Genitourinary: Positive for dysuria, frequency, urgency and urinary incontinence. Negative for hematuria.   Musculoskeletal: Positive for arthralgias and back pain. Negative for myalgias.   Skin: Negative for rash and bruise.   Neurological: Negative for dizziness, tremors, weakness, light-headedness, numbness, headache and memory problem.   Hematological: Negative for adenopathy. Does not bruise/bleed easily.   Psychiatric/Behavioral: Negative for sleep disturbance and depressed mood. The patient is not nervous/anxious.        Objective   Vitals:    12/17/19 1514   BP: 118/64   Pulse: 78   Temp: 97.8 °F (36.6 °C)   SpO2: 95%     Body mass index is 25.03 kg/m².  Physical Exam   Constitutional: She is oriented to person, place, and time. She appears well-developed and well-nourished. No distress.   HENT:   Head: Normocephalic and atraumatic.   Right Ear: External ear normal.   Left Ear: External ear normal.   Nose: Nose normal.   Mouth/Throat: Oropharynx is clear and moist.   Eyes: Pupils are equal, round, and reactive to light. Conjunctivae and EOM are normal.   Neck: Normal range of motion.  Neck supple. No tracheal deviation present. No thyromegaly present.   Cardiovascular: Normal rate, regular rhythm, normal heart sounds and intact distal pulses. Exam reveals no gallop and no friction rub.   No murmur heard.  Pulmonary/Chest: Effort normal and breath sounds normal. No respiratory distress.   Abdominal: Soft. Bowel sounds are normal. She exhibits no mass. There is no tenderness. There is no guarding.   Musculoskeletal: Normal range of motion. She exhibits no edema.   Lymphadenopathy:     She has no cervical adenopathy.   Neurological: She is alert and oriented to person, place, and time. She displays normal reflexes. She exhibits normal muscle tone.   Skin: Skin is warm and dry. Capillary refill takes less than 2 seconds. No rash noted. She is not diaphoretic.   3 mm stage 2-3 decubitus ulcer with no active drainage or erythema.   Psychiatric: She has a normal mood and affect. Her behavior is normal. Judgment and thought content normal.   Nursing note and vitals reviewed.      Recent Results (from the past 2016 hour(s))   Doppler Ankle Brachial Index Single Level CAR    Collection Time: 09/25/19  3:06 PM   Result Value Ref Range    RIGHT GREAT TOE SYS MAX 68 mmHg    RIGHT TBI RATIO 0.53     LEFT GREAT TOE SYS MAX 70 mmHg    LEFT ANKIT RATIO 1.57     LEFT TBI RATIO 0.54     Upper arterial right arm brachial sys max 123 mmHg    Upper arterial left arm brachial sys max 129 mmHg   POCT urinalysis dipstick, automated    Collection Time: 11/05/19 11:48 AM   Result Value Ref Range    Color Yellow Yellow, Straw, Dark Yellow, Nilam    Clarity, UA Clear Clear    Specific Gravity  1.015 1.005 - 1.030    pH, Urine 6.5 5.0 - 8.0    Leukocytes Large (3+) (A) Negative    Nitrite, UA Positive (A) Negative    Protein, POC Trace (A) Negative mg/dL    Glucose, UA Negative Negative, 1000 mg/dL (3+) mg/dL    Ketones, UA Negative Negative    Urobilinogen, UA Normal Normal    Bilirubin Negative Negative    Blood, UA 1+ (A)  Negative   Hemoglobin A1c    Collection Time: 11/05/19 12:37 PM   Result Value Ref Range    Hemoglobin A1C 6.20 (H) 4.80 - 5.60 %   Comprehensive Metabolic Panel    Collection Time: 11/05/19 12:37 PM   Result Value Ref Range    Glucose 99 65 - 99 mg/dL    BUN 11 8 - 23 mg/dL    Creatinine 0.57 0.57 - 1.00 mg/dL    eGFR Non African Am 101 >60 mL/min/1.73    eGFR African Am 122 >60 mL/min/1.73    BUN/Creatinine Ratio 19.3 7.0 - 25.0    Sodium 141 136 - 145 mmol/L    Potassium 4.6 3.5 - 5.2 mmol/L    Chloride 99 98 - 107 mmol/L    Total CO2 30.8 (H) 22.0 - 29.0 mmol/L    Calcium 9.1 8.6 - 10.5 mg/dL    Total Protein 6.3 6.0 - 8.5 g/dL    Albumin 4.30 3.50 - 5.20 g/dL    Globulin 2.0 gm/dL    A/G Ratio 2.2 g/dL    Total Bilirubin 0.8 0.2 - 1.2 mg/dL    Alkaline Phosphatase 63 39 - 117 U/L    AST (SGOT) 18 1 - 32 U/L    ALT (SGPT) 13 1 - 33 U/L   Lipid Panel    Collection Time: 11/05/19 12:37 PM   Result Value Ref Range    Total Cholesterol 140 0 - 200 mg/dL    Triglycerides 64 0 - 150 mg/dL    HDL Cholesterol 49 40 - 60 mg/dL    VLDL Cholesterol 12.8 mg/dL    LDL Cholesterol  78 0 - 100 mg/dL   Urine Culture - Urine, Urine, Clean Catch    Collection Time: 11/05/19 12:37 PM   Result Value Ref Range    Urine Culture Final report (A)     Result 1 Escherichia coli (A)     Susceptibility Testing Comment    POCT urinalysis dipstick, automated    Collection Time: 12/17/19  3:35 PM   Result Value Ref Range    Color Dark Yellow Yellow, Straw, Dark Yellow, Nilam    Clarity, UA Cloudy (A) Clear    Specific Gravity  1.010 1.005 - 1.030    pH, Urine 6.0 5.0 - 8.0    Leukocytes Large (3+) (A) Negative    Nitrite, UA Negative Negative    Protein, POC Negative Negative mg/dL    Glucose, UA Negative Negative, 1000 mg/dL (3+) mg/dL    Ketones, UA Negative Negative    Urobilinogen, UA Normal Normal    Bilirubin Negative Negative    Blood, UA Trace (A) Negative     Assessment/Plan   Marce was seen today for urinary frequency.    Diagnoses  and all orders for this visit:    Frequency of urination  -     POCT urinalysis dipstick, automated    Acute cystitis without hematuria      Will restart the augmentin antibiotic for the UTI.  Will await the culture to see if any change is needed.  Discussed the chronic leg and back pain and to continue to follow up with Dr Patel.  Patient was seen and counselled with time of office visit of 35 minutes and greater than 50% of time face to face counseling.  Discussed and reviewed her concern about her  with health and memory issues and fear of ending up in a home and fear of son leaving her.   Reviewed and recommend counseling, mood, medications, risks and benefits of treatment, etc.

## 2019-12-19 LAB
BACTERIA UR CULT: NO GROWTH
BACTERIA UR CULT: NORMAL

## 2019-12-24 ENCOUNTER — TELEPHONE (OUTPATIENT)
Dept: FAMILY MEDICINE CLINIC | Facility: CLINIC | Age: 84
End: 2019-12-24

## 2019-12-24 NOTE — TELEPHONE ENCOUNTER
Indu arnold/ [facility] called to inform you that they will not be able to do HH, because she is not home bound. The have attempted to contact pt at least 2 times to evaluate and she has been out shopping.

## 2020-01-06 ENCOUNTER — TRANSCRIBE ORDERS (OUTPATIENT)
Dept: ADMINISTRATIVE | Facility: HOSPITAL | Age: 85
End: 2020-01-06

## 2020-01-06 DIAGNOSIS — S32.9XXA: ICD-10-CM

## 2020-01-06 DIAGNOSIS — S32.2XXB: Primary | ICD-10-CM

## 2020-01-06 DIAGNOSIS — S32.10XB: Primary | ICD-10-CM

## 2020-01-15 ENCOUNTER — TELEPHONE (OUTPATIENT)
Dept: FAMILY MEDICINE CLINIC | Facility: CLINIC | Age: 85
End: 2020-01-15

## 2020-01-20 ENCOUNTER — HOSPITAL ENCOUNTER (OUTPATIENT)
Dept: MRI IMAGING | Facility: HOSPITAL | Age: 85
Discharge: HOME | End: 2020-01-20
Admitting: PAIN MEDICINE

## 2020-01-20 DIAGNOSIS — S32.9XXA: ICD-10-CM

## 2020-01-20 DIAGNOSIS — S32.2XXB: ICD-10-CM

## 2020-01-20 DIAGNOSIS — S32.10XB: ICD-10-CM

## 2020-01-20 PROCEDURE — 72195 MRI PELVIS W/O DYE: CPT

## 2020-01-28 ENCOUNTER — OFFICE VISIT (OUTPATIENT)
Dept: FAMILY MEDICINE CLINIC | Facility: CLINIC | Age: 85
End: 2020-01-28

## 2020-01-28 ENCOUNTER — TRANSCRIBE ORDERS (OUTPATIENT)
Dept: ADMINISTRATIVE | Facility: HOSPITAL | Age: 85
End: 2020-01-28

## 2020-01-28 VITALS
OXYGEN SATURATION: 97 % | HEART RATE: 71 BPM | WEIGHT: 154 LBS | BODY MASS INDEX: 24.75 KG/M2 | HEIGHT: 66 IN | SYSTOLIC BLOOD PRESSURE: 136 MMHG | DIASTOLIC BLOOD PRESSURE: 68 MMHG

## 2020-01-28 DIAGNOSIS — M79.641 RIGHT HAND PAIN: ICD-10-CM

## 2020-01-28 DIAGNOSIS — L89.152 DECUBITUS ULCER OF SACRAL REGION, STAGE 2 (HCC): ICD-10-CM

## 2020-01-28 DIAGNOSIS — M25.561 ACUTE PAIN OF RIGHT KNEE: ICD-10-CM

## 2020-01-28 DIAGNOSIS — M25.50 ARTHRALGIA OF MULTIPLE SITES: ICD-10-CM

## 2020-01-28 DIAGNOSIS — R35.0 FREQUENCY OF URINATION: ICD-10-CM

## 2020-01-28 DIAGNOSIS — R30.0 DYSURIA: ICD-10-CM

## 2020-01-28 DIAGNOSIS — T21.25XA PARTIAL THICKNESS BURN OF BUTTOCK, INITIAL ENCOUNTER: ICD-10-CM

## 2020-01-28 DIAGNOSIS — Z00.00 MEDICARE ANNUAL WELLNESS VISIT, SUBSEQUENT: Primary | ICD-10-CM

## 2020-01-28 LAB
BILIRUB BLD-MCNC: NEGATIVE MG/DL
CLARITY, POC: CLEAR
COLOR UR: ABNORMAL
GLUCOSE UR STRIP-MCNC: NEGATIVE MG/DL
KETONES UR QL: NEGATIVE
LEUKOCYTE EST, POC: ABNORMAL
NITRITE UR-MCNC: NEGATIVE MG/ML
PH UR: 6.5 [PH] (ref 5–8)
PROT UR STRIP-MCNC: NEGATIVE MG/DL
RBC # UR STRIP: NEGATIVE /UL
SP GR UR: 1.01 (ref 1–1.03)
UROBILINOGEN UR QL: NORMAL

## 2020-01-28 PROCEDURE — 81003 URINALYSIS AUTO W/O SCOPE: CPT | Performed by: INTERNAL MEDICINE

## 2020-01-28 PROCEDURE — 99214 OFFICE O/P EST MOD 30 MIN: CPT | Performed by: INTERNAL MEDICINE

## 2020-01-28 PROCEDURE — 73560 X-RAY EXAM OF KNEE 1 OR 2: CPT | Performed by: INTERNAL MEDICINE

## 2020-01-28 PROCEDURE — G0439 PPPS, SUBSEQ VISIT: HCPCS | Performed by: INTERNAL MEDICINE

## 2020-01-28 PROCEDURE — 73130 X-RAY EXAM OF HAND: CPT | Performed by: INTERNAL MEDICINE

## 2020-01-28 RX ORDER — ROPINIROLE 3 MG/1
3 TABLET, FILM COATED ORAL
COMMUNITY
Start: 2020-01-24 | End: 2020-07-01

## 2020-01-28 NOTE — PATIENT INSTRUCTIONS
Medicare Wellness  Personal Prevention Plan of Service     Date of Office Visit:  2020  Encounter Provider:  Miki Khalil MD  Place of Service:  [facility]  Patient Name: Marce Williamson  :  1935    As part of the Medicare Wellness portion of your visit today, we are providing you with this personalized preventive plan of services (PPPS). This plan is based upon recommendations of the [place] Preventive Services Task Force (USPSTF) and the Advisory Committee on Immunization Practices (ACIP).    This lists the preventive care services that should be considered, and provides dates of when you are due. Items listed as completed are up-to-date and do not require any further intervention.    Health Maintenance   Topic Date Due   • ZOSTER VACCINE (2 of 3) 2012   • MEDICARE ANNUAL WELLNESS  2019   • TDAP/TD VACCINES (3 - Tdap) 2029   • Pneumococcal Vaccine Once at 65 Years Old  Completed   • INFLUENZA VACCINE  Completed   • MAMMOGRAM  Discontinued   • DXA SCAN  Discontinued       Orders Placed This Encounter   Procedures   • Urine Culture - Urine, Urine, Clean Catch   • XR Hand 3+ View Right     Order Specific Question:   Reason for Exam:     Answer:   right hand pain in the 2nd-4th fingers after fall 2020 in parking lot   • XR Knee 1 or 2 View Right     Order Specific Question:   Reason for Exam:     Answer:   right knee pain with history of fall in parking lot on 2020 with pain medially.  history of right total knee replacement.   • POC Urinalysis Dipstick, Automated       No follow-ups on file.

## 2020-01-28 NOTE — PROGRESS NOTES
Subsequent Medicare Wellness Visit   The ABC's of the Annual Wellness Visit    Chief Complaint   Patient presents with   • Pain     R. knee R. hip   • Annual Exam       HPI:  Marce Williamson, -1935, is a 85 y.o. female who presents for a Subsequent Medicare Wellness Visit.  Patient states fell on 2020 leaving the dollar tree pushing the cart outside and it fell over and landed on the right side and hurt the right hand and the right hip and knee.  No ER visit.  Hurts to walk with pain in the right knee and mild chronic pain in the hip.  Upon questioning patient did state that she has Rosy her hip on the right side and someone is giving her Silvadene cream to put on it.  Has a sore in the sacral region is seeing a surgeon for likely debridement in the future.  Patient with dysuria and urinary frequency she has had this off and on for a long time but thinks she may have a urinary tract infection today.    Recent Hospitalizations:  No hospitalization(s) within the last year..    Current Medical Providers:  Patient Care Team:  Miki Khalil MD as PCP - General (Internal Medicine)  Jensen Patel MD as Consulting Physician (Pain Medicine)  Lenny Shin MD as Consulting Physician (Gastroenterology)  Miki Breaux MD as Consulting Physician (Cardiology)  Miguel George MD as Consulting Physician (Neurology)  Chelle Akins MD as Consulting Physician (Orthopedic Surgery)  Blaine Jim DO as Consulting Physician (Orthopedic Surgery)    Health Habits and Functional and Cognitive Screening and Depression Screening:  Functional & Cognitive Status 2020   Do you have difficulty preparing food and eating? No   Do you have difficulty bathing yourself, getting dressed or grooming yourself? No   Do you have difficulty using the toilet? No   Do you have difficulty moving around from place to place? Yes   Do you have trouble with steps or getting out of a bed or a chair?  Yes   Current Diet Well Balanced Diet   Exercise (times per week) 0 times per week   Do you need help using the phone?  No   Are you deaf or do you have serious difficulty hearing?  Yes   Do you need help with transportation? Yes   Do you need help shopping? No   Do you need help preparing meals?  No   Do you need help with housework?  No   Do you need help with laundry? No   Do you need help taking your medications? No   Do you need help managing money? No   Do you ever drive or ride in a car without wearing a seat belt? No   Have you felt unusual stress, anger or loneliness in the last month? Yes   Who do you live with? Spouse   If you need help, do you have trouble finding someone available to you? No   Have you been bothered in the last four weeks by sexual problems? No   Do you have difficulty concentrating, remembering or making decisions? Yes       Compared to one year ago, the patient feels her physical health is the same and her mental health is the same.    Depression Screen:  PHQ-2/PHQ-9 Depression Screening 1/28/2020   Little interest or pleasure in doing things 0   Feeling down, depressed, or hopeless 0   Trouble falling or staying asleep, or sleeping too much 3   Feeling tired or having little energy 1   Poor appetite or overeating 3   Feeling bad about yourself - or that you are a failure or have let yourself or your family down 0   Trouble concentrating on things, such as reading the newspaper or watching television 0   Moving or speaking so slowly that other people could have noticed. Or the opposite - being so fidgety or restless that you have been moving around a lot more than usual 0   Thoughts that you would be better off dead, or of hurting yourself in some way 0   Total Score 7       Falls Risk Assessment:  ISMAEL Fall Risk Clinician Key Questions   Have you fallen in the past year?: Yes  Stay Idependant Patient Questions   Patient Fall Risk Assessment Score : 0  Fall Risk Category:  Moderate          Past Medical/Family/Social History:  The following portions of the patient's history were reviewed and updated as appropriate: allergies, current medications, past family history, past medical history, past social history, past surgical history and problem list.    Allergies   Allergen Reactions   • Keflex  [Cephalexin] Palpitations and Nausea And Vomiting     Amoxicillin is ok   • Metoclopramide Palpitations   • Adhesive Tape Rash   • Pentazocine Unknown - Low Severity     Patient does not know this medication         Current Outpatient Medications:   •  acebutolol (SECTRAL) 200 MG capsule, TAKE 1 CAPSULE EVERY DAY, Disp: 90 capsule, Rfl: 3  •  aspirin 81 MG EC tablet, Take 81 mg by mouth Daily., Disp: , Rfl:   •  gabapentin (NEURONTIN) 400 MG capsule, Take 2 capsules by mouth 3 (Three) Times a Day. (Patient taking differently: Take 800 mg by mouth 2 (Two) Times a Day.), Disp: 180 capsule, Rfl: 1  •  HYDROcodone-acetaminophen (NORCO) 7.5-325 MG per tablet, 7.5 tablets., Disp: , Rfl: 0  •  ibuprofen (ADVIL,MOTRIN) 800 MG tablet, , Disp: , Rfl: 0  •  lisinopril (PRINIVIL,ZESTRIL) 5 MG tablet, Take 1 tablet by mouth Daily. take 1 tablet by mouth once daily, Disp: 90 tablet, Rfl: 1  •  omeprazole (priLOSEC) 20 MG capsule, Take 1 capsule by mouth Daily., Disp: 90 capsule, Rfl: 3  •  polyethylene glycol (MIRALAX) powder powder, Take 17 g by mouth Daily., Disp: 500 g, Rfl: 1  •  propafenone (RYTHMOL) 150 MG tablet, Take 1 tablet by mouth Every 12 (Twelve) Hours., Disp: 60 tablet, Rfl: 5  •  dicyclomine (BENTYL) 20 MG tablet, dicyclomine 20 mg tablet, Disp: , Rfl:   •  doxycycline (VIBRAMYCIN) 100 MG capsule, doxycycline hyclate 100 mg capsule, Disp: , Rfl:   •  meloxicam (MOBIC) 15 MG tablet, Take 1 tablet by mouth Daily., Disp: 90 tablet, Rfl: 1  •  rOPINIRole (REQUIP) 3 MG tablet, 3 mg., Disp: , Rfl:     Aspirin use counseling: Taking ASA appropriately as indicated    Current medication list contains  high risk medications. No harmful drug interactions have been identified.  Plan of action Use the hydrocodone as needed.    Family History   Problem Relation Age of Onset   • Heart failure Mother         CONGESTIVE   • Breast cancer Father    • Emphysema Father    • Hypertension Father    • Breast cancer Sister    • Other Sister         POLIO, 1963   • Diabetes Son    • Pancreatic cancer Son    • No Known Problems Other        Social History     Tobacco Use   • Smoking status: Never Smoker   • Smokeless tobacco: Never Used   Substance Use Topics   • Alcohol use: No       Past Surgical History:   Procedure Laterality Date   • ANTERIOR CERVICAL DISCECTOMY W/ FUSION N/A 4/13/2016    Procedure: C-4-C-6 CERVICAL DISCECTOMY ANTERIOR FUSION WITH INSTRUMENTATION;  Surgeon: Blaine Jim DO;  Location: [facility];  Service:    • BACK SURGERY      Dr. Stringer 2001 and Dr. Warren 2002   • CHOLECYSTECTOMY     • COLONOSCOPY      4/6/17 Normal, 3/2013 with diverticuli Dr. Dacosta   • HERNIA REPAIR     • HYSTERECTOMY     • LUMBAR FUSION      L1-L3 fusion   • REPLACEMENT TOTAL KNEE Right    • SHOULDER SURGERY         Patient Active Problem List   Diagnosis   • Coronary arteriosclerosis in native artery   • Chronic coronary artery disease   • Chest pain   • Palpitations   • Hypertension   • Ventricular premature beats   • Shortness of breath   • Disc disorder of cervical region   • Cervical radiculopathy   • Low back pain   • Pain of lower extremity   • Chronic neck pain   • Pain in thoracic spine   • Cough   • Degeneration of intervertebral disc of lumbar region   • Degeneration of intervertebral disc of thoracic region   • Degeneration of intervertebral disc of thoracolumbar region   • Dizziness   • Dysphagia   • Gastritis   • Gastroesophageal reflux disease without esophagitis   • Goiter   • Left arm pain   • Paroxysmal atrial fibrillation (CMS/HCC)   • Degenerative cervical spinal stenosis   • Atrial fibrillation (CMS/HCC)   •  Epigastric pain   • Abdominal pain   • Other spondylosis with radiculopathy, cervical region   • Chronic constipation   • Non-specific colitis   • Other symptoms and signs involving the musculoskeletal system   • Diverticulosis of intestine   • Elevated troponin I level   • Flatback syndrome   • Impingement syndrome of shoulder region   • Irritable bowel syndrome   • Left lower quadrant pain   • Nausea   • Paresthesia of lower extremity   • Osteoarthritis of knee   • Pulmonary venous congestion   • Rotator cuff tear arthropathy   • Acquired deformity of spine   • Screening mammogram, encounter for   • History of echocardiogram   • History of stress test   • Neuropathy   • Left-sided chest wall pain   • Pain of left breast   • Urinary tract infection   • Elevated hemoglobin A1c measurement   • Prediabetes   • Abrasion of left arm   • Acute bronchitis due to other specified organisms   • Mood disorder (CMS/[facility])   • Decubitus ulcer of sacral region, stage 2 (CMS/[facility])   • Medicare annual wellness visit, subsequent   • Partial thickness burn of buttock   • Arthralgia of multiple sites       Review of Systems   Constitutional: Negative for activity change, appetite change, fatigue, fever, unexpected weight gain and unexpected weight loss.   HENT: Negative for nosebleeds, rhinorrhea, trouble swallowing and voice change.    Eyes: Negative for visual disturbance.   Respiratory: Negative for cough, chest tightness, shortness of breath and wheezing.    Cardiovascular: Negative for chest pain, palpitations and leg swelling.   Gastrointestinal: Negative for abdominal pain, blood in stool, constipation, diarrhea, nausea, vomiting, GERD and indigestion.   Genitourinary: Positive for dysuria and frequency. Negative for hematuria, urgency and vaginal discharge.   Musculoskeletal: Positive for arthralgias. Negative for back pain and myalgias.        Right knee pain mostly on the inside of the knee.  Right hand pain particularly in  "the second through fourth fingers with some mild swelling.   Skin: Negative for rash and bruise.   Neurological: Negative for dizziness, tremors, weakness, light-headedness, numbness, headache and memory problem.   Hematological: Negative for adenopathy. Does not bruise/bleed easily.   Psychiatric/Behavioral: Negative for sleep disturbance and depressed mood. The patient is not nervous/anxious.        Objective     Vitals:    01/28/20 1541 01/28/20 1751   BP: 140/68 136/68   BP Location: Right arm Right arm   Patient Position: Sitting Sitting   Cuff Size: Adult Adult   Pulse: 71    SpO2: 97%    Weight: 69.9 kg (154 lb)    Height: 167.6 cm (65.98\")        Patient's Body mass index is 24.87 kg/m². BMI is within normal parameters. No follow-up required..      No exam data present    The patient has no evidence of cognitve impairment.   MMSE  Orientation   What is the year? Season? Date? Day of the Week? Month? (5 points)score 5    Where are we now: State? Country? Town/city? Hospital? Floor? (5 points)score5    Registration  The examiner names 3 unrelated objects clearly and slowly, then asked the patient to name all 3 of them.  The patient's response is used for scoring. Examiner repeats them until patient learns all of them if possible.(3 points)score3    Attention and Calculation(5 points)  \"I would like you to count backward from 100 by sevens\".(93, 86,79,72,65)  Stop after 5 answers.  Alternative: \"Spell WORLD backwards\"(D-L-R-O-W)(5 points)score4    Recall   \"Earlier I told you the names of 3 things can you remember what those were?\"(3 points)score3    Language and Praxis  Naming  Show the patient to simple objects, symptoms such as a watch and a pencil, and asked the patient to name them.(2 points)score2    Repetition  \"Repeat the phrase: \"No if's, and's, or but's.\"\"(1point)score1    3 stage command  \"Take the paper in your right hand, folded in half, and put it on the floor.\"  (The examiner gives the patient a " "piece of blank paper)(3points)score3    Reading  \"Please read this and do what it says.\"  (Written instruction is \"close your eyes.\")(1point)score1    Writing  \"Make up and write a sentence about anything.\"  (This sentence must contain a noun and a verb.)(1 point)score1    Copying  \"Please copy this picture\" (the examiner gives the patient a blank is a paper and ask him or her to draw the symbol of 2 intersecting pentagons.  All 10 angles must be present and to must intersect.)(1 point)score1    Total Score 29 out of possible 30    Interpretation :  Cutoff :<24 Abnormal  Range: <21 increased odds of dementia; >25 decreased odds of dementia  Education: <21 abnormal for 8th grade education;<23 abnormal high school education;<24  abnormal for college education  Severity: 24-30 no cognitive impairment;18-23 mild cognitive impairment; 0-17 severe cognitive              impairment    Physical Exam   Constitutional: She is oriented to person, place, and time. She appears well-developed and well-nourished. No distress.   HENT:   Head: Normocephalic and atraumatic.   Right Ear: External ear normal.   Left Ear: External ear normal.   Nose: Nose normal.   Mouth/Throat: Oropharynx is clear and moist.   Eyes: Pupils are equal, round, and reactive to light. Conjunctivae and EOM are normal.   Neck: Normal range of motion. Neck supple. No tracheal deviation present. No thyromegaly present.   Cardiovascular: Normal rate, regular rhythm, normal heart sounds and intact distal pulses. Exam reveals no gallop and no friction rub.   No murmur heard.  Pulmonary/Chest: Effort normal and breath sounds normal. No respiratory distress.   Abdominal: Soft. Bowel sounds are normal. She exhibits no mass. There is no tenderness. There is no guarding.   Musculoskeletal: Normal range of motion. She exhibits no edema.   Right greater than left arthritic changes in the hands and fingers with no crepitus or pain with movement.  Right knee with post " surgical changes and tenderness mostly in the medial aspect.  Unable to fully flex the right knee on exam.  Patient did walk in and out of exam room without can or assistance.   Lymphadenopathy:     She has no cervical adenopathy.   Neurological: She is alert and oriented to person, place, and time. She displays normal reflexes. She exhibits normal muscle tone.   Skin: Skin is warm and dry. Capillary refill takes less than 2 seconds. No rash noted. She is not diaphoretic.   2-3 mm calloused small nonerythematous sacral decubutis ulcer with no drainage.  Right hip lateral with 2x3 cm scab with no drainage or erythema.   Psychiatric: She has a normal mood and affect. Her behavior is normal. Judgment and thought content normal.   Nursing note and vitals reviewed.      Recent Lab Results:  Lab Results   Component Value Date    GLU 99 11/05/2019     Lab Results   Component Value Date    CHOL 160 05/26/2016    TRIG 64 11/05/2019    HDL 49 11/05/2019    VLDL 12.8 11/05/2019    LDLHDL 2.08 05/26/2016     Right knee xray - no acute change or fracture hardware in place.  Right hand xray - no acute fracture with significant osteoarthritic changes.    Assessment/Plan   Age-appropriate Screening Schedule:  Refer to the list below for future screening recommendations based on patient's age, sex and/or medical conditions.      Health Maintenance   Topic Date Due   • ZOSTER VACCINE (2 of 3) 02/26/2012   • TDAP/TD VACCINES (3 - Tdap) 11/09/2029   • INFLUENZA VACCINE  Completed   • MAMMOGRAM  Discontinued   • DXA SCAN  Discontinued       Medicare Risks and Personalized Health Plan:  Advance Directive Discussion  Fall Risk  Glaucoma Risk  Hearing Problem      CMS-Preventive Services Quick Reference  Medicare Preventive Services Addressed:  Annual Wellness Visit (AWV)  Bone Density Measurements  Glaucoma screening (for individuals with diabetes mellitus, family history of glaucoma, -Americans (> or =) age 50, -Americans  (> or =) age 65)    Advance Care Planning:  Patient does not have an advance directive - information provided to the patient today    Diagnoses and all orders for this visit:    1. Medicare annual wellness visit, subsequent (Primary)    2. Right hand pain  -     XR Hand 3+ View Right    3. Acute pain of right knee  -     XR Knee 1 or 2 View Right    4. Dysuria  -     POC Urinalysis Dipstick, Automated  -     Urine Culture - Urine, Urine, Clean Catch    5. Decubitus ulcer of sacral region, stage 2 (CMS/HCC)    6. Frequency of urination  -     Urine Culture - Urine, Urine, Clean Catch    7. Partial thickness burn of buttock, initial encounter    8. Arthralgia of multiple sites    Reviewed history and annual wellness visit with patient during office time.  Medications reviewed as appropriate.  Discussed advanced directives and living will.  Patient has living will: Living will: no and information packet given to patient to complete at home and bring copy to office.  Discussed fall risk and precautions encourage removing throw rugs and using grab bars within the home and bathroom.  No need for labs at this time.  Discussed flu shot recommended to get the high-dose influenza vaccine annually in the fall.  Prevnar-13 and pneumovax-23 up to date and appropriate.  Shingrix vaccination series recommended.  Encourage follow-up with the eye doctor on annual basis for glaucoma evaluation.  Discussed weight and encouraged exercise as tolerated while following a healthy diet.  Colon cancer screening and mammogram not required any more secondary to age and patient does not want.  Patient is to follow up with pain management and wound care as scheduled with the surgeon already.  Discussed the burn on her right lateral buttock region secondary to the heating pad that she has been using currently no sign of infection.  Recommend that she continue to follow-up with the wound care doctor for this and to stop using the heating pad as  this is her third or fourth time burning herself with it.  Discussed her severe osteoarthritis that is chronic and no sign of fracture from the fall.  She is to continue with her meloxicam and symptomatic treatment only.  She is not to use any other anti-inflammatories which includes ibuprofen.  She may use the Tylenol as needed.  Again I recommend no heating pad.  Will await the urine culture.  Discussed home situation at length living with  with dementia.  Patient herself repeats herself frequently throughout the entire exam and history taking however her memory appears to be intact.  No new changes at this time.  Until the culture comes back.  An After Visit Summary and PPPS with all of these plans were given to the patient.      Follow Up:  No follow-ups on file.        Addendum official x-ray reports  Right knee findings: AP and lateral views demonstrate a total knee arthroplasty that appears in satisfactory position.  There is no evidence of loosening or fracture or subluxation.  The patellofemoral joint space appears narrowed.   Right hand findings: 3 views of the right hand demonstrate mildly extensive osteoarthritis involving the DIP and PIP joints.  There is marked narrowing of essentially all of the DIP joints and a moderate narrowing of the PIP joints with marginal bony spurring.  The metacarpophalangeal joints also appear somewhat narrowed.  There is moderate narrowing of the first carpometacarpal joint space.  There is no evidence of recent or old fracture or subluxation.

## 2020-01-30 ENCOUNTER — TELEPHONE (OUTPATIENT)
Dept: FAMILY MEDICINE CLINIC | Facility: CLINIC | Age: 85
End: 2020-01-30

## 2020-01-30 LAB
BACTERIA UR CULT: NO GROWTH
BACTERIA UR CULT: NORMAL

## 2020-01-30 NOTE — TELEPHONE ENCOUNTER
No growth came from the urine culture.  No sign of infection was found.  No new treatment is needed.

## 2020-01-30 NOTE — TELEPHONE ENCOUNTER
Patient called inquiring about her urine culture results? Also, she wanted to let you know to that if you talk to her son Sergey, to watch what you say.  She says he is trying to put them in a nursing home or assisted living facility and she doesn't want to go.  She says she is doing fine. Her phone number is [phone].

## 2020-02-05 ENCOUNTER — TELEPHONE (OUTPATIENT)
Dept: FAMILY MEDICINE CLINIC | Facility: CLINIC | Age: 85
End: 2020-02-05

## 2020-02-05 NOTE — TELEPHONE ENCOUNTER
LOV 1/28 Maninder on desk.     Please advise the patients sugar. I looked in her chart, she's not a diabetic . Should she be taking something for her sugar?

## 2020-02-05 NOTE — TELEPHONE ENCOUNTER
Refill request on her Gabapentin 400 mg Take 2 capsules by mouth 3 (Three) Times a Day.    Patient is also concerned that she is not on any medication for her blood sugar, she doesn't have anything in her chart that shows she should be on anything.

## 2020-02-10 DIAGNOSIS — R20.2 PARESTHESIA OF LOWER EXTREMITY: ICD-10-CM

## 2020-02-10 RX ORDER — GABAPENTIN 400 MG/1
CAPSULE ORAL
Qty: 180 CAPSULE | Refills: 1 | Status: SHIPPED | OUTPATIENT
Start: 2020-02-10 | End: 2020-04-06 | Stop reason: SDUPTHER

## 2020-02-18 ENCOUNTER — OFFICE VISIT (OUTPATIENT)
Dept: FAMILY MEDICINE CLINIC | Facility: CLINIC | Age: 85
End: 2020-02-18

## 2020-02-18 VITALS
WEIGHT: 149 LBS | DIASTOLIC BLOOD PRESSURE: 62 MMHG | HEART RATE: 73 BPM | TEMPERATURE: 97.6 F | SYSTOLIC BLOOD PRESSURE: 118 MMHG | HEIGHT: 66 IN | OXYGEN SATURATION: 96 % | BODY MASS INDEX: 23.95 KG/M2

## 2020-02-18 DIAGNOSIS — N30.00 ACUTE CYSTITIS WITHOUT HEMATURIA: Primary | ICD-10-CM

## 2020-02-18 DIAGNOSIS — R30.0 BURNING WITH URINATION: ICD-10-CM

## 2020-02-18 LAB
BILIRUB BLD-MCNC: NEGATIVE MG/DL
CLARITY, POC: CLEAR
COLOR UR: ABNORMAL
GLUCOSE UR STRIP-MCNC: NEGATIVE MG/DL
KETONES UR QL: NEGATIVE
LEUKOCYTE EST, POC: ABNORMAL
NITRITE UR-MCNC: POSITIVE MG/ML
PH UR: 6 [PH] (ref 5–8)
PROT UR STRIP-MCNC: ABNORMAL MG/DL
RBC # UR STRIP: ABNORMAL /UL
SP GR UR: 1.02 (ref 1–1.03)
UROBILINOGEN UR QL: NORMAL

## 2020-02-18 PROCEDURE — 81003 URINALYSIS AUTO W/O SCOPE: CPT | Performed by: INTERNAL MEDICINE

## 2020-02-18 PROCEDURE — 99213 OFFICE O/P EST LOW 20 MIN: CPT | Performed by: INTERNAL MEDICINE

## 2020-02-18 RX ORDER — CIPROFLOXACIN 500 MG/1
500 TABLET, FILM COATED ORAL 2 TIMES DAILY
Qty: 20 TABLET | Refills: 0 | Status: SHIPPED | OUTPATIENT
Start: 2020-02-18 | End: 2020-03-17

## 2020-02-18 NOTE — PROGRESS NOTES
Subjective   Marce Williamson is a 85 y.o. female.     Chief Complaint   Patient presents with   • burning when urinating   • Constipation   • poor appetite       History of Present Illness   Patient with urinary frequency and burning with constipation and poor appettite.  Denies any fevers chills or other issues related to the urination.  Again complains of pain in the rectum, low back and radiating down the leg.  Saw Tamy Fierro and has planned for surgery in the future but not scheduled at this time.  Has some confusion when she is discussing her medical conditions and repeats herself multiple times however once again when I tried to do screening tests including clock drawing test she passed during the exam and visit.    The following portions of the patient's history were reviewed and updated as appropriate: allergies, current medications, past family history, past medical history, past social history, past surgical history and problem list.    Depression Screen:  PHQ-2/PHQ-9 Depression Screening 1/28/2020   Little interest or pleasure in doing things 0   Feeling down, depressed, or hopeless 0   Trouble falling or staying asleep, or sleeping too much 3   Feeling tired or having little energy 1   Poor appetite or overeating 3   Feeling bad about yourself - or that you are a failure or have let yourself or your family down 0   Trouble concentrating on things, such as reading the newspaper or watching television 0   Moving or speaking so slowly that other people could have noticed. Or the opposite - being so fidgety or restless that you have been moving around a lot more than usual 0   Thoughts that you would be better off dead, or of hurting yourself in some way 0   Total Score 7       Past Medical History:   Diagnosis Date   • Abdominal cramping    • Abdominal pain, left lateral    • Abnormal CT of the abdomen    • Allergic rhinitis    • Anxiety    • Arthralgia of multiple sites    • Arthritis    •  Atrial fibrillation (CMS/HCC)    • Back pain    • BMI 25.0-25.9,adult    • Breast lump    • Calf pain    • Cervical spondylosis    • Constipation    • DDD (degenerative disc disease), lumbar    • Difficulty swallowing    • Diverticulitis of colon    • Dysphagia    • Dysuria    • Easy bruising    • Edema    • Elevated d-dimer    • Episodic tension-type headache, not intractable    • External hemorrhoids    • Fatigue    • Full thickness burn of trunk    • Generalized osteoarthritis of multiple sites    • Headache    • Heart murmur    • Heme positive stool    • Hiatal hernia    • High risk medication use    • History of colonoscopy    • History of echocardiogram 02/22/2019    EF 63% There is fibrocalcific sclerosis of the aortic valve without evidence of aortic stenosis. Mild TR. Trace MR.    • History of leukocytosis    • History of pneumonia     Left lower lobe   • History of rectal bleeding     History of blood per rectum-Abstracted from [facility]   • History of stress test 01/23/2017    Probably normal Lexiscan cardiolite stress. No evidence of significant pharmacologic induced ischemia. No previos infarctions zones. Normal LV systolic function.    • History of transfusion     AFTER HIATAL HERNIA SX   • Hypertension    • Irregular cardiac rhythm    • Irritable bowel syndrome    • Left flank pain    • Left kidney mass    • Left lower quadrant pain    • Localized osteoarthritis of left shoulder    • Low back pain    • Lumbar foraminal stenosis    • Middle ear effusion    • Nausea     Nausea alone-Abstracted from [facility]   • Neural foraminal stenosis of cervical spine    • Nontoxic single thyroid nodule    • Osteopenia    • Other screening mammogram    • Pain in both lower extremities    • Pain in joint of right hip    • Pain in right buttock    • Pain of both hip joints    • Pain of upper extremity    • Polyp of sigmoid colon    • Rectal pain    • Renal cyst, left    • Sciatic pain, right    • Short of breath on  exertion    • Shortness of breath    • Shoulder pain    • Skin lesion    • Swelling of lower extremity    • Throat mass     CYST ON RIGHT SIDE   • Tinnitus, right    • Urinary frequency    • Urinary tract infection    • Vitamin B12 deficiency    • Vitamin C deficiency    • Vulvovaginitis    • Weight loss        Past Surgical History:   Procedure Laterality Date   • ANTERIOR CERVICAL DISCECTOMY W/ FUSION N/A 4/13/2016    Procedure: C-4-C-6 CERVICAL DISCECTOMY ANTERIOR FUSION WITH INSTRUMENTATION;  Surgeon: Blaine Jim DO;  Location: [facility];  Service:    • BACK SURGERY      Dr. Stringer 2001 and Dr. Warren 2002   • CHOLECYSTECTOMY     • COLONOSCOPY      4/6/17 Normal, 3/2013 with diverticuli Dr. Dacosta   • HERNIA REPAIR     • HYSTERECTOMY     • LUMBAR FUSION      L1-L3 fusion   • REPLACEMENT TOTAL KNEE Right    • SHOULDER SURGERY         Family History   Problem Relation Age of Onset   • Heart failure Mother         CONGESTIVE   • Breast cancer Father    • Emphysema Father    • Hypertension Father    • Breast cancer Sister    • Other Sister         POLIO, 1963   • Diabetes Son    • Pancreatic cancer Son    • No Known Problems Other        Social History     Socioeconomic History   • Marital status:      Spouse name: Not on file   • Number of children: Not on file   • Years of education: Not on file   • Highest education level: Not on file   Tobacco Use   • Smoking status: Never Smoker   • Smokeless tobacco: Never Used   Substance and Sexual Activity   • Alcohol use: No   • Drug use: No   • Sexual activity: Defer       Current Outpatient Medications   Medication Sig Dispense Refill   • acebutolol (SECTRAL) 200 MG capsule TAKE 1 CAPSULE EVERY DAY 90 capsule 3   • aspirin 81 MG EC tablet Take 81 mg by mouth Daily.     • dicyclomine (BENTYL) 20 MG tablet dicyclomine 20 mg tablet     • doxycycline (VIBRAMYCIN) 100 MG capsule doxycycline hyclate 100 mg capsule     • gabapentin (NEURONTIN) 400 MG capsule TAKE 2  CAPSULES BY MOUTH THREE TIMES DAILY 180 capsule 1   • HYDROcodone-acetaminophen (NORCO) 7.5-325 MG per tablet 7.5 tablets.  0   • ibuprofen (ADVIL,MOTRIN) 800 MG tablet   0   • lisinopril (PRINIVIL,ZESTRIL) 5 MG tablet Take 1 tablet by mouth Daily. take 1 tablet by mouth once daily 90 tablet 1   • meloxicam (MOBIC) 15 MG tablet Take 1 tablet by mouth Daily. 90 tablet 1   • omeprazole (priLOSEC) 20 MG capsule Take 1 capsule by mouth Daily. 90 capsule 3   • polyethylene glycol (MIRALAX) powder powder Take 17 g by mouth Daily. 500 g 1   • propafenone (RYTHMOL) 150 MG tablet Take 1 tablet by mouth Every 12 (Twelve) Hours. 60 tablet 5   • rOPINIRole (REQUIP) 3 MG tablet 3 mg.     • ciprofloxacin (CIPRO) 500 MG tablet Take 1 tablet by mouth 2 (Two) Times a Day. 20 tablet 0     No current facility-administered medications for this visit.        Review of Systems   Constitutional: Negative for activity change, appetite change, fatigue, fever, unexpected weight gain and unexpected weight loss.   HENT: Negative for nosebleeds, rhinorrhea, trouble swallowing and voice change.    Eyes: Negative for visual disturbance.   Respiratory: Negative for cough, chest tightness, shortness of breath and wheezing.    Cardiovascular: Negative for chest pain, palpitations and leg swelling.   Gastrointestinal: Negative for abdominal pain, blood in stool, constipation, diarrhea, nausea, vomiting, GERD and indigestion.   Genitourinary: Positive for dysuria, frequency and urinary incontinence. Negative for hematuria.   Musculoskeletal: Positive for arthralgias and back pain. Negative for myalgias.   Skin: Negative for rash and bruise.   Neurological: Negative for dizziness, tremors, weakness, light-headedness, numbness, headache and memory problem.   Hematological: Negative for adenopathy. Does not bruise/bleed easily.   Psychiatric/Behavioral: Negative for sleep disturbance and depressed mood. The patient is not nervous/anxious.   "      Objective   /62 (BP Location: Left arm, Patient Position: Sitting, Cuff Size: Large Adult)   Pulse 73   Temp 97.6 °F (36.4 °C) (Temporal)   Ht 167.6 cm (65.98\")   Wt 67.6 kg (149 lb)   SpO2 96%   BMI 24.06 kg/m²     Physical Exam   Constitutional: She is oriented to person, place, and time. She appears well-developed and well-nourished. No distress.   HENT:   Head: Normocephalic and atraumatic.   Right Ear: External ear normal.   Left Ear: External ear normal.   Nose: Nose normal.   Mouth/Throat: Oropharynx is clear and moist.   Eyes: Pupils are equal, round, and reactive to light. Conjunctivae and EOM are normal.   Neck: Normal range of motion. Neck supple. No tracheal deviation present. No thyromegaly present.   Cardiovascular: Normal rate, regular rhythm, normal heart sounds and intact distal pulses. Exam reveals no gallop and no friction rub.   No murmur heard.  Pulmonary/Chest: Effort normal and breath sounds normal. No respiratory distress.   Abdominal: Soft. Bowel sounds are normal. She exhibits no mass. There is no tenderness. There is no guarding.   Musculoskeletal: Normal range of motion. She exhibits no edema.   Using one point cane.   and constantly moving in the office.  Right greater than left arthritic changes in the hands and fingers with no crepitus or pain with movement.  Right knee with post surgical changes and tenderness mostly in the medial aspect.  Unable to fully flex the right knee on exam.  Patient did walk in and out of exam room without can or assistance.    Lymphadenopathy:     She has no cervical adenopathy.   Neurological: She is alert and oriented to person, place, and time. She displays normal reflexes. She exhibits normal muscle tone.   Skin: Skin is warm and dry. Capillary refill takes less than 2 seconds. She is not diaphoretic.   Psychiatric: She has a normal mood and affect. Her behavior is normal. Judgment and thought content normal.   Nursing note " and vitals reviewed.      Recent Results (from the past 2016 hour(s))   POCT urinalysis dipstick, automated    Collection Time: 12/17/19  3:35 PM   Result Value Ref Range    Color Dark Yellow Yellow, Straw, Dark Yellow, Nilam    Clarity, UA Cloudy (A) Clear    Specific Gravity  1.010 1.005 - 1.030    pH, Urine 6.0 5.0 - 8.0    Leukocytes Large (3+) (A) Negative    Nitrite, UA Negative Negative    Protein, POC Negative Negative mg/dL    Glucose, UA Negative Negative, 1000 mg/dL (3+) mg/dL    Ketones, UA Negative Negative    Urobilinogen, UA Normal Normal    Bilirubin Negative Negative    Blood, UA Trace (A) Negative   Urine Culture - Urine, Urine, Clean Catch    Collection Time: 12/17/19  4:29 PM   Result Value Ref Range    Urine Culture Final report     Result 1 No growth    POC Urinalysis Dipstick, Automated    Collection Time: 01/28/20  4:59 PM   Result Value Ref Range    Color Nilam Yellow, Straw, Dark Yellow, Nilam    Clarity, UA Clear Clear    Specific Gravity  1.015 1.005 - 1.030    pH, Urine 6.5 5.0 - 8.0    Leukocytes Small (1+) (A) Negative    Nitrite, UA Negative Negative    Protein, POC Negative Negative mg/dL    Glucose, UA Negative Negative, 1000 mg/dL (3+) mg/dL    Ketones, UA Negative Negative    Urobilinogen, UA Normal Normal    Bilirubin Negative Negative    Blood, UA Negative Negative   Urine Culture - Urine, Urine, Clean Catch    Collection Time: 01/28/20  5:25 PM   Result Value Ref Range    Urine Culture Final report     Result 1 No growth    POCT urinalysis dipstick, automated    Collection Time: 02/18/20  3:07 PM   Result Value Ref Range    Color Dark Yellow Yellow, Straw, Dark Yellow, Nilam    Clarity, UA Clear Clear    Specific Gravity  1.020 1.005 - 1.030    pH, Urine 6.0 5.0 - 8.0    Leukocytes Moderate (2+) (A) Negative    Nitrite, UA Positive (A) Negative    Protein, POC Trace (A) Negative mg/dL    Glucose, UA Negative Negative, 1000 mg/dL (3+) mg/dL    Ketones, UA Negative Negative     Urobilinogen, UA Normal Normal    Bilirubin Negative Negative    Blood, UA Trace (A) Negative     Assessment/Plan   Marce was seen today for burning when urinating, constipation and poor appetite.    Diagnoses and all orders for this visit:    Acute cystitis without hematuria  -     Urine Culture - Urine, Urine, Clean Catch  -     ciprofloxacin (CIPRO) 500 MG tablet; Take 1 tablet by mouth 2 (Two) Times a Day.    Burning with urination  -     POCT urinalysis dipstick, automated  -     ciprofloxacin (CIPRO) 500 MG tablet; Take 1 tablet by mouth 2 (Two) Times a Day.    Patient with an apparent positive urinalysis and will initiate treatment with ciprofloxacin twice a day and prescription sent to the pharmacy and informed both patient and her son.  Discussed with patient I recommended she follow-up with the surgeon and with all the other doctors including the pain management.  There is not much that I can do for her chronic pains in the rectal area and the low back.  After finished with patient son was brought into room to discuss.  Concerns with both patient and her  and her at their home care the environment and her memory even though her test do not show any positive test clinically and during the exam she definitely has some memory problems.  I recommended that they should try and consider long-term care facility.  However, the son states Marce refuses any placement or record change from her home.  He does not have any power of .  He is going to have an elder law care specialist, and discussed with him and Ms. esther George in the future.

## 2020-02-20 ENCOUNTER — TELEPHONE (OUTPATIENT)
Dept: FAMILY MEDICINE CLINIC | Facility: CLINIC | Age: 85
End: 2020-02-20

## 2020-02-20 NOTE — TELEPHONE ENCOUNTER
Pt ask that you return her call. Pt says that she is having severe leg pain and wants to be referred to physical therapy.   Please note pt sated this is a urgent matter.

## 2020-02-20 NOTE — TELEPHONE ENCOUNTER
Patient call back and said she is ready to go to assisted living and wants to know if Dr. Khalil can help out

## 2020-02-21 NOTE — TELEPHONE ENCOUNTER
Called patient back surprisingly she is not referring to any kind of severe leg pain now.  However she is wanting to go into assisted living and she is actually willing to do that with her .  I discussed with her that she needs to talk to her son and call a few different assisted-living facilities and visit them and discussed with him the costs placement etc. and then she can make her decision.

## 2020-02-22 LAB
BACTERIA UR CULT: ABNORMAL
BACTERIA UR CULT: ABNORMAL
OTHER ANTIBIOTIC SUSC ISLT: ABNORMAL

## 2020-03-09 ENCOUNTER — TELEPHONE (OUTPATIENT)
Dept: NEUROLOGY | Facility: CLINIC | Age: 85
End: 2020-03-09

## 2020-03-09 NOTE — TELEPHONE ENCOUNTER
DR.SMITH MARCIE LAND  [phone]    Patient says that she needed to be seen sooner than the appointment that she had scheduled. She is having servere pain from her back on down to her hips and also a burning sensation. I advised patient that the previous appt that she had was rescheduled and advised her to first contact 911 since the pain was unbearable. Patient said that she had fell several weeks ago and had not seen anyone. She could not remember when it was that she fell that it was when her  went into the hospital.

## 2020-03-09 NOTE — TELEPHONE ENCOUNTER
Made an attempt to call patient the phone continued to ring until receiving the dial tone could not leave a message.

## 2020-03-12 DIAGNOSIS — N30.00 ACUTE CYSTITIS WITHOUT HEMATURIA: ICD-10-CM

## 2020-03-12 DIAGNOSIS — R30.0 BURNING WITH URINATION: ICD-10-CM

## 2020-03-13 NOTE — TELEPHONE ENCOUNTER
Spoke with patient and she stated that as soon as she stopped the medication the burning came back.  She feels like this is the only thing that helps her.  She had a urine cx done on 2/18/2020

## 2020-03-16 ENCOUNTER — TELEPHONE (OUTPATIENT)
Dept: FAMILY MEDICINE CLINIC | Facility: CLINIC | Age: 85
End: 2020-03-16

## 2020-03-16 NOTE — TELEPHONE ENCOUNTER
Pt is requesting a refill on the following medication,   ciprofloxacin (CIPRO) 500 MG tablet        Sig: Take 1 tablet by mouth 2 (Two) Times a Day.

## 2020-03-17 ENCOUNTER — OFFICE VISIT (OUTPATIENT)
Dept: FAMILY MEDICINE CLINIC | Facility: CLINIC | Age: 85
End: 2020-03-17

## 2020-03-17 VITALS
HEART RATE: 68 BPM | OXYGEN SATURATION: 98 % | DIASTOLIC BLOOD PRESSURE: 62 MMHG | WEIGHT: 155 LBS | TEMPERATURE: 97.7 F | BODY MASS INDEX: 24.91 KG/M2 | SYSTOLIC BLOOD PRESSURE: 138 MMHG | HEIGHT: 66 IN

## 2020-03-17 DIAGNOSIS — M51.36 DEGENERATION OF INTERVERTEBRAL DISC OF LUMBAR REGION: ICD-10-CM

## 2020-03-17 DIAGNOSIS — F41.9 ANXIETY: ICD-10-CM

## 2020-03-17 DIAGNOSIS — R30.0 BURNING WITH URINATION: Primary | ICD-10-CM

## 2020-03-17 DIAGNOSIS — K59.09 CHRONIC CONSTIPATION: ICD-10-CM

## 2020-03-17 DIAGNOSIS — G62.9 NEUROPATHY: ICD-10-CM

## 2020-03-17 LAB
BILIRUB BLD-MCNC: NEGATIVE MG/DL
CLARITY, POC: CLEAR
COLOR UR: YELLOW
GLUCOSE UR STRIP-MCNC: NEGATIVE MG/DL
KETONES UR QL: NEGATIVE
LEUKOCYTE EST, POC: NEGATIVE
NITRITE UR-MCNC: NEGATIVE MG/ML
PH UR: 7 [PH] (ref 5–8)
PROT UR STRIP-MCNC: NEGATIVE MG/DL
RBC # UR STRIP: NEGATIVE /UL
SP GR UR: 1.02 (ref 1–1.03)
UROBILINOGEN UR QL: NORMAL

## 2020-03-17 PROCEDURE — 99214 OFFICE O/P EST MOD 30 MIN: CPT | Performed by: INTERNAL MEDICINE

## 2020-03-17 PROCEDURE — 81003 URINALYSIS AUTO W/O SCOPE: CPT | Performed by: INTERNAL MEDICINE

## 2020-03-17 RX ORDER — LUBIPROSTONE 8 UG/1
8 CAPSULE ORAL 2 TIMES DAILY WITH MEALS
Qty: 60 CAPSULE | Refills: 3 | Status: SHIPPED | OUTPATIENT
Start: 2020-03-17 | End: 2021-01-07 | Stop reason: SDUPTHER

## 2020-03-17 RX ORDER — DULOXETIN HYDROCHLORIDE 30 MG/1
30 CAPSULE, DELAYED RELEASE ORAL DAILY
Qty: 30 CAPSULE | Refills: 3 | Status: SHIPPED | OUTPATIENT
Start: 2020-03-17 | End: 2020-07-01

## 2020-03-17 RX ORDER — CIPROFLOXACIN 500 MG/1
TABLET, FILM COATED ORAL
Qty: 20 TABLET | Refills: 0 | Status: SHIPPED | OUTPATIENT
Start: 2020-03-17 | End: 2020-07-01

## 2020-03-17 NOTE — TELEPHONE ENCOUNTER
Can you forward this to Dr Khalil?  I have never seen this pt and was covering for him last week but he is back now.

## 2020-03-17 NOTE — PROGRESS NOTES
Subjective   Marce Williamson is a 85 y.o. female.     Chief Complaint   Patient presents with   • Urinary Tract Infection       History of Present Illness     Patient was seen in [facility] 3/10/2020 with UTI.  No additional treatment given.  Patient complains of burning in the vagina, rectum and legs.  Complains of constipation and using laxatives.  Has used the miralax with no relief.  The pains have been chronic.      The following portions of the patient's history were reviewed and updated as appropriate: allergies, current medications, past family history, past medical history, past social history, past surgical history and problem list.    Depression Screen:  PHQ-2/PHQ-9 Depression Screening 1/28/2020   Little interest or pleasure in doing things 0   Feeling down, depressed, or hopeless 0   Trouble falling or staying asleep, or sleeping too much 3   Feeling tired or having little energy 1   Poor appetite or overeating 3   Feeling bad about yourself - or that you are a failure or have let yourself or your family down 0   Trouble concentrating on things, such as reading the newspaper or watching television 0   Moving or speaking so slowly that other people could have noticed. Or the opposite - being so fidgety or restless that you have been moving around a lot more than usual 0   Thoughts that you would be better off dead, or of hurting yourself in some way 0   Total Score 7       Past Medical History:   Diagnosis Date   • Abdominal cramping    • Abdominal pain, left lateral    • Abnormal CT of the abdomen    • Allergic rhinitis    • Anxiety    • Arthralgia of multiple sites    • Arthritis    • Atrial fibrillation (CMS/HCC)    • Back pain    • BMI 25.0-25.9,adult    • Breast lump    • Calf pain    • Cervical spondylosis    • Constipation    • DDD (degenerative disc disease), lumbar    • Difficulty swallowing    • Diverticulitis of colon    • Dysphagia    • Dysuria    • Easy bruising    • Edema    • Elevated  d-dimer    • Episodic tension-type headache, not intractable    • External hemorrhoids    • Fatigue    • Full thickness burn of trunk    • Generalized osteoarthritis of multiple sites    • Headache    • Heart murmur    • Heme positive stool    • Hiatal hernia    • High risk medication use    • History of colonoscopy    • History of echocardiogram 02/22/2019    EF 63% There is fibrocalcific sclerosis of the aortic valve without evidence of aortic stenosis. Mild TR. Trace MR.    • History of leukocytosis    • History of pneumonia     Left lower lobe   • History of rectal bleeding     History of blood per rectum-Abstracted from [facility]   • History of stress test 01/23/2017    Probably normal Lexiscan cardiolite stress. No evidence of significant pharmacologic induced ischemia. No previos infarctions zones. Normal LV systolic function.    • History of transfusion     AFTER HIATAL HERNIA SX   • Hypertension    • Irregular cardiac rhythm    • Irritable bowel syndrome    • Left flank pain    • Left kidney mass    • Left lower quadrant pain    • Localized osteoarthritis of left shoulder    • Low back pain    • Lumbar foraminal stenosis    • Middle ear effusion    • Nausea     Nausea alone-Abstracted from [facility]   • Neural foraminal stenosis of cervical spine    • Nontoxic single thyroid nodule    • Osteopenia    • Other screening mammogram    • Pain in both lower extremities    • Pain in joint of right hip    • Pain in right buttock    • Pain of both hip joints    • Pain of upper extremity    • Polyp of sigmoid colon    • Rectal pain    • Renal cyst, left    • Sciatic pain, right    • Short of breath on exertion    • Shortness of breath    • Shoulder pain    • Skin lesion    • Swelling of lower extremity    • Throat mass     CYST ON RIGHT SIDE   • Tinnitus, right    • Urinary frequency    • Urinary tract infection    • Vitamin B12 deficiency    • Vitamin C deficiency    • Vulvovaginitis    • Weight loss        Past  Surgical History:   Procedure Laterality Date   • ANTERIOR CERVICAL DISCECTOMY W/ FUSION N/A 4/13/2016    Procedure: C-4-C-6 CERVICAL DISCECTOMY ANTERIOR FUSION WITH INSTRUMENTATION;  Surgeon: Blaine Jim DO;  Location: [facility] OR;  Service:    • BACK SURGERY      Dr. Stringer 2001 and Dr. Warren 2002   • CHOLECYSTECTOMY     • COLONOSCOPY      4/6/17 Normal, 3/2013 with diverticuli Dr. Dacosta   • HERNIA REPAIR     • HYSTERECTOMY     • LUMBAR FUSION      L1-L3 fusion   • REPLACEMENT TOTAL KNEE Right    • SHOULDER SURGERY         Family History   Problem Relation Age of Onset   • Heart failure Mother         CONGESTIVE   • Breast cancer Father    • Emphysema Father    • Hypertension Father    • Breast cancer Sister    • Other Sister         POLIO, 1963   • Diabetes Son    • Pancreatic cancer Son    • No Known Problems Other        Social History     Socioeconomic History   • Marital status:      Spouse name: Not on file   • Number of children: Not on file   • Years of education: Not on file   • Highest education level: Not on file   Tobacco Use   • Smoking status: Never Smoker   • Smokeless tobacco: Never Used   Substance and Sexual Activity   • Alcohol use: No   • Drug use: No   • Sexual activity: Defer       Current Outpatient Medications   Medication Sig Dispense Refill   • acebutolol (SECTRAL) 200 MG capsule TAKE 1 CAPSULE EVERY DAY 90 capsule 3   • aspirin 81 MG EC tablet Take 81 mg by mouth Daily.     • ciprofloxacin (CIPRO) 500 MG tablet Take 1 tablet by mouth 2 (Two) Times a Day. 20 tablet 0   • dicyclomine (BENTYL) 20 MG tablet dicyclomine 20 mg tablet     • doxycycline (VIBRAMYCIN) 100 MG capsule doxycycline hyclate 100 mg capsule     • gabapentin (NEURONTIN) 400 MG capsule TAKE 2 CAPSULES BY MOUTH THREE TIMES DAILY 180 capsule 1   • HYDROcodone-acetaminophen (NORCO) 7.5-325 MG per tablet 7.5 tablets.  0   • ibuprofen (ADVIL,MOTRIN) 800 MG tablet   0   • lisinopril (PRINIVIL,ZESTRIL) 5 MG tablet Take 1  "tablet by mouth Daily. take 1 tablet by mouth once daily 90 tablet 1   • meloxicam (MOBIC) 15 MG tablet Take 1 tablet by mouth Daily. 90 tablet 1   • omeprazole (priLOSEC) 20 MG capsule Take 1 capsule by mouth Daily. 90 capsule 3   • polyethylene glycol (MIRALAX) powder powder Take 17 g by mouth Daily. 500 g 1   • propafenone (RYTHMOL) 150 MG tablet Take 1 tablet by mouth Every 12 (Twelve) Hours. 60 tablet 5   • rOPINIRole (REQUIP) 3 MG tablet 3 mg.     • DULoxetine (CYMBALTA) 30 MG capsule Take 1 capsule by mouth Daily. 30 capsule 3   • lubiprostone (AMITIZA) 8 MCG capsule Take 1 capsule by mouth 2 (Two) Times a Day With Meals. 60 capsule 3     No current facility-administered medications for this visit.        Review of Systems   Constitutional: Negative for activity change, appetite change, fatigue, fever, unexpected weight gain and unexpected weight loss.   HENT: Negative for nosebleeds, rhinorrhea, trouble swallowing and voice change.    Eyes: Negative for visual disturbance.   Respiratory: Negative for cough, chest tightness, shortness of breath and wheezing.    Cardiovascular: Negative for chest pain, palpitations and leg swelling.   Gastrointestinal: Negative for abdominal pain, blood in stool, constipation, diarrhea, nausea, vomiting, GERD and indigestion.   Genitourinary: Negative for dysuria, frequency and hematuria.   Musculoskeletal: Positive for arthralgias and back pain. Negative for myalgias.   Skin: Negative for rash and bruise.   Neurological: Negative for dizziness, tremors, weakness, light-headedness, numbness, headache and memory problem.   Hematological: Negative for adenopathy. Does not bruise/bleed easily.   Psychiatric/Behavioral: Negative for sleep disturbance and depressed mood. The patient is not nervous/anxious.        Objective   /62 (BP Location: Right arm, Patient Position: Sitting, Cuff Size: Adult)   Pulse 68   Temp 97.7 °F (36.5 °C) (Temporal)   Ht 167.6 cm (65.98\")   Wt " 70.3 kg (155 lb)   SpO2 98%   BMI 25.03 kg/m²     Physical Exam   Constitutional: She is oriented to person, place, and time. She appears well-developed and well-nourished. No distress.   HENT:   Head: Normocephalic and atraumatic.   Right Ear: External ear normal.   Left Ear: External ear normal.   Nose: Nose normal.   Mouth/Throat: Oropharynx is clear and moist.   Eyes: Pupils are equal, round, and reactive to light. Conjunctivae and EOM are normal.   Neck: Normal range of motion. Neck supple. No tracheal deviation present. No thyromegaly present.   Cardiovascular: Normal rate, regular rhythm, normal heart sounds and intact distal pulses. Exam reveals no gallop and no friction rub.   No murmur heard.  Pulmonary/Chest: Effort normal and breath sounds normal. No respiratory distress.   Abdominal: Soft. Bowel sounds are normal. She exhibits no mass. There is no tenderness. There is no guarding.   Musculoskeletal: Normal range of motion. She exhibits no edema.   Using one point cane.   and constantly moving in the office.  Right greater than left arthritic changes in the hands and fingers with no crepitus or pain with movement.  Right knee with post surgical changes and tenderness mostly in the medial aspect.  Unable to fully flex the right knee on exam.  Patient did walk in and out of exam room without can or assistance.     Lymphadenopathy:     She has no cervical adenopathy.   Neurological: She is alert and oriented to person, place, and time. She displays normal reflexes. She exhibits normal muscle tone.   Skin: Skin is warm and dry. Capillary refill takes less than 2 seconds. No rash noted. She is not diaphoretic.   Psychiatric: She has a normal mood and affect. Her behavior is normal. Judgment and thought content normal.   Nursing note and vitals reviewed.      Recent Results (from the past 2016 hour(s))   POC Urinalysis Dipstick, Automated    Collection Time: 01/28/20  4:59 PM   Result Value Ref  Range    Color Nilam Yellow, Straw, Dark Yellow, Nilam    Clarity, UA Clear Clear    Specific Gravity  1.015 1.005 - 1.030    pH, Urine 6.5 5.0 - 8.0    Leukocytes Small (1+) (A) Negative    Nitrite, UA Negative Negative    Protein, POC Negative Negative mg/dL    Glucose, UA Negative Negative, 1000 mg/dL (3+) mg/dL    Ketones, UA Negative Negative    Urobilinogen, UA Normal Normal    Bilirubin Negative Negative    Blood, UA Negative Negative   Urine Culture - Urine, Urine, Clean Catch    Collection Time: 01/28/20  5:25 PM   Result Value Ref Range    Urine Culture Final report     Result 1 No growth    Urine Culture - Urine, Urine, Clean Catch    Collection Time: 02/18/20  3:05 PM   Result Value Ref Range    Urine Culture Final report (A)     Result 1 Escherichia coli (A)     Susceptibility Testing Comment    POCT urinalysis dipstick, automated    Collection Time: 02/18/20  3:07 PM   Result Value Ref Range    Color Dark Yellow Yellow, Straw, Dark Yellow, Nilam    Clarity, UA Clear Clear    Specific Gravity  1.020 1.005 - 1.030    pH, Urine 6.0 5.0 - 8.0    Leukocytes Moderate (2+) (A) Negative    Nitrite, UA Positive (A) Negative    Protein, POC Trace (A) Negative mg/dL    Glucose, UA Negative Negative, 1000 mg/dL (3+) mg/dL    Ketones, UA Negative Negative    Urobilinogen, UA Normal Normal    Bilirubin Negative Negative    Blood, UA Trace (A) Negative   CBC AND DIFFERENTIAL    Collection Time: 03/10/20  4:19 PM   Result Value Ref Range    WBC 5.53 4.5 - 11.0 10*3/uL    RBC 3.93 (L) 4.0 - 5.2 10*6/uL    Hemoglobin 11.7 (L) 12.0 - 16.0 g/dL    Hematocrit 36.0 36.0 - 46.0 %    MCV 91.6 80.0 - 100.0 fL    MCH 29.8 26.0 - 34.0 pg    MCHC 32.5 31.0 - 37.0 g/dL    RDW 13.3 12.0 - 16.8 %    Platelets 141 140 - 440 10*3/uL    MPV 12.0 (H) 6.7 - 10.8 fL    Differential Type Hospital CBC w/AutoDiff (arb'U)    Neutrophil Rel % 57.1 45 - 80 %    Lymphocyte Rel % 28.2 15 - 50 %    Monocyte Rel % 11.0 0 - 15 %    Eosinophil % 2.9  0 - 7 %    Basophil Rel % 0.4 0 - 2 %    Immature Grans % 0.4 (H) 0 %    nRBC 0 0 /100(WBC)    Neutrophils Absolute 3.16 2.0 - 8.8 10*3/uL    Lymphocytes Absolute 1.56 0.7 - 5.5 10*3/uL    Monocytes Absolute 0.61 0.0 - 1.7 10*3/uL    Eosinophils Absolute 0.16 0.0 - 0.8 10*3/uL    Basophils Absolute 0.02 0.0 - 0.2 10*3/uL    Immature Grans, Absolute 0.02 <1 10*3/uL   BASIC METABOLIC PANEL    Collection Time: 03/10/20  4:19 PM   Result Value Ref Range    Sodium 136 (L) 137 - 145 mmol/L    Potassium 4.0 3.5 - 5.1 mmol/L    Chloride 99 98 - 107 mmol/L    Total CO2 28 22 - 30 mmol/L    Glucose 97 74 - 99 mg/dL    BUN 12 7 - 20 mg/dL    Creatinine 0.6 (L) 0.7 - 1.5 mg/dL    BUN/Creatinine Ratio 20.0 RATIO    Est GFR by Clearance >60 >60 /1.73 m2    Calcium 8.8 8.4 - 10.2 mg/dL   LACTIC ACID, PLASMA    Collection Time: 03/10/20  4:19 PM   Result Value Ref Range    Lactate 0.5 (L) 0.7 - 2.0 mmol/L   POCT urinalysis dipstick, automated    Collection Time: 03/17/20 10:37 AM   Result Value Ref Range    Color Yellow Yellow, Straw, Dark Yellow, Nilam    Clarity, UA Clear Clear    Specific Gravity  1.020 1.005 - 1.030    pH, Urine 7.0 5.0 - 8.0    Leukocytes Negative Negative    Nitrite, UA Negative Negative    Protein, POC Negative Negative mg/dL    Glucose, UA Negative Negative, 1000 mg/dL (3+) mg/dL    Ketones, UA Negative Negative    Urobilinogen, UA Normal Normal    Bilirubin Negative Negative    Blood, UA Negative Negative     Assessment/Plan   Marce was seen today for urinary tract infection.    Diagnoses and all orders for this visit:    Burning with urination  -     POCT urinalysis dipstick, automated    Chronic constipation  -     lubiprostone (AMITIZA) 8 MCG capsule; Take 1 capsule by mouth 2 (Two) Times a Day With Meals.    Degeneration of intervertebral disc of lumbar region  -     DULoxetine (CYMBALTA) 30 MG capsule; Take 1 capsule by mouth Daily.    Neuropathy  -     DULoxetine (CYMBALTA) 30 MG capsule; Take 1  capsule by mouth Daily.    Anxiety  -     DULoxetine (CYMBALTA) 30 MG capsule; Take 1 capsule by mouth Daily.      I discussed with patient the rectal, leg and vaginal pain is related to the back and there is nothing I can do at this time.  Will try amitiza for the constipation that is likely due to the back and nerve issues and pain medications.  Will continue the current medications other wise.  No treatment for UTI is needed at this time.  Discussed the anxiety and will try the addition of duloxetine 30 mg daily.  Patient needs the mammogram rescheduled.  She never went to have it done in October.  Discussed with patient that she needs to follow up with the cardiologist Dr Herman.  Patient is dependent on son for transportation now and  is in rehab/Nursing home.

## 2020-03-19 DIAGNOSIS — Z12.31 SCREENING MAMMOGRAM, ENCOUNTER FOR: Primary | ICD-10-CM

## 2020-03-25 ENCOUNTER — TELEPHONE (OUTPATIENT)
Dept: FAMILY MEDICINE CLINIC | Facility: CLINIC | Age: 85
End: 2020-03-25

## 2020-03-25 DIAGNOSIS — I10 ESSENTIAL HYPERTENSION: Primary | ICD-10-CM

## 2020-03-25 DIAGNOSIS — F39 MOOD DISORDER (HCC): ICD-10-CM

## 2020-03-25 DIAGNOSIS — F41.9 ANXIETY: ICD-10-CM

## 2020-03-25 NOTE — TELEPHONE ENCOUNTER
Discussed with son at length concerning his mother.  He is having difficulties with her she is accusing him of taking her house the frequently calling making up stories about things calling him at 3:00 in the morning etc.  This all while his father is in the nursing home/rehab in [place] and unable to be transferred to [facility] like she wants because of the current coded 19 pandemic.  I discussed with him she may have some underlying issues but I am unable to treat her as we are not even sure she is taking the medication that I just recently given her.  After discussion I agreed that we will try and send home health out to see her to evaluate her her medications and possibly have psychiatric nursing evaluate as well.  The patient son Louie was agreeable to this course of action.

## 2020-03-25 NOTE — TELEPHONE ENCOUNTER
Please call Louie (son) regarding patient. Says she is getting worse and calling people all thru the night. Accusing him of theft etc..phone [phone]

## 2020-03-26 ENCOUNTER — TELEPHONE (OUTPATIENT)
Dept: FAMILY MEDICINE CLINIC | Facility: CLINIC | Age: 85
End: 2020-03-26

## 2020-03-30 ENCOUNTER — TELEPHONE (OUTPATIENT)
Dept: FAMILY MEDICINE CLINIC | Facility: CLINIC | Age: 85
End: 2020-03-30

## 2020-04-02 ENCOUNTER — TELEPHONE (OUTPATIENT)
Dept: FAMILY MEDICINE CLINIC | Facility: CLINIC | Age: 85
End: 2020-04-02

## 2020-04-06 ENCOUNTER — TELEPHONE (OUTPATIENT)
Dept: FAMILY MEDICINE CLINIC | Facility: CLINIC | Age: 85
End: 2020-04-06

## 2020-04-06 DIAGNOSIS — R20.2 PARESTHESIA OF LOWER EXTREMITY: ICD-10-CM

## 2020-04-06 RX ORDER — MELOXICAM 15 MG/1
15 TABLET ORAL DAILY
Qty: 90 TABLET | Refills: 1 | Status: SHIPPED | OUTPATIENT
Start: 2020-04-06 | End: 2020-10-07 | Stop reason: SDUPTHER

## 2020-04-06 RX ORDER — MELOXICAM 15 MG/1
15 TABLET ORAL DAILY
Qty: 90 TABLET | Refills: 1 | Status: CANCELLED | OUTPATIENT
Start: 2020-04-06

## 2020-04-06 RX ORDER — ACEBUTOLOL HYDROCHLORIDE 200 MG/1
200 CAPSULE ORAL DAILY
Qty: 90 CAPSULE | Refills: 3 | OUTPATIENT
Start: 2020-04-06

## 2020-04-06 RX ORDER — GABAPENTIN 400 MG/1
800 CAPSULE ORAL 3 TIMES DAILY
Qty: 180 CAPSULE | Refills: 1 | Status: SHIPPED | OUTPATIENT
Start: 2020-04-06 | End: 2020-06-04 | Stop reason: SDUPTHER

## 2020-04-06 RX ORDER — GABAPENTIN 400 MG/1
800 CAPSULE ORAL 3 TIMES DAILY
Qty: 180 CAPSULE | Refills: 1 | Status: CANCELLED | OUTPATIENT
Start: 2020-04-06

## 2020-04-06 NOTE — TELEPHONE ENCOUNTER
Patient called for a refill on the following medications.  The acebutolol does not look like it has been filled in a while.  Please send to local pharmacy at patients request

## 2020-04-06 NOTE — TELEPHONE ENCOUNTER
"Spoke with pt, advised that she needed to call her Cardiologist for the acebutolol.  She asked why and I advised her that Dr. Khalil has never filled this medicine.  I am not sure she understand. She stated twice that \"they would fill it at [facility] then\".    "

## 2020-04-08 ENCOUNTER — TELEPHONE (OUTPATIENT)
Dept: NEUROLOGY | Facility: CLINIC | Age: 85
End: 2020-04-08

## 2020-04-08 NOTE — TELEPHONE ENCOUNTER
PT CALLED TO GET A SOONER APPT.   PT IS TAKING CARE OF HER , SO SHE CX HER APPT IN MARCH. SHE IS IN A LOT PAIN, SHE SAYS SHE IS IN SO MUCH PAIN SHE CAN BARLEY WALK.      PLS ADVISE [phone]

## 2020-04-09 NOTE — TELEPHONE ENCOUNTER
I reviewed what records were available.  The consult is for neuropathy.  Apparently several years ago she had an L5/1 discectomy with metallic disc implant.  In January she had an MRI of the pelvis which some of the images show this disc space with some hypertrophic changes which appear to mildly efface the S1 roots.  She also had an ANKIT demonstrating incompressible vessels with low toe pressures bilaterally around 70 consistent with mild ischemia.  Hemoglobin A1c was 6.2%.  She also had an anterior cervical discectomy with fusion by Dr. Jim at C4-5-6.    She has potentially 3 different pain sources in her legs which I assume is what she is describing to be her main pain.  I do think I can figure it out without examining her and doing an EMG.  Note she is 85 years old which is an age group at higher risk for complications from COVID-19 should she get it.    Despite the issues I think I should see her in the office in a double slot for a consult and an EMG    GNS

## 2020-04-16 NOTE — TELEPHONE ENCOUNTER
Can you please reach out to pt and try to get them on Dr. George's schedule at a later date for a consult and EMG per Dr. George's response below?    Thank you.

## 2020-04-20 ENCOUNTER — TELEPHONE (OUTPATIENT)
Dept: FAMILY MEDICINE CLINIC | Facility: CLINIC | Age: 85
End: 2020-04-20

## 2020-04-20 NOTE — TELEPHONE ENCOUNTER
Pt called and stated she is still having burning and frequent urination. Pt would like something sent to     [id number] - [place], [place] - [address] AT [facility] OF [facility] - [phone] [facility] [phone] [place]     Pt call back   [phone]

## 2020-04-22 NOTE — TELEPHONE ENCOUNTER
Patient states her phone was not working and now with new phone and is very quiet and hard to hear it.  Was having some urinary issues and took some kind of medication at home and symptoms are now resolved.  No new issues but has questions about her  Mac.  Documented in his chart.

## 2020-05-04 ENCOUNTER — TELEPHONE (OUTPATIENT)
Dept: FAMILY MEDICINE CLINIC | Facility: CLINIC | Age: 85
End: 2020-05-04

## 2020-05-04 NOTE — TELEPHONE ENCOUNTER
Please call. Says someone from the court has taken over care for Ed. He is currently at [facility]

## 2020-05-11 ENCOUNTER — TELEPHONE (OUTPATIENT)
Dept: FAMILY MEDICINE CLINIC | Facility: CLINIC | Age: 85
End: 2020-05-11

## 2020-05-14 ENCOUNTER — OFFICE VISIT (OUTPATIENT)
Dept: FAMILY MEDICINE CLINIC | Facility: CLINIC | Age: 85
End: 2020-05-14

## 2020-05-14 DIAGNOSIS — N30.00 ACUTE CYSTITIS WITHOUT HEMATURIA: Primary | ICD-10-CM

## 2020-05-14 DIAGNOSIS — G62.9 NEUROPATHY: ICD-10-CM

## 2020-05-14 PROCEDURE — 99443 PR PHYS/QHP TELEPHONE EVALUATION 21-30 MIN: CPT | Performed by: INTERNAL MEDICINE

## 2020-05-14 RX ORDER — SULFAMETHOXAZOLE AND TRIMETHOPRIM 800; 160 MG/1; MG/1
1 TABLET ORAL 2 TIMES DAILY
Qty: 14 TABLET | Refills: 0 | Status: SHIPPED | OUTPATIENT
Start: 2020-05-14 | End: 2020-06-04

## 2020-05-14 NOTE — PROGRESS NOTES
Subjective   Marce Williamson is a 85 y.o. female.     No chief complaint on file.      History of Present Illness   You have chosen to receive care through a telephone visit. Do you consent to use a telephone visit for your medical care today? Yes    States the urine is dark and sees things floating in the urine.  Has urinary frequency and states hurts and burning on urination.  Present for the last 1 week.  Has tried the cipro she had left from a previous prescription but no relief.  She continues with chronic back and rectal/leg pains for which she discusses again.  The patient then began discussing her issues and difficulty coping with the issues of her  in rehab and NH.  She feels frustrated and upset her  is not home with her to care for her.  She repeats the story in circular pattern and repeats parts of the story.        The following portions of the patient's history were reviewed and updated as appropriate: allergies, current medications, past family history, past medical history, past social history, past surgical history and problem list.    Depression Screen:  PHQ-2/PHQ-9 Depression Screening 1/28/2020   Little interest or pleasure in doing things 0   Feeling down, depressed, or hopeless 0   Trouble falling or staying asleep, or sleeping too much 3   Feeling tired or having little energy 1   Poor appetite or overeating 3   Feeling bad about yourself - or that you are a failure or have let yourself or your family down 0   Trouble concentrating on things, such as reading the newspaper or watching television 0   Moving or speaking so slowly that other people could have noticed. Or the opposite - being so fidgety or restless that you have been moving around a lot more than usual 0   Thoughts that you would be better off dead, or of hurting yourself in some way 0   Total Score 7       Past Medical History:   Diagnosis Date   • Abdominal cramping    • Abdominal pain, left lateral    •  Abnormal CT of the abdomen    • Allergic rhinitis    • Anxiety    • Arthralgia of multiple sites    • Arthritis    • Atrial fibrillation (CMS/HCC)    • Back pain    • BMI 25.0-25.9,adult    • Breast lump    • Calf pain    • Cervical spondylosis    • Constipation    • DDD (degenerative disc disease), lumbar    • Difficulty swallowing    • Diverticulitis of colon    • Dysphagia    • Dysuria    • Easy bruising    • Edema    • Elevated d-dimer    • Episodic tension-type headache, not intractable    • External hemorrhoids    • Fatigue    • Full thickness burn of trunk    • Generalized osteoarthritis of multiple sites    • Headache    • Heart murmur    • Heme positive stool    • Hiatal hernia    • High risk medication use    • History of colonoscopy    • History of echocardiogram 02/22/2019    EF 63% There is fibrocalcific sclerosis of the aortic valve without evidence of aortic stenosis. Mild TR. Trace MR.    • History of leukocytosis    • History of pneumonia     Left lower lobe   • History of rectal bleeding     History of blood per rectum-Abstracted from [facility]   • History of stress test 01/23/2017    Probably normal Lexiscan cardiolite stress. No evidence of significant pharmacologic induced ischemia. No previos infarctions zones. Normal LV systolic function.    • History of transfusion     AFTER HIATAL HERNIA SX   • Hypertension    • Irregular cardiac rhythm    • Irritable bowel syndrome    • Left flank pain    • Left kidney mass    • Left lower quadrant pain    • Localized osteoarthritis of left shoulder    • Low back pain    • Lumbar foraminal stenosis    • Middle ear effusion    • Nausea     Nausea alone-Abstracted from [facility]   • Neural foraminal stenosis of cervical spine    • Nontoxic single thyroid nodule    • Osteopenia    • Other screening mammogram    • Pain in both lower extremities    • Pain in joint of right hip    • Pain in right buttock    • Pain of both hip joints    • Pain of upper extremity     • Polyp of sigmoid colon    • Rectal pain    • Renal cyst, left    • Sciatic pain, right    • Short of breath on exertion    • Shortness of breath    • Shoulder pain    • Skin lesion    • Swelling of lower extremity    • Throat mass     CYST ON RIGHT SIDE   • Tinnitus, right    • Urinary frequency    • Urinary tract infection    • Vitamin B12 deficiency    • Vitamin C deficiency    • Vulvovaginitis    • Weight loss        Past Surgical History:   Procedure Laterality Date   • ANTERIOR CERVICAL DISCECTOMY W/ FUSION N/A 4/13/2016    Procedure: C-4-C-6 CERVICAL DISCECTOMY ANTERIOR FUSION WITH INSTRUMENTATION;  Surgeon: Blaine Jim DO;  Location: [facility] OR;  Service:    • BACK SURGERY      Dr. Stringer 2001 and Dr. Warren 2002   • CHOLECYSTECTOMY     • COLONOSCOPY      4/6/17 Normal, 3/2013 with diverticuli Dr. Dacosta   • HERNIA REPAIR     • HYSTERECTOMY     • LUMBAR FUSION      L1-L3 fusion   • REPLACEMENT TOTAL KNEE Right    • SHOULDER SURGERY         Family History   Problem Relation Age of Onset   • Heart failure Mother         CONGESTIVE   • Breast cancer Father    • Emphysema Father    • Hypertension Father    • Breast cancer Sister    • Other Sister         POLIO, 1963   • Diabetes Son    • Pancreatic cancer Son    • No Known Problems Other        Social History     Socioeconomic History   • Marital status:      Spouse name: Not on file   • Number of children: Not on file   • Years of education: Not on file   • Highest education level: Not on file   Tobacco Use   • Smoking status: Never Smoker   • Smokeless tobacco: Never Used   Substance and Sexual Activity   • Alcohol use: No   • Drug use: No   • Sexual activity: Defer       Current Outpatient Medications   Medication Sig Dispense Refill   • acebutolol (SECTRAL) 200 MG capsule TAKE 1 CAPSULE EVERY DAY 90 capsule 3   • aspirin 81 MG EC tablet Take 81 mg by mouth Daily.     • ciprofloxacin (CIPRO) 500 MG tablet TAKE 1 TABLET BY MOUTH TWICE DAILY 20  tablet 0   • dicyclomine (BENTYL) 20 MG tablet dicyclomine 20 mg tablet     • doxycycline (VIBRAMYCIN) 100 MG capsule doxycycline hyclate 100 mg capsule     • DULoxetine (CYMBALTA) 30 MG capsule Take 1 capsule by mouth Daily. 30 capsule 3   • gabapentin (NEURONTIN) 400 MG capsule Take 2 capsules by mouth 3 (Three) Times a Day. 180 capsule 1   • HYDROcodone-acetaminophen (NORCO) 7.5-325 MG per tablet 7.5 tablets.  0   • ibuprofen (ADVIL,MOTRIN) 800 MG tablet   0   • lisinopril (PRINIVIL,ZESTRIL) 5 MG tablet Take 1 tablet by mouth Daily. take 1 tablet by mouth once daily 90 tablet 1   • lubiprostone (AMITIZA) 8 MCG capsule Take 1 capsule by mouth 2 (Two) Times a Day With Meals. 60 capsule 3   • meloxicam (MOBIC) 15 MG tablet Take 1 tablet by mouth Daily. 90 tablet 1   • omeprazole (priLOSEC) 20 MG capsule Take 1 capsule by mouth Daily. 90 capsule 3   • polyethylene glycol (MIRALAX) powder powder Take 17 g by mouth Daily. 500 g 1   • propafenone (RYTHMOL) 150 MG tablet Take 1 tablet by mouth Every 12 (Twelve) Hours. 60 tablet 5   • rOPINIRole (REQUIP) 3 MG tablet 3 mg.       No current facility-administered medications for this visit.        Review of Systems   Constitutional: Negative for activity change, appetite change, fatigue, fever, unexpected weight gain and unexpected weight loss.   HENT: Negative for nosebleeds, rhinorrhea, trouble swallowing and voice change.    Eyes: Negative for visual disturbance.   Respiratory: Negative for cough, chest tightness, shortness of breath and wheezing.    Cardiovascular: Negative for chest pain, palpitations and leg swelling.   Gastrointestinal: Negative for abdominal pain, blood in stool, constipation, diarrhea, nausea, vomiting, GERD and indigestion.        Chronic rectal pain   Genitourinary: Positive for dysuria, frequency, urgency and vaginal pain. Negative for hematuria and pelvic pressure.   Musculoskeletal: Positive for arthralgias and back pain. Negative for myalgias.    Skin: Negative for rash and bruise.   Neurological: Negative for dizziness, tremors, weakness, light-headedness, numbness, headache and memory problem.   Hematological: Negative for adenopathy. Does not bruise/bleed easily.   Psychiatric/Behavioral: Negative for sleep disturbance and depressed mood. The patient is not nervous/anxious.        Objective   There were no vitals taken for this visit.    Virtual Visit Physical Exam    Recent Results (from the past 2016 hour(s))   CBC AND DIFFERENTIAL    Collection Time: 03/10/20  4:19 PM   Result Value Ref Range    WBC 5.53 4.5 - 11.0 10*3/uL    RBC 3.93 (L) 4.0 - 5.2 10*6/uL    Hemoglobin 11.7 (L) 12.0 - 16.0 g/dL    Hematocrit 36.0 36.0 - 46.0 %    MCV 91.6 80.0 - 100.0 fL    MCH 29.8 26.0 - 34.0 pg    MCHC 32.5 31.0 - 37.0 g/dL    RDW 13.3 12.0 - 16.8 %    Platelets 141 140 - 440 10*3/uL    MPV 12.0 (H) 6.7 - 10.8 fL    Differential Type [facility] CBC w/AutoDiff (arb'U)    Neutrophil Rel % 57.1 45 - 80 %    Lymphocyte Rel % 28.2 15 - 50 %    Monocyte Rel % 11.0 0 - 15 %    Eosinophil % 2.9 0 - 7 %    Basophil Rel % 0.4 0 - 2 %    Immature Grans % 0.4 (H) 0 %    nRBC 0 0 /100(WBC)    Neutrophils Absolute 3.16 2.0 - 8.8 10*3/uL    Lymphocytes Absolute 1.56 0.7 - 5.5 10*3/uL    Monocytes Absolute 0.61 0.0 - 1.7 10*3/uL    Eosinophils Absolute 0.16 0.0 - 0.8 10*3/uL    Basophils Absolute 0.02 0.0 - 0.2 10*3/uL    Immature Grans, Absolute 0.02 <1 10*3/uL   BASIC METABOLIC PANEL    Collection Time: 03/10/20  4:19 PM   Result Value Ref Range    Sodium 136 (L) 137 - 145 mmol/L    Potassium 4.0 3.5 - 5.1 mmol/L    Chloride 99 98 - 107 mmol/L    Total CO2 28 22 - 30 mmol/L    Glucose 97 74 - 99 mg/dL    BUN 12 7 - 20 mg/dL    Creatinine 0.6 (L) 0.7 - 1.5 mg/dL    BUN/Creatinine Ratio 20.0 RATIO    Est GFR by Clearance >60 >60 /1.73 m2    Calcium 8.8 8.4 - 10.2 mg/dL   LACTIC ACID, PLASMA    Collection Time: 03/10/20  4:19 PM   Result Value Ref Range    Lactate 0.5 (L) 0.7 - 2.0  mmol/L   POCT urinalysis dipstick, automated    Collection Time: 03/17/20 10:37 AM   Result Value Ref Range    Color Yellow Yellow, Straw, Dark Yellow, Nilam    Clarity, UA Clear Clear    Specific Gravity  1.020 1.005 - 1.030    pH, Urine 7.0 5.0 - 8.0    Leukocytes Negative Negative    Nitrite, UA Negative Negative    Protein, POC Negative Negative mg/dL    Glucose, UA Negative Negative, 1000 mg/dL (3+) mg/dL    Ketones, UA Negative Negative    Urobilinogen, UA Normal Normal    Bilirubin Negative Negative    Blood, UA Negative Negative     Assessment/Plan   There are no diagnoses linked to this encounter.  Patient with UTI symptoms and will treat with Bactrim DS as cannot tolerate cephalexin and has recently taken cipro.  If persisting issues then will need culture.    I discussed with patient the rectal, leg and vaginal pain is related to the back and there is nothing I can do at this time for this.    Telephone visit completed.  Time of visit in discussion and care of patient along with counseling and discussion about her concern over her  and one-on-one care not including charting time of 45 minutes.

## 2020-06-04 ENCOUNTER — OFFICE VISIT (OUTPATIENT)
Dept: FAMILY MEDICINE CLINIC | Facility: CLINIC | Age: 85
End: 2020-06-04

## 2020-06-04 VITALS
WEIGHT: 147 LBS | HEIGHT: 66 IN | TEMPERATURE: 98.1 F | OXYGEN SATURATION: 98 % | BODY MASS INDEX: 23.63 KG/M2 | SYSTOLIC BLOOD PRESSURE: 120 MMHG | HEART RATE: 68 BPM | DIASTOLIC BLOOD PRESSURE: 68 MMHG

## 2020-06-04 DIAGNOSIS — R23.4 ESCHAR: ICD-10-CM

## 2020-06-04 DIAGNOSIS — K21.9 GASTROESOPHAGEAL REFLUX DISEASE WITHOUT ESOPHAGITIS: ICD-10-CM

## 2020-06-04 DIAGNOSIS — R20.2 PARESTHESIA OF LOWER EXTREMITY: ICD-10-CM

## 2020-06-04 DIAGNOSIS — R35.0 URINARY FREQUENCY: Primary | ICD-10-CM

## 2020-06-04 DIAGNOSIS — I10 ESSENTIAL HYPERTENSION: ICD-10-CM

## 2020-06-04 DIAGNOSIS — N39.45 CONTINUOUS LEAKAGE OF URINE: ICD-10-CM

## 2020-06-04 LAB
BILIRUB BLD-MCNC: NEGATIVE MG/DL
CLARITY, POC: CLEAR
COLOR UR: YELLOW
GLUCOSE UR STRIP-MCNC: NEGATIVE MG/DL
KETONES UR QL: NEGATIVE
LEUKOCYTE EST, POC: NEGATIVE
NITRITE UR-MCNC: NEGATIVE MG/ML
PH UR: 6 [PH] (ref 5–8)
PROT UR STRIP-MCNC: NEGATIVE MG/DL
RBC # UR STRIP: NEGATIVE /UL
SP GR UR: 1.02 (ref 1–1.03)
UROBILINOGEN UR QL: NORMAL

## 2020-06-04 PROCEDURE — 81003 URINALYSIS AUTO W/O SCOPE: CPT | Performed by: INTERNAL MEDICINE

## 2020-06-04 PROCEDURE — 99214 OFFICE O/P EST MOD 30 MIN: CPT | Performed by: INTERNAL MEDICINE

## 2020-06-04 RX ORDER — GABAPENTIN 400 MG/1
800 CAPSULE ORAL 3 TIMES DAILY
Qty: 180 CAPSULE | Refills: 1 | Status: SHIPPED | OUTPATIENT
Start: 2020-06-04 | End: 2020-07-01 | Stop reason: SDUPTHER

## 2020-06-04 RX ORDER — OMEPRAZOLE 20 MG/1
20 CAPSULE, DELAYED RELEASE ORAL DAILY
Qty: 90 CAPSULE | Refills: 3 | Status: SHIPPED | OUTPATIENT
Start: 2020-06-04 | End: 2021-12-31 | Stop reason: HOSPADM

## 2020-06-04 RX ORDER — LISINOPRIL 5 MG/1
5 TABLET ORAL DAILY
Qty: 90 TABLET | Refills: 1 | Status: SHIPPED | OUTPATIENT
Start: 2020-06-04 | End: 2021-01-05 | Stop reason: SDUPTHER

## 2020-06-04 NOTE — PROGRESS NOTES
Subjective   Marce Williamson is a 85 y.o. female.     Chief Complaint   Patient presents with   • Urine Leakage   • Back Pain     arms and hands going numb       History of Present Illness   Patient complains of chronic burning on urination and urinary leakage.  This has been chronic for several months and has to use the pad all the time.  Has been on several antibiotics but none currently.  Patient is otherwise doing well other than issues and concerns with her  and his care in a rehab/long term facility.    The following portions of the patient's history were reviewed and updated as appropriate: allergies, current medications, past family history, past medical history, past social history, past surgical history and problem list.    Depression Screen:  PHQ-2/PHQ-9 Depression Screening 1/28/2020   Little interest or pleasure in doing things 0   Feeling down, depressed, or hopeless 0   Trouble falling or staying asleep, or sleeping too much 3   Feeling tired or having little energy 1   Poor appetite or overeating 3   Feeling bad about yourself - or that you are a failure or have let yourself or your family down 0   Trouble concentrating on things, such as reading the newspaper or watching television 0   Moving or speaking so slowly that other people could have noticed. Or the opposite - being so fidgety or restless that you have been moving around a lot more than usual 0   Thoughts that you would be better off dead, or of hurting yourself in some way 0   Total Score 7       Past Medical History:   Diagnosis Date   • Abdominal cramping    • Abdominal pain, left lateral    • Abnormal CT of the abdomen    • Allergic rhinitis    • Anxiety    • Arthralgia of multiple sites    • Arthritis    • Atrial fibrillation (CMS/HCC)    • Back pain    • BMI 25.0-25.9,adult    • Breast lump    • Calf pain    • Cervical spondylosis    • Constipation    • DDD (degenerative disc disease), lumbar    • Difficulty swallowing     • Diverticulitis of colon    • Dysphagia    • Dysuria    • Easy bruising    • Edema    • Elevated d-dimer    • Episodic tension-type headache, not intractable    • External hemorrhoids    • Fatigue    • Full thickness burn of trunk    • Generalized osteoarthritis of multiple sites    • Headache    • Heart murmur    • Heme positive stool    • Hiatal hernia    • High risk medication use    • History of colonoscopy    • History of echocardiogram 02/22/2019    EF 63% There is fibrocalcific sclerosis of the aortic valve without evidence of aortic stenosis. Mild TR. Trace MR.    • History of leukocytosis    • History of pneumonia     Left lower lobe   • History of rectal bleeding     History of blood per rectum-Abstracted from [facility]   • History of stress test 01/23/2017    Probably normal Lexiscan cardiolite stress. No evidence of significant pharmacologic induced ischemia. No previos infarctions zones. Normal LV systolic function.    • History of transfusion     AFTER HIATAL HERNIA SX   • Hypertension    • Irregular cardiac rhythm    • Irritable bowel syndrome    • Left flank pain    • Left kidney mass    • Left lower quadrant pain    • Localized osteoarthritis of left shoulder    • Low back pain    • Lumbar foraminal stenosis    • Middle ear effusion    • Nausea     Nausea alone-Abstracted from [facility]   • Neural foraminal stenosis of cervical spine    • Nontoxic single thyroid nodule    • Osteopenia    • Other screening mammogram    • Pain in both lower extremities    • Pain in joint of right hip    • Pain in right buttock    • Pain of both hip joints    • Pain of upper extremity    • Polyp of sigmoid colon    • Rectal pain    • Renal cyst, left    • Sciatic pain, right    • Short of breath on exertion    • Shortness of breath    • Shoulder pain    • Skin lesion    • Swelling of lower extremity    • Throat mass     CYST ON RIGHT SIDE   • Tinnitus, right    • Urinary frequency    • Urinary tract infection    •  Vitamin B12 deficiency    • Vitamin C deficiency    • Vulvovaginitis    • Weight loss        Past Surgical History:   Procedure Laterality Date   • ANTERIOR CERVICAL DISCECTOMY W/ FUSION N/A 4/13/2016    Procedure: C-4-C-6 CERVICAL DISCECTOMY ANTERIOR FUSION WITH INSTRUMENTATION;  Surgeon: Blaine Jim DO;  Location: [facility] OR;  Service:    • BACK SURGERY      Dr. Stringer 2001 and Dr. Warren 2002   • CHOLECYSTECTOMY     • COLONOSCOPY      4/6/17 Normal, 3/2013 with diverticuli Dr. Dacosta   • HERNIA REPAIR     • HYSTERECTOMY     • LUMBAR FUSION      L1-L3 fusion   • REPLACEMENT TOTAL KNEE Right    • SHOULDER SURGERY         Family History   Problem Relation Age of Onset   • Heart failure Mother         CONGESTIVE   • Breast cancer Father    • Emphysema Father    • Hypertension Father    • Breast cancer Sister    • Other Sister         POLIO, 1963   • Diabetes Son    • Pancreatic cancer Son    • No Known Problems Other        Social History     Socioeconomic History   • Marital status:      Spouse name: Not on file   • Number of children: Not on file   • Years of education: Not on file   • Highest education level: Not on file   Tobacco Use   • Smoking status: Never Smoker   • Smokeless tobacco: Never Used   Substance and Sexual Activity   • Alcohol use: No   • Drug use: No   • Sexual activity: Defer       Current Outpatient Medications   Medication Sig Dispense Refill   • acebutolol (SECTRAL) 200 MG capsule TAKE 1 CAPSULE EVERY DAY 90 capsule 3   • aspirin 81 MG EC tablet Take 81 mg by mouth Daily.     • DULoxetine (CYMBALTA) 30 MG capsule Take 1 capsule by mouth Daily. 30 capsule 3   • gabapentin (NEURONTIN) 400 MG capsule Take 2 capsules by mouth 3 (Three) Times a Day. 180 capsule 1   • lisinopril (PRINIVIL,ZESTRIL) 5 MG tablet Take 1 tablet by mouth Daily. take 1 tablet by mouth once daily 90 tablet 1   • omeprazole (priLOSEC) 20 MG capsule Take 1 capsule by mouth Daily. 90 capsule 3   • polyethylene glycol  (MIRALAX) powder powder Take 17 g by mouth Daily. 500 g 1   • propafenone (RYTHMOL) 150 MG tablet Take 1 tablet by mouth Every 12 (Twelve) Hours. 60 tablet 5   • ciprofloxacin (CIPRO) 500 MG tablet TAKE 1 TABLET BY MOUTH TWICE DAILY 20 tablet 0   • dicyclomine (BENTYL) 20 MG tablet dicyclomine 20 mg tablet     • HYDROcodone-acetaminophen (NORCO) 7.5-325 MG per tablet 7.5 tablets.  0   • ibuprofen (ADVIL,MOTRIN) 800 MG tablet   0   • lubiprostone (AMITIZA) 8 MCG capsule Take 1 capsule by mouth 2 (Two) Times a Day With Meals. 60 capsule 3   • meloxicam (MOBIC) 15 MG tablet Take 1 tablet by mouth Daily. 90 tablet 1   • rOPINIRole (REQUIP) 3 MG tablet 3 mg.       No current facility-administered medications for this visit.        Review of Systems   Constitutional: Negative for activity change, appetite change, fatigue, fever, unexpected weight gain and unexpected weight loss.   HENT: Negative for nosebleeds, rhinorrhea, trouble swallowing and voice change.    Eyes: Negative for visual disturbance.   Respiratory: Negative for cough, chest tightness, shortness of breath and wheezing.    Cardiovascular: Negative for chest pain, palpitations and leg swelling.   Gastrointestinal: Negative for abdominal pain, blood in stool, constipation, diarrhea, nausea, vomiting, GERD and indigestion.        Complains of chronic rectal pain   Genitourinary: Positive for dysuria and urinary incontinence. Negative for frequency and hematuria.   Musculoskeletal: Positive for arthralgias and back pain. Negative for myalgias.   Skin: Negative for rash and bruise.   Neurological: Negative for dizziness, tremors, weakness, light-headedness, numbness, headache and memory problem.   Hematological: Negative for adenopathy. Does not bruise/bleed easily.   Psychiatric/Behavioral: Negative for sleep disturbance and depressed mood. The patient is not nervous/anxious.        Objective   /68 (BP Location: Left arm, Patient Position: Sitting, Cuff  "Size: Adult)   Pulse 68   Temp 98.1 °F (36.7 °C) (Temporal)   Ht 167.6 cm (65.98\")   Wt 66.7 kg (147 lb)   SpO2 98%   BMI 23.74 kg/m²     Physical Exam   Constitutional: She is oriented to person, place, and time. She appears well-developed and well-nourished. No distress.   HENT:   Head: Normocephalic and atraumatic.   Right Ear: External ear normal.   Left Ear: External ear normal.   Nose: Nose normal.   Mouth/Throat: Oropharynx is clear and moist.   Eyes: Pupils are equal, round, and reactive to light. Conjunctivae and EOM are normal.   Neck: Normal range of motion. Neck supple. No tracheal deviation present. No thyromegaly present.   Cardiovascular: Normal rate, regular rhythm, normal heart sounds and intact distal pulses. Exam reveals no gallop and no friction rub.   No murmur heard.  Pulmonary/Chest: Effort normal and breath sounds normal. No respiratory distress.   Abdominal: Soft. Bowel sounds are normal. She exhibits no mass. There is no tenderness. There is no guarding.   Musculoskeletal: Normal range of motion. She exhibits no edema.   Lymphadenopathy:     She has no cervical adenopathy.   Neurological: She is alert and oriented to person, place, and time. She displays normal reflexes. She exhibits normal muscle tone.   Skin: Skin is warm and dry. Capillary refill takes less than 2 seconds. No rash noted. She is not diaphoretic.   Psychiatric: She has a normal mood and affect. Her behavior is normal. Judgment and thought content normal.   Nursing note and vitals reviewed.      Recent Results (from the past 2016 hour(s))   POCT urinalysis dipstick, automated    Collection Time: 03/17/20 10:37 AM   Result Value Ref Range    Color Yellow Yellow, Straw, Dark Yellow, Nilam    Clarity, UA Clear Clear    Specific Gravity  1.020 1.005 - 1.030    pH, Urine 7.0 5.0 - 8.0    Leukocytes Negative Negative    Nitrite, UA Negative Negative    Protein, POC Negative Negative mg/dL    Glucose, UA Negative Negative, " 1000 mg/dL (3+) mg/dL    Ketones, UA Negative Negative    Urobilinogen, UA Normal Normal    Bilirubin Negative Negative    Blood, UA Negative Negative   POC Urinalysis Dipstick, Automated    Collection Time: 06/04/20  4:02 PM   Result Value Ref Range    Color Yellow Yellow, Straw, Dark Yellow, Nilam    Clarity, UA Clear Clear    Specific Gravity  1.025 1.005 - 1.030    pH, Urine 6.0 5.0 - 8.0    Leukocytes Negative Negative    Nitrite, UA Negative Negative    Protein, POC Negative Negative mg/dL    Glucose, UA Negative Negative, 1000 mg/dL (3+) mg/dL    Ketones, UA Negative Negative    Urobilinogen, UA Normal Normal    Bilirubin Negative Negative    Blood, UA Negative Negative     Assessment/Plan   Marce was seen today for urine leakage and back pain.    Diagnoses and all orders for this visit:    Urinary frequency  -     POC Urinalysis Dipstick, Automated    Continuous leakage of urine  -     POC Urinalysis Dipstick, Automated    Paresthesia of lower extremity  -     gabapentin (NEURONTIN) 400 MG capsule; Take 2 capsules by mouth 3 (Three) Times a Day.    Essential hypertension  -     lisinopril (PRINIVIL,ZESTRIL) 5 MG tablet; Take 1 tablet by mouth Daily. take 1 tablet by mouth once daily    Gastroesophageal reflux disease without esophagitis  -     omeprazole (priLOSEC) 20 MG capsule; Take 1 capsule by mouth Daily.    Eschar  -     Ambulatory Referral to Wound Clinic    Discussed with patient the urinary issues are chronic and bladder related.  No evidence of infection.  Hypertension controlled.  Will continue the current medications unchanged.  Patient anxious and upset discussing her  Ed and his long term care.  After frequent redirection in the office and discussion visit completed after 50 minutes.    Addendum:  After visit was completed and I had left the room patient stood up and then remembered that she had one other concern and that she has a sore on her right hip.  Apparently has been there for 2  to 3 weeks at least and she does not remember any kind of injury to the area.  It has not been healing but does not have any drainage she has not been doing any other treatments for this area as far as she could relate to us.  Physical exam demonstrated an approximately 2.5 cm deep eschar black lesion with some surrounding erythema of the normal tissue and a raised border.  No drainage and the eschar is dry.  Diagnosis of a dry eschar right hip likely secondary to pressure ulcer.  Plan is to refer to wound care management will likely need debridement.

## 2020-07-01 ENCOUNTER — OFFICE VISIT (OUTPATIENT)
Dept: FAMILY MEDICINE CLINIC | Facility: CLINIC | Age: 85
End: 2020-07-01

## 2020-07-01 VITALS
HEART RATE: 78 BPM | BODY MASS INDEX: 23.14 KG/M2 | WEIGHT: 144 LBS | DIASTOLIC BLOOD PRESSURE: 62 MMHG | TEMPERATURE: 98.7 F | OXYGEN SATURATION: 96 % | SYSTOLIC BLOOD PRESSURE: 110 MMHG | HEIGHT: 66 IN

## 2020-07-01 DIAGNOSIS — I10 ESSENTIAL HYPERTENSION: ICD-10-CM

## 2020-07-01 DIAGNOSIS — M51.36 DEGENERATION OF INTERVERTEBRAL DISC OF LUMBAR REGION: ICD-10-CM

## 2020-07-01 DIAGNOSIS — S71.101S WOUND, OPEN, HIP OR THIGH, RIGHT, SEQUELA: ICD-10-CM

## 2020-07-01 DIAGNOSIS — F39 MOOD DISORDER (HCC): ICD-10-CM

## 2020-07-01 DIAGNOSIS — R35.0 URINARY FREQUENCY: Primary | ICD-10-CM

## 2020-07-01 DIAGNOSIS — G62.9 NEUROPATHY: ICD-10-CM

## 2020-07-01 DIAGNOSIS — F41.9 ANXIETY: ICD-10-CM

## 2020-07-01 DIAGNOSIS — S71.001S WOUND, OPEN, HIP OR THIGH, RIGHT, SEQUELA: ICD-10-CM

## 2020-07-01 DIAGNOSIS — R20.2 PARESTHESIA OF LOWER EXTREMITY: ICD-10-CM

## 2020-07-01 LAB
BILIRUB BLD-MCNC: NEGATIVE MG/DL
CLARITY, POC: CLEAR
COLOR UR: NORMAL
GLUCOSE UR STRIP-MCNC: NEGATIVE MG/DL
KETONES UR QL: NEGATIVE
LEUKOCYTE EST, POC: NEGATIVE
NITRITE UR-MCNC: NEGATIVE MG/ML
PH UR: 6 [PH] (ref 5–8)
PROT UR STRIP-MCNC: NEGATIVE MG/DL
RBC # UR STRIP: NEGATIVE /UL
SP GR UR: 1.02 (ref 1–1.03)
UROBILINOGEN UR QL: NORMAL

## 2020-07-01 PROCEDURE — 81003 URINALYSIS AUTO W/O SCOPE: CPT | Performed by: INTERNAL MEDICINE

## 2020-07-01 PROCEDURE — 99215 OFFICE O/P EST HI 40 MIN: CPT | Performed by: INTERNAL MEDICINE

## 2020-07-01 RX ORDER — RISPERIDONE 0.25 MG/1
0.25 TABLET ORAL 2 TIMES DAILY
Qty: 60 TABLET | Refills: 2 | Status: SHIPPED | OUTPATIENT
Start: 2020-07-01 | End: 2020-10-07

## 2020-07-01 RX ORDER — GABAPENTIN 400 MG/1
800 CAPSULE ORAL 3 TIMES DAILY
Qty: 180 CAPSULE | Refills: 1 | Status: SHIPPED | OUTPATIENT
Start: 2020-07-01 | End: 2020-12-07 | Stop reason: SDUPTHER
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
## 2020-07-07 ENCOUNTER — TELEPHONE (OUTPATIENT)
Dept: FAMILY MEDICINE CLINIC | Facility: CLINIC | Age: 85
End: 2020-07-07

## 2020-07-07 RX ORDER — ACEBUTOLOL HYDROCHLORIDE 200 MG/1
200 CAPSULE ORAL DAILY
Qty: 90 CAPSULE | Refills: 3 | Status: SHIPPED | OUTPATIENT
Start: 2020-07-07 | End: 2020-07-08 | Stop reason: SDUPTHER

## 2020-07-07 NOTE — TELEPHONE ENCOUNTER
Patient states her skin is really dry and her hair is falling out some.  She said she used to be on a hormone pill but hasn't taken it lately since her  has been bad.  She lost the bottle so she doesn't remember the name.  She wants to know if you could call her something in.  Pharmacy is [facility] on file.  Her phone number is [phone].

## 2020-07-07 NOTE — TELEPHONE ENCOUNTER
Do not know of any hormone pills that she would have been on.  Not recommend any estrogen or progesterone hormones due to side effects and risks.  I do not see that she has been on any thyroid hormones.  The hair falling out may likely be secondary to the stresses with her 's illness and death.  I do not recommend any new medications at this time.   Name band;

## 2020-07-07 NOTE — TELEPHONE ENCOUNTER
[facility] called. They have faxed a request for Acebutolol today.  Patient has asked if we can also send a short supply to her local pharmacy while [facility] tries to get it to her.  She is out.      [facility] [phone]

## 2020-07-08 RX ORDER — ACEBUTOLOL HYDROCHLORIDE 200 MG/1
200 CAPSULE ORAL DAILY
Qty: 14 CAPSULE | Refills: 0 | Status: SHIPPED | OUTPATIENT
Start: 2020-07-08 | End: 2021-08-25 | Stop reason: SDUPTHER

## 2020-07-08 NOTE — TELEPHONE ENCOUNTER
Spoke with patient. She was not happy with the decision per Dr. Khalil due to severe side effects of any hormone drug. I informed she could contact her gynecologist to be evaluated

## 2020-07-15 ENCOUNTER — TELEPHONE (OUTPATIENT)
Dept: FAMILY MEDICINE CLINIC | Facility: CLINIC | Age: 85
End: 2020-07-15

## 2020-07-15 NOTE — TELEPHONE ENCOUNTER
Evaluated general patient appearance/condition. Patient lying in bed watching televsion. Respirations are even and unlabored on room air  with no signs of respiratory distress observed.Telemetry monitor connected with alarms in place. IV site to left antecubital and is intact with no signs of redness, swelling, or discomfort observed. Safety maintained with call light and phone in reach, with side rails up x2.    Patient has a mammo referral order from March , can we still use this order s it looks like it has not been done. Please advise and patient request

## 2020-07-15 NOTE — TELEPHONE ENCOUNTER
Found a lump in her left breast. [facility] asking for order for MEHRDAD DIAG MAMMO W/ US LTD LT BREAST.  Fax [phone]

## 2020-07-16 NOTE — TELEPHONE ENCOUNTER
The march order is just a screening,   They will need a bilateral diagnostic mamm order and a left breast ultrasound order.        As soon as this is entered I will fax.       Thank you

## 2020-07-21 ENCOUNTER — TELEPHONE (OUTPATIENT)
Dept: FAMILY MEDICINE CLINIC | Facility: CLINIC | Age: 85
End: 2020-07-21

## 2020-07-21 DIAGNOSIS — N63.23 UNSPECIFIED LUMP IN THE LEFT BREAST, LOWER OUTER QUADRANT: ICD-10-CM

## 2020-07-21 DIAGNOSIS — N63.24 UNSPECIFIED LUMP IN THE LEFT BREAST, LOWER INNER QUADRANT: ICD-10-CM

## 2020-07-21 DIAGNOSIS — R35.0 URINARY FREQUENCY: Primary | ICD-10-CM

## 2020-07-21 DIAGNOSIS — N63.20 LEFT BREAST LUMP: Primary | ICD-10-CM

## 2020-07-21 NOTE — TELEPHONE ENCOUNTER
Spoke with Erin in referrals she is working on order to see how fast we can get her in. If patient is still having transportation issues our concern is how will she get to that appointment

## 2020-07-21 NOTE — TELEPHONE ENCOUNTER
Spoke with patient. I recommended patient to go to the Emergency room (patient denies) as she cannot drive herself. I told her to call 911 and they can send an ambulance to her house to take her. Patient does not want to do that. Patient would like for Dr. Khalil to put in an order for a Nephrologist and make it STAT , patient states when she drinks anything it comes straight out.

## 2020-07-21 NOTE — TELEPHONE ENCOUNTER
Pt called back in was informed that a referral for urology was placed. Pt states she needs to get in ASAP if not she will have to go to the ER.

## 2020-07-21 NOTE — TELEPHONE ENCOUNTER
Patient states she is dehydrated, is constantly urinating, her skin is dry & her throat is so dry she can hardly talk, she wants to know if she should go get fluids at the hospital. She is also hurting is her vaginal / rectal area & wants to know if this could be because she was constipated, took a couple of laxatives & had several bowel movents day before yesterday, please advise

## 2020-08-01 ENCOUNTER — TELEPHONE (OUTPATIENT)
Dept: FAMILY MEDICINE CLINIC | Facility: CLINIC | Age: 85
End: 2020-08-01

## 2020-08-01 NOTE — TELEPHONE ENCOUNTER
Spoke with patient and informed her of the appointment that was made for a Urologist on 7/27 at 11am. I informed her she will need to keep this appointment as we have done everything here in office for her symptoms and she really needs to see this specialist.    No
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
## 2020-08-02 ENCOUNTER — TELEPHONE (OUTPATIENT)
Dept: FAMILY MEDICINE CLINIC | Facility: CLINIC | Age: 85
End: 2020-08-02

## 2020-08-02 NOTE — TELEPHONE ENCOUNTER
Patient called answering service with c/o shortness of breath; spoke with patient over the phone; pt states she is upset over events from earlier in the day; no chest pain; instructed to call EMS due to hip pain and SOA; pt verbalized understanding.

## 2020-08-03 ENCOUNTER — TELEPHONE (OUTPATIENT)
Dept: FAMILY MEDICINE CLINIC | Facility: CLINIC | Age: 85
End: 2020-08-03

## 2020-08-03 NOTE — TELEPHONE ENCOUNTER
FYI Patient called and states she is having severe pain all over and her left leg is tingling.  She refused to go to the hospital and get checked out and insisted on making an appointment with Dr Khalil tomorrow here at the [facility] office.  Her home number is [phone] and cell is [phone].

## 2020-08-03 NOTE — TELEPHONE ENCOUNTER
I am not sure there is much that I can do for her.  She is seeing pain management and that may be where she needs to go for her continued care for chronic pain.

## 2020-08-04 ENCOUNTER — TELEPHONE (OUTPATIENT)
Dept: FAMILY MEDICINE CLINIC | Facility: CLINIC | Age: 85
End: 2020-08-04

## 2020-08-04 NOTE — TELEPHONE ENCOUNTER
She does want to be referred to pain management.  She says she's  Having knee pain and pain in her hip that goes up to her lower back.  Her phone number is [phone].

## 2020-08-05 NOTE — TELEPHONE ENCOUNTER
Spoke with patient , I called pain management office that she used to see Dr Patel. I have her scheduled on 8/25 at 1:30. I informed patient and she is understanding and wrote all of the information in such as address , number and dr name.

## 2020-08-11 ENCOUNTER — OFFICE VISIT (OUTPATIENT)
Dept: FAMILY MEDICINE CLINIC | Facility: CLINIC | Age: 85
End: 2020-08-11

## 2020-08-11 VITALS
TEMPERATURE: 98.6 F | BODY MASS INDEX: 25.71 KG/M2 | DIASTOLIC BLOOD PRESSURE: 70 MMHG | SYSTOLIC BLOOD PRESSURE: 150 MMHG | WEIGHT: 160 LBS | HEIGHT: 66 IN | HEART RATE: 74 BPM | OXYGEN SATURATION: 98 %

## 2020-08-11 DIAGNOSIS — S71.001S WOUND, OPEN, HIP OR THIGH, RIGHT, SEQUELA: ICD-10-CM

## 2020-08-11 DIAGNOSIS — G89.29 CHRONIC BILATERAL LOW BACK PAIN, UNSPECIFIED WHETHER SCIATICA PRESENT: Primary | ICD-10-CM

## 2020-08-11 DIAGNOSIS — S71.101S WOUND, OPEN, HIP OR THIGH, RIGHT, SEQUELA: ICD-10-CM

## 2020-08-11 DIAGNOSIS — M54.50 CHRONIC BILATERAL LOW BACK PAIN, UNSPECIFIED WHETHER SCIATICA PRESENT: Primary | ICD-10-CM
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
## 2020-08-11 DIAGNOSIS — S80.811A ABRASION OF RIGHT LEG, INITIAL ENCOUNTER: ICD-10-CM

## 2020-08-11 PROBLEM — S40.812A ABRASION OF LEFT ARM: Status: RESOLVED | Noted: 2019-11-13 | Resolved: 2020-08-11

## 2020-08-11 PROBLEM — J20.8 ACUTE BRONCHITIS DUE TO OTHER SPECIFIED ORGANISMS: Status: RESOLVED | Noted: 2019-11-13 | Resolved: 2020-08-11

## 2020-08-11 PROCEDURE — 72100 X-RAY EXAM L-S SPINE 2/3 VWS: CPT | Performed by: INTERNAL MEDICINE

## 2020-08-11 PROCEDURE — 99214 OFFICE O/P EST MOD 30 MIN: CPT | Performed by: INTERNAL MEDICINE

## 2020-08-11 RX ORDER — LIDOCAINE 50 MG/G
1 PATCH TOPICAL EVERY 24 HOURS
Qty: 15 PATCH | Refills: 0 | Status: SHIPPED | OUTPATIENT
Start: 2020-08-11 | End: 2020-10-07
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
## 2020-10-07 ENCOUNTER — OFFICE VISIT (OUTPATIENT)
Dept: FAMILY MEDICINE CLINIC | Facility: CLINIC | Age: 85
End: 2020-10-07

## 2020-10-07 VITALS
BODY MASS INDEX: 23.46 KG/M2 | HEART RATE: 77 BPM | WEIGHT: 146 LBS | SYSTOLIC BLOOD PRESSURE: 124 MMHG | OXYGEN SATURATION: 96 % | HEIGHT: 66 IN | DIASTOLIC BLOOD PRESSURE: 78 MMHG | TEMPERATURE: 98.2 F

## 2020-10-07 DIAGNOSIS — M53.86 SCIATICA OF RIGHT SIDE ASSOCIATED WITH DISORDER OF LUMBAR SPINE: Primary | ICD-10-CM

## 2020-10-07 DIAGNOSIS — M25.561 RIGHT MEDIAL KNEE PAIN: ICD-10-CM

## 2020-10-07 DIAGNOSIS — R35.0 URINARY FREQUENCY: ICD-10-CM

## 2020-10-07 PROBLEM — T21.25XA PARTIAL THICKNESS BURN OF BUTTOCK: Status: RESOLVED | Noted: 2020-01-28 | Resolved: 2020-10-07

## 2020-10-07 PROBLEM — R23.4 ESCHAR: Status: RESOLVED | Noted: 2020-06-04 | Resolved: 2020-10-07

## 2020-10-07 PROBLEM — S80.811A ABRASION OF RIGHT LEG, INITIAL ENCOUNTER: Status: RESOLVED | Noted: 2020-08-11 | Resolved: 2020-10-07

## 2020-10-07 PROCEDURE — 99213 OFFICE O/P EST LOW 20 MIN: CPT | Performed by: INTERNAL MEDICINE

## 2020-10-07 PROCEDURE — 81003 URINALYSIS AUTO W/O SCOPE: CPT | Performed by: INTERNAL MEDICINE

## 2020-10-07 RX ORDER — MELOXICAM 15 MG/1
15 TABLET ORAL DAILY
Qty: 90 TABLET | Refills: 1 | Status: SHIPPED | OUTPATIENT
Start: 2020-10-07 | End: 2020-10-07

## 2020-10-07 RX ORDER — MELOXICAM 15 MG/1
15 TABLET ORAL DAILY
Qty: 90 TABLET | Refills: 1 | Status: SHIPPED | OUTPATIENT
Start: 2020-10-07 | End: 2021-01-05

## 2020-10-07 NOTE — PROGRESS NOTES
Subjective   Marce Williamson is a 85 y.o. female.     Chief Complaint   Patient presents with   • Numbness     right leg   • Bladder Problem       History of Present Illness   Complains of burning and numbness in the right leg.  The numbness extends from the right hip all the way down the leg to the ankle.Has chronic bilateral low back pain and seeing pain management in the past.  Has had some pain in the right knee on the inside aspect that is tender to touch.    Complains of difficulty with urinary frequency and bladder control issues but seems to be chronic.  Wants to be sure is not an infection.    Patient is still living in her home and has improved since I last saw her.  She is now starting to adjust to the loss of her  5 months ago.    The following portions of the patient's history were reviewed and updated as appropriate: allergies, current medications, past family history, past medical history, past social history, past surgical history and problem list.    Depression Screen:  PHQ-2/PHQ-9 Depression Screening 1/28/2020   Little interest or pleasure in doing things 0   Feeling down, depressed, or hopeless 0   Trouble falling or staying asleep, or sleeping too much 3   Feeling tired or having little energy 1   Poor appetite or overeating 3   Feeling bad about yourself - or that you are a failure or have let yourself or your family down 0   Trouble concentrating on things, such as reading the newspaper or watching television 0   Moving or speaking so slowly that other people could have noticed. Or the opposite - being so fidgety or restless that you have been moving around a lot more than usual 0   Thoughts that you would be better off dead, or of hurting yourself in some way 0   Total Score 7       Past Medical History:   Diagnosis Date   • Abdominal cramping    • Abdominal pain, left lateral    • Abnormal CT of the abdomen    • Allergic rhinitis    • Anxiety    • Arthralgia of multiple sites     • Arthritis    • Atrial fibrillation (CMS/HCC)    • Back pain    • BMI 25.0-25.9,adult    • Breast lump    • Calf pain    • Cervical spondylosis    • Constipation    • DDD (degenerative disc disease), lumbar    • Difficulty swallowing    • Diverticulitis of colon    • Dysphagia    • Dysuria    • Easy bruising    • Edema    • Elevated d-dimer    • Episodic tension-type headache, not intractable    • External hemorrhoids    • Fatigue    • Full thickness burn of trunk    • Generalized osteoarthritis of multiple sites    • Headache    • Heart murmur    • Heme positive stool    • Hiatal hernia    • High risk medication use    • History of colonoscopy    • History of echocardiogram 02/22/2019    EF 63% There is fibrocalcific sclerosis of the aortic valve without evidence of aortic stenosis. Mild TR. Trace MR.    • History of leukocytosis    • History of pneumonia     Left lower lobe   • History of rectal bleeding     History of blood per rectum-Abstracted from [employer]   • History of stress test 01/23/2017    Probably normal Lexiscan cardiolite stress. No evidence of significant pharmacologic induced ischemia. No previos infarctions zones. Normal LV systolic function.    • History of transfusion     AFTER HIATAL HERNIA SX   • Hypertension    • Irregular cardiac rhythm    • Irritable bowel syndrome    • Left flank pain    • Left kidney mass    • Left lower quadrant pain    • Localized osteoarthritis of left shoulder    • Low back pain    • Lumbar foraminal stenosis    • Middle ear effusion    • Nausea     Nausea alone-Abstracted from [employer]   • Neural foraminal stenosis of cervical spine    • Nontoxic single thyroid nodule    • Osteopenia    • Other screening mammogram    • Pain in both lower extremities    • Pain in joint of right hip    • Pain in right buttock    • Pain of both hip joints    • Pain of upper extremity    • Polyp of sigmoid colon    • Rectal pain    • Renal cyst, left    • Sciatic pain, right    •  Short of breath on exertion    • Shortness of breath    • Shoulder pain    • Skin lesion    • Swelling of lower extremity    • Throat mass     CYST ON RIGHT SIDE   • Tinnitus, right    • Urinary frequency    • Urinary tract infection    • Vitamin B12 deficiency    • Vitamin C deficiency    • Vulvovaginitis    • Weight loss        Past Surgical History:   Procedure Laterality Date   • ANTERIOR CERVICAL DISCECTOMY W/ FUSION N/A 4/13/2016    Procedure: C-4-C-6 CERVICAL DISCECTOMY ANTERIOR FUSION WITH INSTRUMENTATION;  Surgeon: Blaine Jim DO;  Location: [facility] OR;  Service:    • BACK SURGERY      Dr. Stringer 2001 and Dr. Warren 2002   • CHOLECYSTECTOMY     • COLONOSCOPY      4/6/17 Normal, 3/2013 with diverticuli Dr. Dacosta   • HERNIA REPAIR     • HYSTERECTOMY     • LUMBAR FUSION      L1-L3 fusion   • REPLACEMENT TOTAL KNEE Right    • SHOULDER SURGERY         Family History   Problem Relation Age of Onset   • Heart failure Mother         CONGESTIVE   • Breast cancer Father    • Emphysema Father    • Hypertension Father    • Breast cancer Sister    • Other Sister         POLIO, 1963   • Diabetes Son    • Pancreatic cancer Son    • No Known Problems Other        Social History     Socioeconomic History   • Marital status:      Spouse name: Not on file   • Number of children: Not on file   • Years of education: Not on file   • Highest education level: Not on file   Tobacco Use   • Smoking status: Never Smoker   • Smokeless tobacco: Never Used   Substance and Sexual Activity   • Alcohol use: No   • Drug use: No   • Sexual activity: Defer       Current Outpatient Medications   Medication Sig Dispense Refill   • acebutolol (SECTRAL) 200 MG capsule Take 1 capsule by mouth Daily. 14 capsule 0   • aspirin 81 MG EC tablet Take 81 mg by mouth Daily.     • gabapentin (NEURONTIN) 400 MG capsule Take 2 capsules by mouth 3 (Three) Times a Day. 180 capsule 1   • ibuprofen (ADVIL,MOTRIN) 800 MG tablet   0   • lidocaine  (LIDODERM) 5 % Place 1 patch on the skin as directed by provider Daily. Remove & Discard patch within 12 hours or as directed by MD 15 patch 0   • lisinopril (PRINIVIL,ZESTRIL) 5 MG tablet Take 1 tablet by mouth Daily. take 1 tablet by mouth once daily 90 tablet 1   • lubiprostone (AMITIZA) 8 MCG capsule Take 1 capsule by mouth 2 (Two) Times a Day With Meals. 60 capsule 3   • meloxicam (MOBIC) 15 MG tablet Take 1 tablet by mouth Daily. 90 tablet 1   • omeprazole (priLOSEC) 20 MG capsule Take 1 capsule by mouth Daily. 90 capsule 3   • polyethylene glycol (MIRALAX) powder powder Take 17 g by mouth Daily. 500 g 1   • propafenone (RYTHMOL) 150 MG tablet Take 1 tablet by mouth Every 12 (Twelve) Hours. 60 tablet 5   • risperiDONE (risperDAL) 0.25 MG tablet Take 1 tablet by mouth 2 (Two) Times a Day. 60 tablet 2     No current facility-administered medications for this visit.        Review of Systems   Constitutional: Negative for activity change, appetite change, fatigue, fever, unexpected weight gain and unexpected weight loss.   HENT: Negative for nosebleeds, rhinorrhea, trouble swallowing and voice change.    Eyes: Negative for visual disturbance.   Respiratory: Negative for cough, chest tightness, shortness of breath and wheezing.    Cardiovascular: Negative for chest pain, palpitations and leg swelling.   Gastrointestinal: Negative for abdominal pain, blood in stool, constipation, diarrhea, nausea, vomiting, GERD and indigestion.   Genitourinary: Negative for dysuria, frequency and hematuria.   Musculoskeletal: Positive for back pain. Negative for arthralgias and myalgias.        Right knee pain.   Skin: Negative for rash and bruise.   Neurological: Negative for dizziness, tremors, weakness, light-headedness, numbness, headache and memory problem.        Right leg numbness from the right hip down the leg to the ankle.   Hematological: Negative for adenopathy. Does not bruise/bleed easily.   Psychiatric/Behavioral:  "Negative for sleep disturbance and depressed mood. The patient is not nervous/anxious.        Objective   /78 (BP Location: Left arm, Patient Position: Sitting, Cuff Size: Adult)   Pulse 77   Temp 98.2 °F (36.8 °C) (Temporal)   Ht 167.6 cm (65.98\")   Wt 66.2 kg (146 lb)   SpO2 96%   BMI 23.58 kg/m²     Physical Exam  Vitals signs and nursing note reviewed.   Constitutional:       General: She is not in acute distress.     Appearance: She is well-developed. She is not diaphoretic.      Comments: Patient looks better than she has the last 6 months today.   HENT:      Head: Normocephalic and atraumatic.      Right Ear: External ear normal.      Left Ear: External ear normal.      Nose: Nose normal.   Eyes:      Conjunctiva/sclera: Conjunctivae normal.      Pupils: Pupils are equal, round, and reactive to light.   Neck:      Musculoskeletal: Normal range of motion and neck supple.      Thyroid: No thyromegaly.      Trachea: No tracheal deviation.   Cardiovascular:      Rate and Rhythm: Normal rate and regular rhythm.      Heart sounds: Normal heart sounds. No murmur. No friction rub. No gallop.    Pulmonary:      Effort: Pulmonary effort is normal. No respiratory distress.      Breath sounds: Normal breath sounds.   Abdominal:      General: Bowel sounds are normal.      Palpations: Abdomen is soft. There is no mass.      Tenderness: There is no abdominal tenderness. There is no guarding.   Musculoskeletal: Normal range of motion.      Comments: Right knee with mild swelling and tender tendon palpable on the medial inferior aspect of the right knee with FROM.  Using one point cane.   Lymphadenopathy:      Cervical: No cervical adenopathy.   Skin:     General: Skin is warm and dry.      Capillary Refill: Capillary refill takes less than 2 seconds.      Findings: No rash.      Comments: Right buttock with healed wound and no open lesions found.   Neurological:      Mental Status: She is alert and oriented to " person, place, and time.      Motor: No abnormal muscle tone.      Deep Tendon Reflexes: Reflexes normal.   Psychiatric:         Behavior: Behavior normal.         Thought Content: Thought content normal.         Judgment: Judgment normal.         No results found for this or any previous visit (from the past 2016 hour(s)).  Assessment/Plan   There are no diagnoses linked to this encounter.

## 2020-11-05 ENCOUNTER — TELEPHONE (OUTPATIENT)
Dept: FAMILY MEDICINE CLINIC | Facility: CLINIC | Age: 85
End: 2020-11-05

## 2020-11-05 NOTE — TELEPHONE ENCOUNTER
Patient wants to make sure that Dr Khalil doesn't sign any papers that allows [facility] to put her in a living assistance home or nursing home,,, she wants to stay in her home

## 2020-11-10 ENCOUNTER — OFFICE VISIT (OUTPATIENT)
Dept: FAMILY MEDICINE CLINIC | Facility: CLINIC | Age: 85
End: 2020-11-10

## 2020-11-10 VITALS
SYSTOLIC BLOOD PRESSURE: 132 MMHG | HEART RATE: 77 BPM | BODY MASS INDEX: 23.46 KG/M2 | TEMPERATURE: 98.2 F | DIASTOLIC BLOOD PRESSURE: 64 MMHG | WEIGHT: 146 LBS | HEIGHT: 66 IN | OXYGEN SATURATION: 95 %

## 2020-11-10 DIAGNOSIS — N30.00 ACUTE CYSTITIS WITHOUT HEMATURIA: Primary | ICD-10-CM

## 2020-11-10 DIAGNOSIS — K59.00 CONSTIPATION, UNSPECIFIED CONSTIPATION TYPE: ICD-10-CM

## 2020-11-10 DIAGNOSIS — R30.0 BURNING WITH URINATION: ICD-10-CM

## 2020-11-10 DIAGNOSIS — H91.93 BILATERAL HEARING LOSS, UNSPECIFIED HEARING LOSS TYPE: ICD-10-CM

## 2020-11-10 LAB
BILIRUB BLD-MCNC: NEGATIVE MG/DL
CLARITY, POC: ABNORMAL
COLOR UR: ABNORMAL
GLUCOSE UR STRIP-MCNC: NEGATIVE MG/DL
KETONES UR QL: NEGATIVE
LEUKOCYTE EST, POC: ABNORMAL
NITRITE UR-MCNC: POSITIVE MG/ML
PH UR: 6 [PH] (ref 5–8)
PROT UR STRIP-MCNC: ABNORMAL MG/DL
RBC # UR STRIP: ABNORMAL /UL
SP GR UR: 1.02 (ref 1–1.03)
UROBILINOGEN UR QL: NORMAL

## 2020-11-10 PROCEDURE — 81003 URINALYSIS AUTO W/O SCOPE: CPT | Performed by: INTERNAL MEDICINE

## 2020-11-10 PROCEDURE — 99214 OFFICE O/P EST MOD 30 MIN: CPT | Performed by: INTERNAL MEDICINE

## 2020-11-10 RX ORDER — SULFAMETHOXAZOLE AND TRIMETHOPRIM 800; 160 MG/1; MG/1
1 TABLET ORAL 2 TIMES DAILY
Qty: 20 TABLET | Refills: 0 | Status: SHIPPED | OUTPATIENT
Start: 2020-11-10 | End: 2020-11-12

## 2020-11-10 RX ORDER — NITROFURANTOIN 25; 75 MG/1; MG/1
CAPSULE ORAL
COMMUNITY
End: 2020-11-12 | Stop reason: SDUPTHER

## 2020-11-10 NOTE — PROGRESS NOTES
Subjective   Marce Williamson is a 85 y.o. female.     Chief Complaint   Patient presents with   • burning with urination       History of Present Illness   Patient complains of burning on urination that comes and goes.  Urinary leakage and incontinence.  Denies fever, chills, Nausea or vomiting.  Has chronic constipation.  Has not used any medications or OTC meds.  In past had used the miralax that helped.  Has difficulty with the hearing and states has    The following portions of the patient's history were reviewed and updated as appropriate: allergies, current medications, past family history, past medical history, past social history, past surgical history and problem list.    Depression Screen:  PHQ-2/PHQ-9 Depression Screening 1/28/2020   Little interest or pleasure in doing things 0   Feeling down, depressed, or hopeless 0   Trouble falling or staying asleep, or sleeping too much 3   Feeling tired or having little energy 1   Poor appetite or overeating 3   Feeling bad about yourself - or that you are a failure or have let yourself or your family down 0   Trouble concentrating on things, such as reading the newspaper or watching television 0   Moving or speaking so slowly that other people could have noticed. Or the opposite - being so fidgety or restless that you have been moving around a lot more than usual 0   Thoughts that you would be better off dead, or of hurting yourself in some way 0   Total Score 7       Past Medical History:   Diagnosis Date   • Abdominal cramping    • Abdominal pain, left lateral    • Abnormal CT of the abdomen    • Allergic rhinitis    • Anxiety    • Arthralgia of multiple sites    • Arthritis    • Atrial fibrillation (CMS/HCC)    • Back pain    • BMI 25.0-25.9,adult    • Breast lump    • Calf pain    • Cervical spondylosis    • Constipation    • DDD (degenerative disc disease), lumbar    • Difficulty swallowing    • Diverticulitis of colon    • Dysphagia    • Dysuria    •  Easy bruising    • Edema    • Elevated d-dimer    • Episodic tension-type headache, not intractable    • External hemorrhoids    • Fatigue    • Full thickness burn of trunk    • Generalized osteoarthritis of multiple sites    • Headache    • Heart murmur    • Heme positive stool    • Hiatal hernia    • High risk medication use    • History of colonoscopy    • History of echocardiogram 02/22/2019    EF 63% There is fibrocalcific sclerosis of the aortic valve without evidence of aortic stenosis. Mild TR. Trace MR.    • History of leukocytosis    • History of pneumonia     Left lower lobe   • History of rectal bleeding     History of blood per rectum-Abstracted from [employer]   • History of stress test 01/23/2017    Probably normal Lexiscan cardiolite stress. No evidence of significant pharmacologic induced ischemia. No previos infarctions zones. Normal LV systolic function.    • History of transfusion     AFTER HIATAL HERNIA SX   • Hypertension    • Irregular cardiac rhythm    • Irritable bowel syndrome    • Left flank pain    • Left kidney mass    • Left lower quadrant pain    • Localized osteoarthritis of left shoulder    • Low back pain    • Lumbar foraminal stenosis    • Middle ear effusion    • Nausea     Nausea alone-Abstracted from [employer]   • Neural foraminal stenosis of cervical spine    • Nontoxic single thyroid nodule    • Osteopenia    • Other screening mammogram    • Pain in both lower extremities    • Pain in joint of right hip    • Pain in right buttock    • Pain of both hip joints    • Pain of upper extremity    • Polyp of sigmoid colon    • Rectal pain    • Renal cyst, left    • Sciatic pain, right    • Short of breath on exertion    • Shortness of breath    • Shoulder pain    • Skin lesion    • Swelling of lower extremity    • Throat mass     CYST ON RIGHT SIDE   • Tinnitus, right    • Urinary frequency    • Urinary tract infection    • Vitamin B12 deficiency    • Vitamin C deficiency    •  Vulvovaginitis    • Weight loss        Past Surgical History:   Procedure Laterality Date   • ANTERIOR CERVICAL DISCECTOMY W/ FUSION N/A 4/13/2016    Procedure: C-4-C-6 CERVICAL DISCECTOMY ANTERIOR FUSION WITH INSTRUMENTATION;  Surgeon: Blaine Jim DO;  Location: [facility] OR;  Service:    • BACK SURGERY      Dr. Stringer 2001 and Dr. Warren 2002   • CHOLECYSTECTOMY     • COLONOSCOPY      4/6/17 Normal, 3/2013 with diverticuli Dr. Dacosta   • HERNIA REPAIR     • HYSTERECTOMY     • LUMBAR FUSION      L1-L3 fusion   • REPLACEMENT TOTAL KNEE Right    • SHOULDER SURGERY         Family History   Problem Relation Age of Onset   • Heart failure Mother         CONGESTIVE   • Breast cancer Father    • Emphysema Father    • Hypertension Father    • Breast cancer Sister    • Other Sister         POLIO, 1963   • Diabetes Son    • Pancreatic cancer Son    • No Known Problems Other        Social History     Socioeconomic History   • Marital status:      Spouse name: Not on file   • Number of children: Not on file   • Years of education: Not on file   • Highest education level: Not on file   Tobacco Use   • Smoking status: Never Smoker   • Smokeless tobacco: Never Used   Substance and Sexual Activity   • Alcohol use: No   • Drug use: No   • Sexual activity: Defer       Current Outpatient Medications   Medication Sig Dispense Refill   • acebutolol (SECTRAL) 200 MG capsule Take 1 capsule by mouth Daily. 14 capsule 0   • aspirin 81 MG EC tablet Take 81 mg by mouth Daily.     • gabapentin (NEURONTIN) 400 MG capsule Take 2 capsules by mouth 3 (Three) Times a Day. 180 capsule 1   • lisinopril (PRINIVIL,ZESTRIL) 5 MG tablet Take 1 tablet by mouth Daily. take 1 tablet by mouth once daily 90 tablet 1   • meloxicam (MOBIC) 15 MG tablet Take 1 tablet by mouth Daily. 90 tablet 1   • omeprazole (priLOSEC) 20 MG capsule Take 1 capsule by mouth Daily. 90 capsule 3   • polyethylene glycol (MIRALAX) powder powder Take 17 g by mouth Daily. 500 g  "1   • propafenone (RYTHMOL) 150 MG tablet Take 1 tablet by mouth Every 12 (Twelve) Hours. 60 tablet 5   • lubiprostone (AMITIZA) 8 MCG capsule Take 1 capsule by mouth 2 (Two) Times a Day With Meals. 60 capsule 3   • nitrofurantoin, macrocrystal-monohydrate, (MACROBID) 100 MG capsule nitrofurantoin monohydrate/macrocrystals 100 mg capsule       No current facility-administered medications for this visit.        Review of Systems   Constitutional: Negative for activity change, appetite change, fatigue, fever, unexpected weight gain and unexpected weight loss.   HENT: Positive for hearing loss. Negative for nosebleeds, rhinorrhea, trouble swallowing and voice change.    Eyes: Negative for visual disturbance.   Respiratory: Negative for cough, chest tightness, shortness of breath and wheezing.    Cardiovascular: Negative for chest pain, palpitations and leg swelling.   Gastrointestinal: Positive for constipation. Negative for abdominal pain, blood in stool, diarrhea, nausea, vomiting, GERD and indigestion.   Genitourinary: Positive for dysuria, frequency and urinary incontinence. Negative for hematuria.   Musculoskeletal: Negative for arthralgias, back pain and myalgias.   Skin: Negative for rash and bruise.   Neurological: Negative for dizziness, tremors, weakness, light-headedness, numbness, headache and memory problem.   Hematological: Negative for adenopathy. Does not bruise/bleed easily.   Psychiatric/Behavioral: Negative for sleep disturbance and depressed mood. The patient is not nervous/anxious.        Objective   /64 (BP Location: Left arm, Patient Position: Sitting, Cuff Size: Adult)   Pulse 77   Temp 98.2 °F (36.8 °C) (Temporal)   Ht 167.6 cm (65.98\")   Wt 66.2 kg (146 lb)   SpO2 95%   BMI 23.58 kg/m²     Physical Exam  Vitals signs and nursing note reviewed.   Constitutional:       General: She is not in acute distress.     Appearance: She is well-developed. She is not diaphoretic.   HENT:      " Head: Normocephalic and atraumatic.      Right Ear: External ear normal.      Left Ear: External ear normal.      Ears:      Comments: Significant hearing loss.     Nose: Nose normal.   Eyes:      Conjunctiva/sclera: Conjunctivae normal.      Pupils: Pupils are equal, round, and reactive to light.   Neck:      Musculoskeletal: Normal range of motion and neck supple.      Thyroid: No thyromegaly.      Trachea: No tracheal deviation.   Cardiovascular:      Rate and Rhythm: Normal rate and regular rhythm.      Heart sounds: Normal heart sounds. No murmur. No friction rub. No gallop.    Pulmonary:      Effort: Pulmonary effort is normal. No respiratory distress.      Breath sounds: Normal breath sounds.   Abdominal:      General: Bowel sounds are normal.      Palpations: Abdomen is soft. There is no mass.      Tenderness: There is no abdominal tenderness. There is no guarding.   Genitourinary:     Comments: Mild suprapubic tenderness.  Musculoskeletal: Normal range of motion.      Comments: Wearing soft velcro back brace   Lymphadenopathy:      Cervical: No cervical adenopathy.   Skin:     General: Skin is warm and dry.      Capillary Refill: Capillary refill takes less than 2 seconds.      Findings: No rash.   Neurological:      Mental Status: She is alert and oriented to person, place, and time.      Motor: No abnormal muscle tone.      Deep Tendon Reflexes: Reflexes normal.   Psychiatric:         Behavior: Behavior normal.         Thought Content: Thought content normal.         Judgment: Judgment normal.         Recent Results (from the past 2016 hour(s))   POCT urinalysis dipstick, automated    Collection Time: 10/07/20  3:43 PM    Specimen: Urine   Result Value Ref Range    Color Dark Yellow Yellow, Straw, Dark Yellow, Nilam    Clarity, UA Clear Clear    Specific Gravity  1.025 1.005 - 1.030    pH, Urine 6.0 5.0 - 8.0    Leukocytes Negative Negative    Nitrite, UA Negative Negative    Protein, POC Negative  Negative mg/dL    Glucose, UA Negative Negative, 1000 mg/dL (3+) mg/dL    Ketones, UA Negative Negative    Urobilinogen, UA Normal Normal    Bilirubin Negative Negative    Blood, UA Negative Negative   POCT urinalysis dipstick, automated    Collection Time: 11/10/20  3:05 PM    Specimen: Urine   Result Value Ref Range    Color Straw Yellow, Straw, Dark Yellow, Nilam    Clarity, UA Cloudy (A) Clear    Specific Gravity  1.025 1.005 - 1.030    pH, Urine 6.0 5.0 - 8.0    Leukocytes Moderate (2+) (A) Negative    Nitrite, UA Positive (A) Negative    Protein, POC Trace (A) Negative mg/dL    Glucose, UA Negative Negative, 1000 mg/dL (3+) mg/dL    Ketones, UA Negative Negative    Urobilinogen, UA Normal Normal    Bilirubin Negative Negative    Blood, UA Trace (A) Negative     Assessment/Plan   Diagnoses and all orders for this visit:    1. Acute cystitis without hematuria (Primary)  -     sulfamethoxazole-trimethoprim (BACTRIM DS,SEPTRA DS) 800-160 MG per tablet; Take 1 tablet by mouth 2 (Two) Times a Day.  Dispense: 20 tablet; Refill: 0    2. Burning with urination  -     POCT urinalysis dipstick, automated  -     sulfamethoxazole-trimethoprim (BACTRIM DS,SEPTRA DS) 800-160 MG per tablet; Take 1 tablet by mouth 2 (Two) Times a Day.  Dispense: 20 tablet; Refill: 0    3. Constipation, unspecified constipation type    4. Bilateral hearing loss, unspecified hearing loss type    Discussed with patient repeatedly concerning her UTI which appears to be definitely present today.  Will treat with the sulfamethoxazole trimethoprim as noted above for 10-day course while culturing the urine.  Patient states she has been warm water douching on a regular basis and I recommend that she do not do that anymore as this may be increasing her risk for urinary tract infection.  Patient also with history of chronic constipation which is likely secondary to medication and from her degenerative disc disease spine disorder.  I recommend the use of  MiraLAX available over-the-counter to be used daily and adjust as tolerated.  Patient with hearing loss which was noted insignificant.  I recommend to have audiology evaluation.  Time in room discussing and reviewing with patient while utilizing mask and eye protection for myself and herself of 28 minutes.

## 2020-11-12 DIAGNOSIS — N30.00 ACUTE CYSTITIS WITHOUT HEMATURIA: Primary | ICD-10-CM

## 2020-11-12 DIAGNOSIS — N30.00 ACUTE CYSTITIS WITHOUT HEMATURIA: ICD-10-CM

## 2020-11-12 DIAGNOSIS — K59.00 CONSTIPATION, UNSPECIFIED CONSTIPATION TYPE: Primary | ICD-10-CM

## 2020-11-12 LAB
BACTERIA UR CULT: ABNORMAL
BACTERIA UR CULT: ABNORMAL
OTHER ANTIBIOTIC SUSC ISLT: ABNORMAL

## 2020-11-12 RX ORDER — NITROFURANTOIN 25; 75 MG/1; MG/1
100 CAPSULE ORAL 2 TIMES DAILY
Qty: 20 CAPSULE | Refills: 0 | Status: SHIPPED | OUTPATIENT
Start: 2020-11-12 | End: 2020-12-16

## 2020-11-12 RX ORDER — NITROFURANTOIN 25; 75 MG/1; MG/1
100 CAPSULE ORAL 2 TIMES DAILY
Qty: 20 CAPSULE | Refills: 0 | Status: SHIPPED | OUTPATIENT
Start: 2020-11-12 | End: 2020-11-12 | Stop reason: SDUPTHER

## 2020-11-12 RX ORDER — POLYETHYLENE GLYCOL 3350 17 G/17G
17 POWDER, FOR SOLUTION ORAL DAILY
Qty: 507 G | Refills: 3 | Status: SHIPPED | OUTPATIENT
Start: 2020-11-12 | End: 2021-11-17 | Stop reason: SDUPTHER

## 2020-11-16 DIAGNOSIS — I48.0 PAROXYSMAL ATRIAL FIBRILLATION (HCC): ICD-10-CM

## 2020-11-17 RX ORDER — PROPAFENONE HYDROCHLORIDE 150 MG/1
TABLET, COATED ORAL
Qty: 60 TABLET | Refills: 5 | OUTPATIENT
Start: 2020-11-17

## 2020-11-20 DIAGNOSIS — I48.0 PAROXYSMAL ATRIAL FIBRILLATION (HCC): ICD-10-CM

## 2020-11-20 RX ORDER — PROPAFENONE HYDROCHLORIDE 150 MG/1
150 TABLET, COATED ORAL EVERY 12 HOURS
Qty: 60 TABLET | Refills: 0 | Status: SHIPPED | OUTPATIENT
Start: 2020-11-20 | End: 2021-07-06

## 2020-11-20 NOTE — TELEPHONE ENCOUNTER
"MCK  Pt called needing refills of \"propafenone (RYTHMOL) 150 MG tablet BID (q90 day) sent to [facility] #[id number]   Pt is out of her medication please fill asap at pts request    Pt cb# [phone]     "

## 2020-12-03 ENCOUNTER — TELEPHONE (OUTPATIENT)
Dept: FAMILY MEDICINE CLINIC | Facility: CLINIC | Age: 85
End: 2020-12-03

## 2020-12-03 NOTE — TELEPHONE ENCOUNTER
SON CALLED  INQUIRED ON PATIENTS INSURANCE.  PATIENT UNDER [facility] MEDICARE SUPPLEMENT.  SHE RECEIVED A BILL FOR $144.00.  SON CALLED THE INSURANCE CO.  HE WAS INFORMED THAT DR. SALEH DOES NOT TAKE THIS MEDICARE ADVANTAGE PLAN.  PLEASE ADVICE    CALL BACK #:[phone]

## 2020-12-07 ENCOUNTER — TELEPHONE (OUTPATIENT)
Dept: FAMILY MEDICINE CLINIC | Facility: CLINIC | Age: 85
End: 2020-12-07

## 2020-12-07 DIAGNOSIS — R20.2 PARESTHESIA OF LOWER EXTREMITY: ICD-10-CM

## 2020-12-07 RX ORDER — GABAPENTIN 400 MG/1
800 CAPSULE ORAL 3 TIMES DAILY
Qty: 180 CAPSULE | Refills: 1 | Status: SHIPPED | OUTPATIENT
Start: 2020-12-07 | End: 2021-07-06

## 2020-12-07 NOTE — TELEPHONE ENCOUNTER
Patient called and said she needs the below medication refilled but she has a few left.    [facility] on file  Verified phone #        gabapentin (NEURONTIN) 400 MG capsule [[id number]]     Order Details  Dose: 800 mg Route: Oral Frequency: 3 Times Daily   Dispense Quantity: 180 capsule Refills: 1 Fills remaining: --           Sig: Take 2 capsules by mouth 3 (Three) Times a Day.          Written Date: 07/01/20 Expiration Date: 12/28/20     Start Date: 07/01/20 End Date: --            Ordering Provider: Miki Khalil MD Phone:  [phone] Fax:  [phone]    Address:  [address] [place] [address] NPI:  [id number]

## 2020-12-07 NOTE — TELEPHONE ENCOUNTER
Patient says that now she has completed the Macrobid her rectum is burning, she is having no diarrhea only constipation but when she does have a bowel movement it is mushy

## 2020-12-08 NOTE — TELEPHONE ENCOUNTER
At this time I do not recommend any further antibiotics but I do recommend that she begin taking a probiotic only.  This is available over-the-counter at the pharmacy she just needs as a pharmacist who will direct her to the appropriate area.

## 2020-12-09 DIAGNOSIS — N30.00 ACUTE CYSTITIS WITHOUT HEMATURIA: ICD-10-CM

## 2020-12-10 RX ORDER — NITROFURANTOIN 25; 75 MG/1; MG/1
CAPSULE ORAL
Qty: 20 CAPSULE | Refills: 0 | OUTPATIENT
Start: 2020-12-10

## 2020-12-14 ENCOUNTER — TELEPHONE (OUTPATIENT)
Dept: FAMILY MEDICINE CLINIC | Facility: CLINIC | Age: 85
End: 2020-12-14

## 2020-12-14 NOTE — TELEPHONE ENCOUNTER
Patient called and states her nail came off her big toe on her right foot and it's really red.  She wants to know if a antibiotic could be called in.   Pharmacy is  [facility] on file.  Her number is [phone].

## 2020-12-15 NOTE — TELEPHONE ENCOUNTER
If it is only red where the toenail came off and that is normal.  Now if she is having redness in the rest of her toe, swelling, drainage or discharge then she may need an antibiotic but otherwise she does not.

## 2020-12-16 DIAGNOSIS — N30.00 ACUTE CYSTITIS WITHOUT HEMATURIA: ICD-10-CM

## 2020-12-16 RX ORDER — NITROFURANTOIN 25; 75 MG/1; MG/1
CAPSULE ORAL
Qty: 20 CAPSULE | Refills: 0 | Status: SHIPPED | OUTPATIENT
Start: 2020-12-16 | End: 2021-02-23

## 2020-12-16 NOTE — TELEPHONE ENCOUNTER
She doesn't want the antibiotic for her toe & her toe is doing better with OTC ointment. Patient is requesting the Macrobid 100 mg twice daily

## 2020-12-16 NOTE — TELEPHONE ENCOUNTER
Prescription has been sent to the pharmacy.  She really needs to make sure she is not doing warm water douching which may actually be increasing her risk for urinary tract infection and irritation.  If she is having difficulty with dryness she needs to use the Vagisil which is available over-the-counter.

## 2021-01-05 ENCOUNTER — OFFICE VISIT (OUTPATIENT)
Dept: FAMILY MEDICINE CLINIC | Facility: CLINIC | Age: 86
End: 2021-01-05

## 2021-01-05 VITALS
SYSTOLIC BLOOD PRESSURE: 120 MMHG | DIASTOLIC BLOOD PRESSURE: 74 MMHG | BODY MASS INDEX: 22.5 KG/M2 | WEIGHT: 140 LBS | OXYGEN SATURATION: 95 % | HEIGHT: 66 IN | HEART RATE: 74 BPM | TEMPERATURE: 98.1 F

## 2021-01-05 DIAGNOSIS — K21.9 GASTROESOPHAGEAL REFLUX DISEASE WITHOUT ESOPHAGITIS: ICD-10-CM

## 2021-01-05 DIAGNOSIS — M53.86 SCIATICA OF RIGHT SIDE ASSOCIATED WITH DISORDER OF LUMBAR SPINE: ICD-10-CM

## 2021-01-05 DIAGNOSIS — H90.0 CONDUCTIVE HEARING LOSS, BILATERAL: ICD-10-CM

## 2021-01-05 DIAGNOSIS — I10 ESSENTIAL HYPERTENSION: ICD-10-CM

## 2021-01-05 DIAGNOSIS — G89.4 CHRONIC PAIN SYNDROME: Primary | ICD-10-CM

## 2021-01-05 DIAGNOSIS — M25.50 ARTHRALGIA OF MULTIPLE SITES: ICD-10-CM

## 2021-01-05 DIAGNOSIS — R35.0 URINARY FREQUENCY: ICD-10-CM

## 2021-01-05 DIAGNOSIS — G62.9 NEUROPATHY: ICD-10-CM

## 2021-01-05 PROBLEM — G89.29 CHRONIC PAIN: Status: ACTIVE | Noted: 2021-01-05

## 2021-01-05 LAB
BILIRUB BLD-MCNC: ABNORMAL MG/DL
CLARITY, POC: CLEAR
COLOR UR: YELLOW
GLUCOSE UR STRIP-MCNC: NEGATIVE MG/DL
KETONES UR QL: NEGATIVE
LEUKOCYTE EST, POC: NEGATIVE
NITRITE UR-MCNC: NEGATIVE MG/ML
PH UR: 6 [PH] (ref 5–8)
PROT UR STRIP-MCNC: ABNORMAL MG/DL
RBC # UR STRIP: ABNORMAL /UL
SP GR UR: 1.03 (ref 1–1.03)
UROBILINOGEN UR QL: NORMAL

## 2021-01-05 PROCEDURE — 81003 URINALYSIS AUTO W/O SCOPE: CPT | Performed by: INTERNAL MEDICINE

## 2021-01-05 PROCEDURE — 99215 OFFICE O/P EST HI 40 MIN: CPT | Performed by: INTERNAL MEDICINE

## 2021-01-05 RX ORDER — TIZANIDINE 2 MG/1
2 TABLET ORAL EVERY 8 HOURS PRN
Qty: 90 TABLET | Refills: 2 | Status: ON HOLD | OUTPATIENT
Start: 2021-01-05 | End: 2021-12-31 | Stop reason: SDUPTHER

## 2021-01-05 RX ORDER — LISINOPRIL 5 MG/1
5 TABLET ORAL DAILY
Qty: 90 TABLET | Refills: 1 | Status: ON HOLD | OUTPATIENT
Start: 2021-01-05 | End: 2021-12-31 | Stop reason: SDUPTHER

## 2021-01-05 NOTE — PROGRESS NOTES
Subjective   Marce Williamson is a 85 y.o. female.     Chief Complaint   Patient presents with   • Sciatica   • Pain     under left breast       History of Present Illness   Patient again is here for chronic pains with sciatica and pain going into the rectal area and down her leg right greater than left with burning and numb feeling off and on.  This is not really changed from last visits over the last several years.  In the past she has been going to pain management and has received injections which she states is not helping her.  They have not been giving her pain medications and the only thing she has been taking is been gabapentin 400 mg 2 tablets 3 times a day but states it is upseting her stomach and now is taking 2 tabs 2 times a day.  She is taking the meloxicam 15 mg once a day without any relief.  Refuses to follow back up with pain management.    When she does not take the omeprazole she has burning pain under the left breast and resolves when she take the medication regularly.    She also states that she has difficulties with urination and burning which is not any different than multiple past visits and it comes and goes.  She does have urinary leakage and urinary incontinence which is unchanged.  Denies any fevers, chills, nausea, vomiting.    Complains of difficulty with her vision and needs a new eye doctor, her dentures are loose and falling out of her mouth and has difficulty hearing.  Wants these evaluated.    The following portions of the patient's history were reviewed and updated as appropriate: allergies, current medications, past family history, past medical history, past social history, past surgical history and problem list.    Depression Screen:  PHQ-2/PHQ-9 Depression Screening 1/28/2020   Little interest or pleasure in doing things 0   Feeling down, depressed, or hopeless 0   Trouble falling or staying asleep, or sleeping too much 3   Feeling tired or having little energy 1   Poor  appetite or overeating 3   Feeling bad about yourself - or that you are a failure or have let yourself or your family down 0   Trouble concentrating on things, such as reading the newspaper or watching television 0   Moving or speaking so slowly that other people could have noticed. Or the opposite - being so fidgety or restless that you have been moving around a lot more than usual 0   Thoughts that you would be better off dead, or of hurting yourself in some way 0   Total Score 7       Past Medical History:   Diagnosis Date   • Abdominal cramping    • Abdominal pain, left lateral    • Abnormal CT of the abdomen    • Allergic rhinitis    • Anxiety    • Arthralgia of multiple sites    • Arthritis    • Atrial fibrillation (CMS/HCC)    • Back pain    • BMI 25.0-25.9,adult    • Breast lump    • Calf pain    • Cervical spondylosis    • Constipation    • DDD (degenerative disc disease), lumbar    • Difficulty swallowing    • Diverticulitis of colon    • Dysphagia    • Dysuria    • Easy bruising    • Edema    • Elevated d-dimer    • Episodic tension-type headache, not intractable    • External hemorrhoids    • Fatigue    • Full thickness burn of trunk    • Generalized osteoarthritis of multiple sites    • Headache    • Heart murmur    • Heme positive stool    • Hiatal hernia    • High risk medication use    • History of colonoscopy    • History of echocardiogram 02/22/2019    EF 63% There is fibrocalcific sclerosis of the aortic valve without evidence of aortic stenosis. Mild TR. Trace MR.    • History of leukocytosis    • History of pneumonia     Left lower lobe   • History of rectal bleeding     History of blood per rectum-Abstracted from [facility]   • History of stress test 01/23/2017    Probably normal Lexiscan cardiolite stress. No evidence of significant pharmacologic induced ischemia. No previos infarctions zones. Normal LV systolic function.    • History of transfusion     AFTER HIATAL HERNIA SX   • Hypertension     • Irregular cardiac rhythm    • Irritable bowel syndrome    • Left flank pain    • Left kidney mass    • Left lower quadrant pain    • Localized osteoarthritis of left shoulder    • Low back pain    • Lumbar foraminal stenosis    • Middle ear effusion    • Nausea     Nausea alone-Abstracted from [facility]   • Neural foraminal stenosis of cervical spine    • Nontoxic single thyroid nodule    • Osteopenia    • Other screening mammogram    • Pain in both lower extremities    • Pain in joint of right hip    • Pain in right buttock    • Pain of both hip joints    • Pain of upper extremity    • Polyp of sigmoid colon    • Rectal pain    • Renal cyst, left    • Sciatic pain, right    • Short of breath on exertion    • Shortness of breath    • Shoulder pain    • Skin lesion    • Swelling of lower extremity    • Throat mass     CYST ON RIGHT SIDE   • Tinnitus, right    • Urinary frequency    • Urinary tract infection    • Vitamin B12 deficiency    • Vitamin C deficiency    • Vulvovaginitis    • Weight loss        Past Surgical History:   Procedure Laterality Date   • ANTERIOR CERVICAL DISCECTOMY W/ FUSION N/A 4/13/2016    Procedure: C-4-C-6 CERVICAL DISCECTOMY ANTERIOR FUSION WITH INSTRUMENTATION;  Surgeon: Blaine Jim DO;  Location: [facility];  Service:    • BACK SURGERY      Dr. Stringer 2001 and Dr. Warren 2002   • CHOLECYSTECTOMY     • COLONOSCOPY      4/6/17 Normal, 3/2013 with diverticuli Dr. Dacosta   • HERNIA REPAIR     • HYSTERECTOMY     • LUMBAR FUSION      L1-L3 fusion   • REPLACEMENT TOTAL KNEE Right    • SHOULDER SURGERY         Family History   Problem Relation Age of Onset   • Heart failure Mother         CONGESTIVE   • Breast cancer Father    • Emphysema Father    • Hypertension Father    • Breast cancer Sister    • Other Sister         SHAY, 1963   • Diabetes Son    • Pancreatic cancer Son    • No Known Problems Other        Social History     Socioeconomic History   • Marital status:      Spouse  "name: Not on file   • Number of children: Not on file   • Years of education: Not on file   • Highest education level: Not on file   Tobacco Use   • Smoking status: Never Smoker   • Smokeless tobacco: Never Used   Substance and Sexual Activity   • Alcohol use: No   • Drug use: No   • Sexual activity: Defer       Current Outpatient Medications   Medication Sig Dispense Refill   • acebutolol (SECTRAL) 200 MG capsule Take 1 capsule by mouth Daily. 14 capsule 0   • aspirin 81 MG EC tablet Take 81 mg by mouth Daily.     • gabapentin (NEURONTIN) 400 MG capsule Take 2 capsules by mouth 3 (Three) Times a Day. 180 capsule 1   • lisinopril (PRINIVIL,ZESTRIL) 5 MG tablet Take 1 tablet by mouth Daily. take 1 tablet by mouth once daily 90 tablet 1   • omeprazole (priLOSEC) 20 MG capsule Take 1 capsule by mouth Daily. 90 capsule 3   • propafenone (RYTHMOL) 150 MG tablet Take 1 tablet by mouth Every 12 (Twelve) Hours. 60 tablet 0   • lubiprostone (AMITIZA) 8 MCG capsule Take 1 capsule by mouth 2 (Two) Times a Day With Meals. 60 capsule 3   • nitrofurantoin, macrocrystal-monohydrate, (MACROBID) 100 MG capsule TAKE 1 CAPSULE BY MOUTH TWICE DAILY 20 capsule 0   • polyethylene glycol (MIRALAX) 17 GM/SCOOP powder Take 17 g by mouth Daily. 507 g 3   • tiZANidine (ZANAFLEX) 2 MG tablet Take 1 tablet by mouth Every 8 (Eight) Hours As Needed for Muscle Spasms. 90 tablet 2     No current facility-administered medications for this visit.        Review of Systems   HENT: Positive for hearing loss.    Gastrointestinal: Positive for constipation.   Genitourinary: Positive for dysuria, frequency and urinary incontinence.       Objective   /74 (BP Location: Left arm, Patient Position: Sitting, Cuff Size: Adult)   Pulse 74   Temp 98.1 °F (36.7 °C) (Temporal)   Ht 167.6 cm (65.98\")   Wt 63.5 kg (140 lb)   SpO2 95%   BMI 22.61 kg/m²     Physical Exam  Vitals signs and nursing note reviewed.   Constitutional:       General: She is not in " acute distress.     Appearance: She is well-developed. She is not diaphoretic.   HENT:      Head: Normocephalic and atraumatic.      Right Ear: External ear normal.      Left Ear: External ear normal.      Nose: Nose normal.   Eyes:      Conjunctiva/sclera: Conjunctivae normal.      Pupils: Pupils are equal, round, and reactive to light.   Neck:      Musculoskeletal: Normal range of motion and neck supple.      Thyroid: No thyromegaly.      Trachea: No tracheal deviation.   Cardiovascular:      Rate and Rhythm: Normal rate and regular rhythm.      Heart sounds: Normal heart sounds. No murmur. No friction rub. No gallop.    Pulmonary:      Effort: Pulmonary effort is normal. No respiratory distress.      Breath sounds: Normal breath sounds.   Abdominal:      General: Bowel sounds are normal.      Palpations: Abdomen is soft. There is no mass.      Tenderness: There is no abdominal tenderness. There is no guarding.   Musculoskeletal: Normal range of motion.      Comments: Wearing soft Velcro brace.  Has brought a cane with her utilize as needed.   Lymphadenopathy:      Cervical: No cervical adenopathy.   Skin:     General: Skin is warm and dry.      Capillary Refill: Capillary refill takes less than 2 seconds.      Findings: No rash.   Neurological:      Mental Status: She is alert and oriented to person, place, and time.      Motor: No abnormal muscle tone.      Deep Tendon Reflexes: Reflexes normal.   Psychiatric:         Behavior: Behavior normal.         Thought Content: Thought content normal.         Judgment: Judgment normal.         Recent Results (from the past 2016 hour(s))   POCT urinalysis dipstick, automated    Collection Time: 11/10/20  3:05 PM    Specimen: Urine   Result Value Ref Range    Color Straw Yellow, Straw, Dark Yellow, Nilam    Clarity, UA Cloudy (A) Clear    Specific Gravity  1.025 1.005 - 1.030    pH, Urine 6.0 5.0 - 8.0    Leukocytes Moderate (2+) (A) Negative    Nitrite, UA Positive (A)  Negative    Protein, POC Trace (A) Negative mg/dL    Glucose, UA Negative Negative, 1000 mg/dL (3+) mg/dL    Ketones, UA Negative Negative    Urobilinogen, UA Normal Normal    Bilirubin Negative Negative    Blood, UA Trace (A) Negative   Urine Culture - Urine, Urine, Clean Catch    Collection Time: 11/10/20  4:15 PM    Specimen: Urine, Clean Catch    UR   Result Value Ref Range    Urine Culture Final report (A)     Result 1 Escherichia coli (A)     Susceptibility Testing Comment      Assessment/Plan   Diagnoses and all orders for this visit:    1. Chronic pain syndrome (Primary)  -     tiZANidine (ZANAFLEX) 2 MG tablet; Take 1 tablet by mouth Every 8 (Eight) Hours As Needed for Muscle Spasms.  Dispense: 90 tablet; Refill: 2    2. 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  Dispense: 90 tablet; Refill: 2    3. Neuropathy  -     tiZANidine (ZANAFLEX) 2 MG tablet; Take 1 tablet by mouth Every 8 (Eight) Hours As Needed for Muscle Spasms.  Dispense: 90 tablet; Refill: 2    4. Arthralgia of multiple sites  -     tiZANidine (ZANAFLEX) 2 MG tablet; Take 1 tablet by mouth Every 8 (Eight) Hours As Needed for Muscle Spasms.  Dispense: 90 tablet; Refill: 2    5. Urinary frequency  -     Urine Culture - Urine, Urine, Clean Catch    6. Gastroesophageal reflux disease without esophagitis    7. Essential hypertension  -     lisinopril (PRINIVIL,ZESTRIL) 5 MG tablet; Take 1 tablet by mouth Daily. take 1 tablet by mouth once daily  Dispense: 90 tablet; Refill: 1    8. Conductive hearing loss, bilateral  -     Ambulatory Referral to Audiology      Discussed the chronic pain and chronic sciatica with the back issues.  Will continue to try to avoid narcotic medications and will try tizanidine as needed and given warnings of the medication including fatigue, somnolence, weakness and increased risk of  falls.  Will await the urine culture and if positive then will treat with antibiotic as is indicated and appropriate.  Continue the other medications at this time with controlled blood pressure.    Patient to schedule follow up with cardiology Dr Breaux herself.  Will schedule for audiology evaluation and will need hearing assistance.  She is to contact optometrist herself for evaluation and glasses adjustment.  She is also to contact the dentist for the dentures.    Spent extended time in room in discussion and repeating instructions till patient understood and repeated the instructions back to me.    Time in office/ room in discussion with patient of 42 minutes.          · COVID-19 Precautions - Patient was compliant in wearing a mask. When I saw the patient, I used appropriate personal protective equipment (PPE) including mask and eye shield (standard procedure).  Additionally, I used gown and gloves if indicated.  Hand hygiene was completed before and after seeing the patient.  · Dictated utilizing Dragon Dictation

## 2021-01-06 ENCOUNTER — TELEPHONE (OUTPATIENT)
Dept: FAMILY MEDICINE CLINIC | Facility: CLINIC | Age: 86
End: 2021-01-06

## 2021-01-07 ENCOUNTER — TELEPHONE (OUTPATIENT)
Dept: FAMILY MEDICINE CLINIC | Facility: CLINIC | Age: 86
End: 2021-01-07

## 2021-01-07 DIAGNOSIS — K59.09 CHRONIC CONSTIPATION: ICD-10-CM

## 2021-01-07 LAB
BACTERIA UR CULT: NORMAL
BACTERIA UR CULT: NORMAL

## 2021-01-07 RX ORDER — LUBIPROSTONE 8 UG/1
8 CAPSULE ORAL 2 TIMES DAILY WITH MEALS
Qty: 60 CAPSULE | Refills: 3 | Status: SHIPPED | OUTPATIENT
Start: 2021-01-07 | End: 2021-04-07 | Stop reason: SDUPTHER

## 2021-01-07 NOTE — TELEPHONE ENCOUNTER
Patient should be using her MiraLAX and her Amitiza which she should have at the pharmacy if she does not have it at home.  If she is having new onset weakness in her arm and hand then she needs go to the emergency room otherwise there is not much I can do except for continue use the medications that she has at home.

## 2021-01-07 NOTE — TELEPHONE ENCOUNTER
Patient is wanting something for her constipation, please call her Patient also states that she can't use her right hand & arm along with pain in between her shoulder blade

## 2021-01-08 ENCOUNTER — TELEPHONE (OUTPATIENT)
Dept: FAMILY MEDICINE CLINIC | Facility: CLINIC | Age: 86
End: 2021-01-08

## 2021-01-08 NOTE — TELEPHONE ENCOUNTER
THE PATIENT CALLED IN AND STATED THAT SHE WOULD LIKE A PHONE CALL WHEN THE COVID VACCINE SHOT  IS AVAILABLE.     CALLBACK NUMBER [phone]

## 2021-01-12 NOTE — TELEPHONE ENCOUNTER
Patient states that she doesn't need the ER she needs PT / OT & if possible Home Health  To come & do this. FYI: Patient doesn't recall leaving a message about this

## 2021-01-18 DIAGNOSIS — M75.40 IMPINGEMENT SYNDROME OF SHOULDER REGION, UNSPECIFIED LATERALITY: ICD-10-CM

## 2021-01-18 DIAGNOSIS — G89.29 CHRONIC BILATERAL LOW BACK PAIN, UNSPECIFIED WHETHER SCIATICA PRESENT: Primary | ICD-10-CM

## 2021-01-18 DIAGNOSIS — M54.50 CHRONIC BILATERAL LOW BACK PAIN, UNSPECIFIED WHETHER SCIATICA PRESENT: Primary | ICD-10-CM

## 2021-01-18 DIAGNOSIS — M25.50 ARTHRALGIA OF MULTIPLE SITES: ICD-10-CM

## 2021-01-26 ENCOUNTER — TELEPHONE (OUTPATIENT)
Dept: FAMILY MEDICINE CLINIC | Facility: CLINIC | Age: 86
End: 2021-01-26

## 2021-01-26 NOTE — TELEPHONE ENCOUNTER
Patient is wanting to know about the home health referral they came out & did the evaluation but havent been back

## 2021-01-26 NOTE — TELEPHONE ENCOUNTER
Spoke to home health , they are scheduled to go out for PT tomorrow 1/27/21  I informed patient and she is understanding

## 2021-01-28 ENCOUNTER — TELEPHONE (OUTPATIENT)
Dept: FAMILY MEDICINE CLINIC | Facility: CLINIC | Age: 86
End: 2021-01-28

## 2021-01-28 DIAGNOSIS — M25.541 ARTHRALGIA OF BOTH HANDS: Primary | ICD-10-CM

## 2021-01-28 DIAGNOSIS — M25.542 ARTHRALGIA OF BOTH HANDS: Primary | ICD-10-CM

## 2021-01-28 NOTE — TELEPHONE ENCOUNTER
Markus, PT with [facility] called for 2 reasons.     First wanted the patient to be referred to a orthopedic hand doctor to treat her for arthritis in her hands.  Second to report a level 2 drug interaction between Tizanidine and propafenone.    Markus can be reached at [phone]

## 2021-01-28 NOTE — TELEPHONE ENCOUNTER
Referral for hand surgery has been sent.  Understand the drug level to drug interaction but patient is doing well tolerating at this time will not recommend change at this time

## 2021-01-28 NOTE — TELEPHONE ENCOUNTER
Caller: YUNI    Relationship: [facility]     Best call back number: [phone]    What orders are you requesting (i.e. lab or imaging): OCCUPATIONAL THERAPY 2X WEEK FOR 3 WEEKS        Where will you receive your lab/imaging services: HOME

## 2021-01-29 ENCOUNTER — TELEPHONE (OUTPATIENT)
Dept: FAMILY MEDICINE CLINIC | Facility: CLINIC | Age: 86
End: 2021-01-29

## 2021-02-01 NOTE — TELEPHONE ENCOUNTER
Hand surgery referral has already been entered for the specialist Dr. Lindsey.  At this time I do not recommend any pain medications especially narcotics.  We will see with the hand specialist as.

## 2021-02-02 ENCOUNTER — TELEPHONE (OUTPATIENT)
Dept: FAMILY MEDICINE CLINIC | Facility: CLINIC | Age: 86
End: 2021-02-02

## 2021-02-02 NOTE — TELEPHONE ENCOUNTER
Radha w/ HH called and requested okay for social work to go out. Per Akua ok to verbal for social work to go out. This was relayed to Radha.

## 2021-02-09 PROCEDURE — G0180 MD CERTIFICATION HHA PATIENT: HCPCS | Performed by: INTERNAL MEDICINE

## 2021-02-18 ENCOUNTER — TELEPHONE (OUTPATIENT)
Dept: FAMILY MEDICINE CLINIC | Facility: CLINIC | Age: 86
End: 2021-02-18

## 2021-02-23 ENCOUNTER — OFFICE VISIT (OUTPATIENT)
Dept: FAMILY MEDICINE CLINIC | Facility: CLINIC | Age: 86
End: 2021-02-23

## 2021-02-23 VITALS
HEART RATE: 77 BPM | HEIGHT: 66 IN | WEIGHT: 143 LBS | OXYGEN SATURATION: 97 % | DIASTOLIC BLOOD PRESSURE: 87 MMHG | BODY MASS INDEX: 22.98 KG/M2 | SYSTOLIC BLOOD PRESSURE: 118 MMHG | TEMPERATURE: 97.7 F

## 2021-02-23 DIAGNOSIS — R35.0 URINARY FREQUENCY: Primary | ICD-10-CM

## 2021-02-23 DIAGNOSIS — M79.641 CHRONIC HAND PAIN, RIGHT: ICD-10-CM

## 2021-02-23 DIAGNOSIS — G25.81 RLS (RESTLESS LEGS SYNDROME): ICD-10-CM

## 2021-02-23 DIAGNOSIS — G89.29 CHRONIC HAND PAIN, RIGHT: ICD-10-CM

## 2021-02-23 DIAGNOSIS — G89.4 CHRONIC PAIN SYNDROME: ICD-10-CM

## 2021-02-23 DIAGNOSIS — M25.50 ARTHRALGIA OF MULTIPLE SITES: ICD-10-CM

## 2021-02-23 PROCEDURE — 81003 URINALYSIS AUTO W/O SCOPE: CPT | Performed by: INTERNAL MEDICINE

## 2021-02-23 PROCEDURE — 99214 OFFICE O/P EST MOD 30 MIN: CPT | Performed by: INTERNAL MEDICINE

## 2021-02-23 RX ORDER — ROPINIROLE 0.25 MG/1
0.25 TABLET, FILM COATED ORAL 2 TIMES DAILY
Qty: 60 TABLET | Refills: 3 | Status: SHIPPED | OUTPATIENT
Start: 2021-02-23 | End: 2021-05-17 | Stop reason: SDUPTHER

## 2021-02-23 NOTE — PROGRESS NOTES
Subjective   Marce Williamson is a 86 y.o. female.     Chief Complaint   Patient presents with   • Pain     right hand   • Urinary Frequency       History of Present Illness   Complains of pain in the legs at night from below the knees to the feet.  Seems worse at night and gets some relief with movement and walking.  Has been taking the gabapentin that is upsetting the stomach and not helping the leg pain.    Still having difficulty with urination and occasional burning which is not any different than multiple past visits and it comes and goes.  She does have urinary leakage and urinary incontinence which is unchanged.  Denies any fevers, chills, nausea, vomiting.    Complains of right hand pain and stiffness that has been present for a long time and worsening.  Has tried PT with no relief and difficulty using the right hand and is right hand dominant.    The following portions of the patient's history were reviewed and updated as appropriate: allergies, current medications, past family history, past medical history, past social history, past surgical history and problem list.    Depression Screen:  PHQ-2/PHQ-9 Depression Screening 1/28/2020   Little interest or pleasure in doing things 0   Feeling down, depressed, or hopeless 0   Trouble falling or staying asleep, or sleeping too much 3   Feeling tired or having little energy 1   Poor appetite or overeating 3   Feeling bad about yourself - or that you are a failure or have let yourself or your family down 0   Trouble concentrating on things, such as reading the newspaper or watching television 0   Moving or speaking so slowly that other people could have noticed. Or the opposite - being so fidgety or restless that you have been moving around a lot more than usual 0   Thoughts that you would be better off dead, or of hurting yourself in some way 0   Total Score 7       Past Medical History:   Diagnosis Date   • Abdominal cramping    • Abdominal pain, left  lateral    • Abnormal CT of the abdomen    • Allergic rhinitis    • Anxiety    • Arthralgia of multiple sites    • Arthritis    • Atrial fibrillation (CMS/HCC)    • Back pain    • BMI 25.0-25.9,adult    • Breast lump    • Calf pain    • Cervical spondylosis    • Constipation    • DDD (degenerative disc disease), lumbar    • Difficulty swallowing    • Diverticulitis of colon    • Dysphagia    • Dysuria    • Easy bruising    • Edema    • Elevated d-dimer    • Episodic tension-type headache, not intractable    • External hemorrhoids    • Fatigue    • Full thickness burn of trunk    • Generalized osteoarthritis of multiple sites    • Headache    • Heart murmur    • Heme positive stool    • Hiatal hernia    • High risk medication use    • History of colonoscopy    • History of echocardiogram 02/22/2019    EF 63% There is fibrocalcific sclerosis of the aortic valve without evidence of aortic stenosis. Mild TR. Trace MR.    • History of leukocytosis    • History of pneumonia     Left lower lobe   • History of rectal bleeding     History of blood per rectum-Abstracted from [facility]   • History of stress test 01/23/2017    Probably normal Lexiscan cardiolite stress. No evidence of significant pharmacologic induced ischemia. No previos infarctions zones. Normal LV systolic function.    • History of transfusion     AFTER HIATAL HERNIA SX   • Hypertension    • Irregular cardiac rhythm    • Irritable bowel syndrome    • Left flank pain    • Left kidney mass    • Left lower quadrant pain    • Localized osteoarthritis of left shoulder    • Low back pain    • Lumbar foraminal stenosis    • Middle ear effusion    • Nausea     Nausea alone-Abstracted from [facility]   • Neural foraminal stenosis of cervical spine    • Nontoxic single thyroid nodule    • Osteopenia    • Other screening mammogram    • Pain in both lower extremities    • Pain in joint of right hip    • Pain in right buttock    • Pain of both hip joints    • Pain of  upper extremity    • Polyp of sigmoid colon    • Rectal pain    • Renal cyst, left    • Sciatic pain, right    • Short of breath on exertion    • Shortness of breath    • Shoulder pain    • Skin lesion    • Swelling of lower extremity    • Throat mass     CYST ON RIGHT SIDE   • Tinnitus, right    • Urinary frequency    • Urinary tract infection    • Vitamin B12 deficiency    • Vitamin C deficiency    • Vulvovaginitis    • Weight loss        Past Surgical History:   Procedure Laterality Date   • ANTERIOR CERVICAL DISCECTOMY W/ FUSION N/A 4/13/2016    Procedure: C-4-C-6 CERVICAL DISCECTOMY ANTERIOR FUSION WITH INSTRUMENTATION;  Surgeon: Blaine Jim DO;  [facility];  Service:    • BACK SURGERY      Dr. Stringer 2001 and Dr. Warren 2002   • CHOLECYSTECTOMY     • COLONOSCOPY      4/6/17 Normal, 3/2013 with diverticuli Dr. Dacosta   • HERNIA REPAIR     • HYSTERECTOMY     • LUMBAR FUSION      L1-L3 fusion   • REPLACEMENT TOTAL KNEE Right    • SHOULDER SURGERY         Family History   Problem Relation Age of Onset   • Heart failure Mother         CONGESTIVE   • Breast cancer Father    • Emphysema Father    • Hypertension Father    • Breast cancer Sister    • Other Sister         POLIO, 1963   • Diabetes Son    • Pancreatic cancer Son    • No Known Problems Other        Social History     Socioeconomic History   • Marital status:      Spouse name: Not on file   • Number of children: Not on file   • Years of education: Not on file   • Highest education level: Not on file   Tobacco Use   • Smoking status: Never Smoker   • Smokeless tobacco: Never Used   Substance and Sexual Activity   • Alcohol use: No   • Drug use: No   • Sexual activity: Defer       Current Outpatient Medications   Medication Sig Dispense Refill   • acebutolol (SECTRAL) 200 MG capsule Take 1 capsule by mouth Daily. 14 capsule 0   • aspirin 81 MG EC tablet Take 81 mg by mouth Daily.     • gabapentin (NEURONTIN) 400 MG capsule Take 2 capsules by  mouth 3 (Three) Times a Day. 180 capsule 1   • lisinopril (PRINIVIL,ZESTRIL) 5 MG tablet Take 1 tablet by mouth Daily. take 1 tablet by mouth once daily 90 tablet 1   • lubiprostone (AMITIZA) 8 MCG capsule Take 1 capsule by mouth 2 (Two) Times a Day With Meals. 60 capsule 3   • omeprazole (priLOSEC) 20 MG capsule Take 1 capsule by mouth Daily. 90 capsule 3   • polyethylene glycol (MIRALAX) 17 GM/SCOOP powder Take 17 g by mouth Daily. 507 g 3   • propafenone (RYTHMOL) 150 MG tablet Take 1 tablet by mouth Every 12 (Twelve) Hours. 60 tablet 0   • tiZANidine (ZANAFLEX) 2 MG tablet Take 1 tablet by mouth Every 8 (Eight) Hours As Needed for Muscle Spasms. 90 tablet 2   • rOPINIRole (REQUIP) 0.25 MG tablet Take 1 tablet by mouth 2 (two) times a day. Take 1 hour before bedtime. 60 tablet 3     No current facility-administered medications for this visit.        Review of Systems   Constitutional: Negative for activity change, appetite change, fatigue, fever, unexpected weight gain and unexpected weight loss.   HENT: Negative for nosebleeds, rhinorrhea, trouble swallowing and voice change.    Eyes: Negative for visual disturbance.   Respiratory: Negative for cough, chest tightness, shortness of breath and wheezing.    Cardiovascular: Negative for chest pain, palpitations and leg swelling.   Gastrointestinal: Negative for abdominal pain, blood in stool, constipation, diarrhea, nausea, vomiting, GERD and indigestion.   Genitourinary: Positive for frequency. Negative for dysuria and hematuria.   Musculoskeletal: Positive for arthralgias. Negative for back pain and myalgias.        Leg pain at rest   Skin: Negative for rash and bruise.   Neurological: Negative for dizziness, tremors, weakness, light-headedness, numbness, headache and memory problem.   Hematological: Negative for adenopathy. Does not bruise/bleed easily.   Psychiatric/Behavioral: Negative for sleep disturbance and depressed mood. The patient is not  "nervous/anxious.        Objective   /87 (BP Location: Left arm, Patient Position: Sitting, Cuff Size: Adult)   Pulse 77   Temp 97.7 °F (36.5 °C) (Temporal)   Ht 167.6 cm (65.98\")   Wt 64.9 kg (143 lb)   SpO2 97%   BMI 23.09 kg/m²     Physical Exam  Vitals signs and nursing note reviewed.   Constitutional:       General: She is not in acute distress.     Appearance: She is well-developed. She is not diaphoretic.   HENT:      Head: Normocephalic and atraumatic.      Right Ear: External ear normal.      Left Ear: External ear normal.      Nose: Nose normal.   Eyes:      Conjunctiva/sclera: Conjunctivae normal.      Pupils: Pupils are equal, round, and reactive to light.   Neck:      Musculoskeletal: Normal range of motion and neck supple.      Thyroid: No thyromegaly.      Trachea: No tracheal deviation.   Cardiovascular:      Rate and Rhythm: Normal rate and regular rhythm.      Heart sounds: Normal heart sounds. No murmur. No friction rub. No gallop.    Pulmonary:      Effort: Pulmonary effort is normal. No respiratory distress.      Breath sounds: Normal breath sounds.   Abdominal:      General: Bowel sounds are normal.      Palpations: Abdomen is soft. There is no mass.      Tenderness: There is no abdominal tenderness. There is no guarding.   Musculoskeletal: Normal range of motion.      Comments: Diffuse arthritic changes right greater than left hand and fingers. With decreased range of motion.  Using single point cane.   Lymphadenopathy:      Cervical: No cervical adenopathy.   Skin:     General: Skin is warm and dry.      Capillary Refill: Capillary refill takes less than 2 seconds.      Findings: No rash.   Neurological:      Mental Status: She is alert and oriented to person, place, and time.      Motor: No abnormal muscle tone.      Deep Tendon Reflexes: Reflexes normal.   Psychiatric:         Behavior: Behavior normal.         Thought Content: Thought content normal.         Judgment: Judgment " normal.         Recent Results (from the past 2016 hour(s))   POCT urinalysis dipstick, automated    Collection Time: 01/05/21  3:56 PM    Specimen: Urine   Result Value Ref Range    Color Yellow Yellow, Straw, Dark Yellow, Nilam    Clarity, UA Clear Clear    Specific Gravity  1.030 1.005 - 1.030    pH, Urine 6.0 5.0 - 8.0    Leukocytes Negative Negative    Nitrite, UA Negative Negative    Protein, POC Trace (A) Negative mg/dL    Glucose, UA Negative Negative, 1000 mg/dL (3+) mg/dL    Ketones, UA Negative Negative    Urobilinogen, UA Normal Normal    Bilirubin Small (1+) (A) Negative    Blood, UA Trace (A) Negative   Urine Culture - Urine, Urine, Clean Catch    Collection Time: 01/05/21  4:33 PM    Specimen: Urine, Clean Catch    UC   Result Value Ref Range    Urine Culture Final report     Result 1 Comment    POCT urinalysis dipstick, automated    Collection Time: 02/23/21  2:16 PM    Specimen: Urine   Result Value Ref Range    Color Yellow Yellow, Straw, Dark Yellow, Nilam    Clarity, UA Clear Clear    Specific Gravity  1.020 1.005 - 1.030    pH, Urine 6.0 5.0 - 8.0    Leukocytes Negative Negative    Nitrite, UA Negative Negative    Protein, POC Negative Negative mg/dL    Glucose, UA Negative Negative, 1000 mg/dL (3+) mg/dL    Ketones, UA Negative Negative    Urobilinogen, UA Normal Normal    Bilirubin Negative Negative    Blood, UA Negative Negative     Assessment/Plan   Diagnoses and all orders for this visit:    1. Urinary frequency (Primary)  -     POCT urinalysis dipstick, automated    2. Chronic pain syndrome    3. RLS (restless legs syndrome)  -     rOPINIRole (REQUIP) 0.25 MG tablet; Take 1 tablet by mouth 2 (two) times a day. Take 1 hour before bedtime.  Dispense: 60 tablet; Refill: 3    4. Chronic hand pain, right    5. Arthralgia of multiple sites      Will try adding ropinorole for likely RLS.  If improves then to decrease/wean down and possibly off the gabapentin.  Will again recommend follow up with  the hand specialist for the hand pain/arthritis that has already been ordered.  Again discussed the urinary chronic issue and no sign infection present.  No new treatment recommended.  Continue to follow up with cardiology as scheduled.         · COVID-19 Precautions - Patient was compliant in wearing a mask. When I saw the patient, I used appropriate personal protective equipment (PPE) including mask and eye shield (standard procedure).  Additionally, I used gown and gloves if indicated.  Hand hygiene was completed before and after seeing the patient.  · Dictated utilizing Dragon Dictation

## 2021-02-24 ENCOUNTER — TELEPHONE (OUTPATIENT)
Dept: FAMILY MEDICINE CLINIC | Facility: CLINIC | Age: 86
End: 2021-02-24

## 2021-02-24 NOTE — TELEPHONE ENCOUNTER
Hub can relay message: talked to patient about scheduling a Wellness appointment in April with Dr Khalil. Patient stated she would call back to schedule after checking her calender.

## 2021-03-01 ENCOUNTER — TELEPHONE (OUTPATIENT)
Dept: FAMILY MEDICINE CLINIC | Facility: CLINIC | Age: 86
End: 2021-03-01

## 2021-03-01 NOTE — TELEPHONE ENCOUNTER
Patient states she is still short of breath, the same as she was  during her last appointment.  She wants to know if something could be called in please.  Her pharmacy is [facility] on file.  Her phone number is [phone].

## 2021-03-02 NOTE — TELEPHONE ENCOUNTER
Attempted to call twice but no answer.  At that visit in the office on last visit she did not mention anything about shortness of breath to either myself or the medical assistant Akua Vinson.  Please call patient to clarify more information please.

## 2021-03-02 NOTE — TELEPHONE ENCOUNTER
The shortness of breath is not really a big deal, it mainly happens when she over exerts herself, coming up the stairs (13) from the laundry room is. She does get short of breath when she gets excited or get worked up. She is more concerned with her over active bladder during the day mostly but at night as well. If has to go & is not close to the bathroom then she wets herself. This happens several times a day

## 2021-03-03 NOTE — TELEPHONE ENCOUNTER
No answer on call backs.  We have discussed the overactive bladder multiple times and unfortunate I do not have anything that can help with this.  I believe we have tried to refer her to the urogynecologist as well but have not had any results either.  Otherwise the only thing that is left is wearing pads or pull-ups for the urinary issues.  As for the shortness of breath she has not really mentioned that on any of her previous visits.  Is max be related to age but if she is having significant problems then she probably needs to be evaluated and possibly have her heart looked at by the cardiologist.

## 2021-03-04 ENCOUNTER — TELEPHONE (OUTPATIENT)
Dept: FAMILY MEDICINE CLINIC | Facility: CLINIC | Age: 86
End: 2021-03-04

## 2021-03-04 NOTE — TELEPHONE ENCOUNTER
Patient called back and states she doesn't want to go to the Er.  She says she is going to sit in some warm water and see if it clears up or at least help her stay comfortable till she gets to the [facility]

## 2021-03-04 NOTE — TELEPHONE ENCOUNTER
"Patient was transferred to me , patient is requesting a medication for urinary pain and incontinence. I informed patient that we cannot send in a prescription without being evaluated first. I did also recommend patient to go to the ER or urgent care due to severe pain. Patient is refusing to be evaluated and states \" Dr. Khalil can just send me something in\" I informed her multiple times we cannot send in any medications for urinary issues unless she is evaluated. Patient states she cannot leave her home due to having cats she needs to care for and she has no way to get to the ER. I did mention calling 911 to have EMS take her to ER.   I did call [facility] and get patient an appt with Dr. Lowery at the first appt available on 3/15/21 at 1:45pm.   I called and informed patient of appt and patient then again was demanding something be sent for urinary pain and incontinence. I stressed to the patient again that we cannot do that. Patient got angry and hung up the phone.   "

## 2021-04-07 DIAGNOSIS — K59.09 CHRONIC CONSTIPATION: ICD-10-CM

## 2021-04-07 RX ORDER — LUBIPROSTONE 8 UG/1
8 CAPSULE ORAL 2 TIMES DAILY WITH MEALS
Qty: 60 CAPSULE | Refills: 3 | Status: SHIPPED | OUTPATIENT
Start: 2021-04-07 | End: 2021-09-22 | Stop reason: SDUPTHER

## 2021-04-07 NOTE — TELEPHONE ENCOUNTER
PATIENT IS REQUESTING A REFILL ON lubiprostone (AMITIZA) 8 MCG capsule    [id number] - [place], [place] - [address] AT [facility] [phone] [facility] [phone]

## 2021-04-22 DIAGNOSIS — R06.02 SHORTNESS OF BREATH: ICD-10-CM

## 2021-04-22 DIAGNOSIS — R07.89 CHEST PAIN, ATYPICAL: Primary | ICD-10-CM

## 2021-04-23 ENCOUNTER — TELEPHONE (OUTPATIENT)
Dept: CARDIOLOGY | Facility: CLINIC | Age: 86
End: 2021-04-23

## 2021-04-23 NOTE — TELEPHONE ENCOUNTER
If she will not go to ER, she has an appt may 3rd with dr valdivia can we move that up to next week?

## 2021-04-23 NOTE — TELEPHONE ENCOUNTER
21-Dr Breaux  [facility]  [facility] 1935  Number:   Reason for Call:  Pt. Is having trouble with heart rate being irreg. She states that she was up all night. I told her to go to ER but she is not really wanting to. She wants to get in to see Dr Breaux before May 20th. I told her I would have to ask you because I cant work this appt. In without your permission.

## 2021-04-28 ENCOUNTER — OFFICE VISIT (OUTPATIENT)
Dept: FAMILY MEDICINE CLINIC | Facility: CLINIC | Age: 86
End: 2021-04-28

## 2021-04-28 VITALS
DIASTOLIC BLOOD PRESSURE: 54 MMHG | SYSTOLIC BLOOD PRESSURE: 102 MMHG | OXYGEN SATURATION: 97 % | HEART RATE: 74 BPM | BODY MASS INDEX: 22.98 KG/M2 | TEMPERATURE: 97.7 F | HEIGHT: 66 IN | WEIGHT: 143 LBS

## 2021-04-28 DIAGNOSIS — K59.09 CHRONIC CONSTIPATION: ICD-10-CM

## 2021-04-28 DIAGNOSIS — G62.9 NEUROPATHY: ICD-10-CM

## 2021-04-28 DIAGNOSIS — I10 ESSENTIAL HYPERTENSION: ICD-10-CM

## 2021-04-28 DIAGNOSIS — R35.0 URINARY FREQUENCY: ICD-10-CM

## 2021-04-28 DIAGNOSIS — Z00.00 MEDICARE ANNUAL WELLNESS VISIT, SUBSEQUENT: Primary | ICD-10-CM

## 2021-04-28 PROCEDURE — 81003 URINALYSIS AUTO W/O SCOPE: CPT | Performed by: INTERNAL MEDICINE

## 2021-04-28 PROCEDURE — 1159F MED LIST DOCD IN RCRD: CPT | Performed by: INTERNAL MEDICINE

## 2021-04-28 PROCEDURE — 1170F FXNL STATUS ASSESSED: CPT | Performed by: INTERNAL MEDICINE

## 2021-04-28 PROCEDURE — 99213 OFFICE O/P EST LOW 20 MIN: CPT | Performed by: INTERNAL MEDICINE

## 2021-04-28 PROCEDURE — G0439 PPPS, SUBSEQ VISIT: HCPCS | Performed by: INTERNAL MEDICINE

## 2021-04-28 NOTE — PATIENT INSTRUCTIONS
Medicare Wellness  Personal Prevention Plan of Service     Date of Office Visit:  2021  Encounter Provider:  Miki Khalil MD  Place of Service:  [facility]  Patient Name: Marce Williamson  :  1935    As part of the Medicare Wellness portion of your visit today, we are providing you with this personalized preventive plan of services (PPPS). This plan is based upon recommendations of the [place] Preventive Services Task Force (USPSTF) and the Advisory Committee on Immunization Practices (ACIP).    This lists the preventive care services that should be considered, and provides dates of when you are due. Items listed as completed are up-to-date and do not require any further intervention.    Health Maintenance   Topic Date Due   • ZOSTER VACCINE (2 of 3) 2012   • INFLUENZA VACCINE  2021   • ANNUAL WELLNESS VISIT  2022   • TDAP/TD VACCINES (3 - Tdap) 2029   • COVID-19 Vaccine  Completed   • Pneumococcal Vaccine 65+  Completed   • MAMMOGRAM  Discontinued   • DXA SCAN  Discontinued       Orders Placed This Encounter   Procedures   • Comprehensive Metabolic Panel     Order Specific Question:   Release to patient     Answer:   Immediate   • Lipid Panel   • TSH Rfx On Abnormal To Free T4     Order Specific Question:   Release to patient     Answer:   Immediate   • Vitamin B12     Order Specific Question:   Release to patient     Answer:   Immediate   • POCT urinalysis dipstick, automated     Order Specific Question:   Release to patient     Answer:   Immediate       Return in about 6 months (around 10/28/2021) for Next scheduled follow up.

## 2021-04-28 NOTE — PROGRESS NOTES
Subsequent Medicare Wellness Visit   The ABC's of the Annual Wellness Visit    Chief Complaint   Patient presents with   • Urinary Frequency   • Annual Exam       HPI:  Marce Williamson, -1935, is a 86 y.o. female who presents for a Subsequent Medicare Wellness Visit.  Patient has been having right hand stiffness and pain that has been present for a long time but slowly worsening.  Has tried PT with no relief.  Is right hand dominant.    Still with legs hurting chronically with numbness, burning and pain.  Has known DDD and radiculopathy.  Followed by pain management.    Having difficulty with constipation.  Trying the amitiza but only taking once a day.  Has not been taking twice a day as prescribed.    Still having difficulty with urination and occasional burning which is not any different than multiple past visits and it comes and goes.  She does have urinary leakage and urinary incontinence which is unchanged.  Denies any fevers, chills, nausea, vomiting.    Recent Hospitalizations:  No hospitalization(s) within the last year..    Current Medical Providers:  Patient Care Team:  Miki Khalil MD as PCP - General (Internal Medicine)  Amanda, Jensen Martinez MD as Consulting Physician (Pain Medicine)  eLnny Shin MD as Consulting Physician (Gastroenterology)  Miki Breaux MD as Consulting Physician (Cardiology)  Miguel George MD as Consulting Physician (Neurology)  Chelle Akins MD as Consulting Physician (Orthopedic Surgery)  Blaine Jim DO as Consulting Physician (Orthopedic Surgery)    Health Habits and Functional and Cognitive Screening and Depression Screening:  Functional & Cognitive Status 2021   Do you have difficulty preparing food and eating? No   Do you have difficulty bathing yourself, getting dressed or grooming yourself? No   Do you have difficulty using the toilet? No   Do you have difficulty moving around from place to place? No   Do you have  trouble with steps or getting out of a bed or a chair? No   Current Diet Well Balanced Diet   Dental Exam Up to date   Eye Exam Up to date   Exercise (times per week) 0 times per week   Do you need help using the phone?  No   Are you deaf or do you have serious difficulty hearing?  Yes   Do you need help with transportation? Yes   Do you need help shopping? No   Do you need help preparing meals?  No   Do you need help with housework?  No   Do you need help with laundry? No   Do you need help taking your medications? No   Do you need help managing money? No   Do you ever drive or ride in a car without wearing a seat belt? No   Have you felt unusual stress, anger or loneliness in the last month? No   Who do you live with? Alone   If you need help, do you have trouble finding someone available to you? No   Have you been bothered in the last four weeks by sexual problems? No   Do you have difficulty concentrating, remembering or making decisions? Yes       Compared to one year ago, the patient feels her physical health is the same and her mental health is the same.    Depression Screen:  PHQ-2/PHQ-9 Depression Screening 4/28/2021   Little interest or pleasure in doing things 0   Feeling down, depressed, or hopeless 1   Trouble falling or staying asleep, or sleeping too much 2   Feeling tired or having little energy 0   Poor appetite or overeating 0   Feeling bad about yourself - or that you are a failure or have let yourself or your family down 0   Trouble concentrating on things, such as reading the newspaper or watching television 0   Moving or speaking so slowly that other people could have noticed. Or the opposite - being so fidgety or restless that you have been moving around a lot more than usual 0   Thoughts that you would be better off dead, or of hurting yourself in some way 0   Total Score 3       Falls Risk Assessment:  ISMAEL Fall Risk Clinician Key Questions   Have you fallen in the past year?: No  Do you  feel unsteady with walking?: No  Are you worried about falling?: No      Past Medical/Family/Social History:  The following portions of the patient's history were reviewed and updated as appropriate: allergies, current medications, past family history, past medical history, past social history, past surgical history and problem list.    Allergies   Allergen Reactions   • Keflex  [Cephalexin] Palpitations and Nausea And Vomiting     Amoxicillin is ok   • Metoclopramide Palpitations   • Adhesive Tape Rash   • Pentazocine Unknown - Low Severity     Patient does not know this medication         Current Outpatient Medications:   •  acebutolol (SECTRAL) 200 MG capsule, Take 1 capsule by mouth Daily., Disp: 14 capsule, Rfl: 0  •  aspirin 81 MG EC tablet, Take 81 mg by mouth Daily., Disp: , Rfl:   •  gabapentin (NEURONTIN) 400 MG capsule, Take 2 capsules by mouth 3 (Three) Times a Day., Disp: 180 capsule, Rfl: 1  •  lisinopril (PRINIVIL,ZESTRIL) 5 MG tablet, Take 1 tablet by mouth Daily. take 1 tablet by mouth once daily, Disp: 90 tablet, Rfl: 1  •  lubiprostone (AMITIZA) 8 MCG capsule, Take 1 capsule by mouth 2 (Two) Times a Day With Meals., Disp: 60 capsule, Rfl: 3  •  omeprazole (priLOSEC) 20 MG capsule, Take 1 capsule by mouth Daily., Disp: 90 capsule, Rfl: 3  •  polyethylene glycol (MIRALAX) 17 GM/SCOOP powder, Take 17 g by mouth Daily., Disp: 507 g, Rfl: 3  •  propafenone (RYTHMOL) 150 MG tablet, Take 1 tablet by mouth Every 12 (Twelve) Hours., Disp: 60 tablet, Rfl: 0  •  rOPINIRole (REQUIP) 0.25 MG tablet, Take 1 tablet by mouth 2 (two) times a day. Take 1 hour before bedtime., Disp: 60 tablet, Rfl: 3  •  tiZANidine (ZANAFLEX) 2 MG tablet, Take 1 tablet by mouth Every 8 (Eight) Hours As Needed for Muscle Spasms., Disp: 90 tablet, Rfl: 2    Aspirin use counseling: Taking ASA appropriately as indicated    Followed by pain management.    Family History   Problem Relation Age of Onset   • Heart failure Mother          CONGESTIVE   • Breast cancer Father    • Emphysema Father    • Hypertension Father    • Breast cancer Sister    • Other Sister         POLIO, 1963   • Diabetes Son    • Pancreatic cancer Son    • No Known Problems Other        Social History     Tobacco Use   • Smoking status: Never Smoker   • Smokeless tobacco: Never Used   Substance Use Topics   • Alcohol use: No       Past Surgical History:   Procedure Laterality Date   • ANTERIOR CERVICAL DISCECTOMY W/ FUSION N/A 4/13/2016    Procedure: C-4-C-6 CERVICAL DISCECTOMY ANTERIOR FUSION WITH INSTRUMENTATION;  Surgeon: Blaine Jim DO;  Location: [facility];  Service:    • BACK SURGERY      Dr. Stringer 2001 and Dr. Warren 2002   • CHOLECYSTECTOMY     • COLONOSCOPY      4/6/17 Normal, 3/2013 with diverticuli Dr. Dacosta   • HERNIA REPAIR     • HYSTERECTOMY     • LUMBAR FUSION      L1-L3 fusion   • REPLACEMENT TOTAL KNEE Right    • SHOULDER SURGERY         Patient Active Problem List   Diagnosis   • Coronary arteriosclerosis in native artery   • Chronic coronary artery disease   • Chest pain   • Palpitations   • Hypertension   • Ventricular premature beats   • Shortness of breath   • Disc disorder of cervical region   • Cervical radiculopathy   • Low back pain   • Pain of lower extremity   • Chronic neck pain   • Pain in thoracic spine   • Cough   • Degeneration of intervertebral disc of lumbar region   • Degeneration of intervertebral disc of thoracic region   • Degeneration of intervertebral disc of thoracolumbar region   • Dizziness   • Dysphagia   • Gastritis   • Gastroesophageal reflux disease without esophagitis   • Goiter   • Left arm pain   • Paroxysmal atrial fibrillation (CMS/HCC)   • Degenerative cervical spinal stenosis   • Atrial fibrillation (CMS/HCC)   • Epigastric pain   • Abdominal pain   • Other spondylosis with radiculopathy, cervical region   • Chronic constipation   • Non-specific colitis   • Other symptoms and signs involving the musculoskeletal system    • Diverticulosis of intestine   • Elevated troponin I level   • Flatback syndrome   • Impingement syndrome of shoulder region   • Irritable bowel syndrome   • Left lower quadrant pain   • Nausea   • Paresthesia of lower extremity   • Osteoarthritis of knee   • Pulmonary venous congestion   • Rotator cuff tear arthropathy   • Acquired deformity of spine   • Screening mammogram, encounter for   • History of echocardiogram   • History of stress test   • Neuropathy   • Left-sided chest wall pain   • Pain of left breast   • Urinary tract infection   • Elevated hemoglobin A1c measurement   • Prediabetes   • Mood disorder (CMS/[facility])   • Decubitus ulcer of sacral region, stage 2 (CMS/[facility])   • Medicare annual wellness visit, subsequent   • Arthralgia of multiple sites   • Anxiety   • Urinary frequency   • Continuous leakage of urine   • Wound, open, hip or thigh, right, sequela   • Sciatica of right side associated with disorder of lumbar spine   • Right medial knee pain   • Acute cystitis without hematuria   • Chronic pain   • Dysuria   • Conductive hearing loss, bilateral   • RLS (restless legs syndrome)   • Chronic hand pain, right       Review of Systems   Constitutional: Negative for activity change, appetite change, fatigue, fever, unexpected weight gain and unexpected weight loss.   HENT: Negative for nosebleeds, rhinorrhea, trouble swallowing and voice change.    Eyes: Negative for visual disturbance.   Respiratory: Negative for cough, chest tightness, shortness of breath and wheezing.    Cardiovascular: Negative for chest pain, palpitations and leg swelling.   Gastrointestinal: Positive for constipation. Negative for abdominal pain, blood in stool, diarrhea, nausea, vomiting, GERD and indigestion.   Genitourinary: Positive for frequency. Negative for dysuria and hematuria.   Musculoskeletal: Positive for arthralgias and back pain. Negative for myalgias.   Skin: Negative for rash and bruise.   Neurological: Negative  "for dizziness, tremors, weakness, light-headedness, numbness, headache and memory problem.   Hematological: Negative for adenopathy. Does not bruise/bleed easily.   Psychiatric/Behavioral: Negative for sleep disturbance and depressed mood. The patient is not nervous/anxious.        Objective     Vitals:    04/28/21 1056   BP: 102/54   BP Location: Left arm   Patient Position: Sitting   Cuff Size: Adult   Pulse: 74   Temp: 97.7 °F (36.5 °C)   TempSrc: Temporal   SpO2: 97%   Weight: 64.9 kg (143 lb)   Height: 167.6 cm (65.98\")       Patient's Body mass index is 23.09 kg/m². BMI is within normal parameters. No follow-up required..      No exam data present    The patient has no evidence of cognitve impairment.     Physical Exam  Vitals and nursing note reviewed.   Constitutional:       General: She is not in acute distress.     Appearance: She is well-developed. She is not diaphoretic.   HENT:      Head: Normocephalic and atraumatic.      Right Ear: External ear normal.      Left Ear: External ear normal.      Nose: Nose normal.   Eyes:      Conjunctiva/sclera: Conjunctivae normal.      Pupils: Pupils are equal, round, and reactive to light.   Neck:      Thyroid: No thyromegaly.      Trachea: No tracheal deviation.   Cardiovascular:      Rate and Rhythm: Normal rate and regular rhythm.      Heart sounds: Normal heart sounds. No murmur heard.   No friction rub. No gallop.    Pulmonary:      Effort: Pulmonary effort is normal. No respiratory distress.      Breath sounds: Normal breath sounds.   Abdominal:      General: Bowel sounds are normal.      Palpations: Abdomen is soft. There is no mass.      Tenderness: There is no abdominal tenderness. There is no guarding.   Musculoskeletal:         General: Normal range of motion.      Cervical back: Normal range of motion and neck supple.      Comments: Chronic arthritic changes in the hands and past surgical changes in the back.  Using one point cane.   Lymphadenopathy:      " Cervical: No cervical adenopathy.   Skin:     General: Skin is warm and dry.      Capillary Refill: Capillary refill takes less than 2 seconds.      Findings: No rash.   Neurological:      Mental Status: She is alert and oriented to person, place, and time.      Motor: No abnormal muscle tone.      Deep Tendon Reflexes: Reflexes normal.   Psychiatric:         Behavior: Behavior normal.         Thought Content: Thought content normal.         Judgment: Judgment normal.         Recent Lab Results:  Lab Results   Component Value Date    GLU 97 03/10/2020     Lab Results   Component Value Date    CHOL 160 05/26/2016    TRIG 64 11/05/2019    HDL 49 11/05/2019    VLDL 12.8 11/05/2019    LDLHDL 2.08 05/26/2016       Assessment/Plan   Age-appropriate Screening Schedule:  Refer to the list below for future screening recommendations based on patient's age, sex and/or medical conditions.      Health Maintenance   Topic Date Due   • ZOSTER VACCINE (2 of 3) 02/26/2012   • INFLUENZA VACCINE  08/01/2021   • TDAP/TD VACCINES (3 - Tdap) 11/09/2029   • MAMMOGRAM  Discontinued   • DXA SCAN  Discontinued       Medicare Risks and Personalized Health Plan:  Advance Directive Discussion  Fall Risk  Glaucoma Risk  Immunizations Discussed/Encouraged (specific immunizations; Shingrix )      CMS-Preventive Services Quick Reference  Medicare Preventive Services Addressed:  Annual Wellness Visit (AWV)  Glaucoma screening (for individuals with diabetes mellitus, family history of glaucoma, -Americans (> or =) age 50, -Americans (> or =) age 65)    Advance Care Planning:  ACP discussion was held with the patient during this visit. Patient has an advance directive (not in EMR), copy requested.    Diagnoses and all orders for this visit:    1. Urinary frequency (Primary)  -     POCT urinalysis dipstick, automated    2. Medicare annual wellness visit, subsequent    3. Chronic constipation  -     TSH Rfx On Abnormal To Free T4    4.  Essential hypertension  -     Comprehensive Metabolic Panel  -     Lipid Panel    5. Neuropathy  -     TSH Rfx On Abnormal To Free T4  -     Vitamin B12    Reviewed history and annual wellness visit with patient during office time.  Medications reviewed as appropriate.  Discussed advanced directives and living will.  Patient has living will: Living will: yes and patient will bring copy to office.  Discussed fall risk and precautions encourage removing throw rugs and using grab bars within the home and bathroom.  Will check the labs as ordered above to evaluate the blood sugars, kidney, liver, cholesterol for screening.  Discussed flu shot recommended to get the high-dose influenza vaccine annually in the fall.  Prevnar-13 and pneumovax-23 up to date and appropriate.  Shingrix vaccination series recommended.  Encourage follow-up with the eye doctor on annual basis for glaucoma evaluation.  Discussed weight and encouraged exercise as tolerated while following a healthy diet.   Again discussed the constipation and again recommended to increase the amitiza to twice a day and monitor.  Will check the labs as ordered.  No changes in the medications.  Again discussed the chronic pains, arthritis and neuropathy.  No new treatments currently available.  No changes are needed.  Discussed the chronic urinary issues with no sign of infection in the urine.  No new treatment are needed at this time.    An After Visit Summary and PPPS with all of these plans were given to the patient.      Follow Up:  No follow-ups on file.           · COVID-19 Precautions - Patient was compliant in wearing a mask. When I saw the patient, I used appropriate personal protective equipment (PPE) including mask and eye shield (standard procedure).  Additionally, I used gown and gloves if indicated.  Hand hygiene was completed before and after seeing the patient.  · Dictated utilizing Dragon Dictation

## 2021-04-29 LAB
ALBUMIN SERPL-MCNC: 4.1 G/DL (ref 3.5–5.2)
ALBUMIN/GLOB SERPL: 2.1 G/DL
ALP SERPL-CCNC: 76 U/L (ref 39–117)
ALT SERPL-CCNC: 12 U/L (ref 1–33)
AST SERPL-CCNC: 18 U/L (ref 1–32)
BILIRUB SERPL-MCNC: 0.8 MG/DL (ref 0–1.2)
BUN SERPL-MCNC: 14 MG/DL (ref 8–23)
BUN/CREAT SERPL: 25.5 (ref 7–25)
CALCIUM SERPL-MCNC: 9.2 MG/DL (ref 8.6–10.5)
CHLORIDE SERPL-SCNC: 100 MMOL/L (ref 98–107)
CHOLEST SERPL-MCNC: 140 MG/DL (ref 0–200)
CO2 SERPL-SCNC: 29.5 MMOL/L (ref 22–29)
CREAT SERPL-MCNC: 0.55 MG/DL (ref 0.57–1)
GLOBULIN SER CALC-MCNC: 2 GM/DL
GLUCOSE SERPL-MCNC: 98 MG/DL (ref 65–99)
HDLC SERPL-MCNC: 56 MG/DL (ref 40–60)
LDLC SERPL CALC-MCNC: 73 MG/DL (ref 0–100)
POTASSIUM SERPL-SCNC: 4.5 MMOL/L (ref 3.5–5.2)
PROT SERPL-MCNC: 6.1 G/DL (ref 6–8.5)
SODIUM SERPL-SCNC: 136 MMOL/L (ref 136–145)
TRIGL SERPL-MCNC: 49 MG/DL (ref 0–150)
TSH SERPL DL<=0.005 MIU/L-ACNC: 2.3 UIU/ML (ref 0.27–4.2)
VIT B12 SERPL-MCNC: >2000 PG/ML (ref 211–946)
VLDLC SERPL CALC-MCNC: 11 MG/DL (ref 5–40)

## 2021-05-03 ENCOUNTER — TELEPHONE (OUTPATIENT)
Dept: FAMILY MEDICINE CLINIC | Facility: CLINIC | Age: 86
End: 2021-05-03

## 2021-05-03 DIAGNOSIS — I48.0 PAROXYSMAL ATRIAL FIBRILLATION (HCC): ICD-10-CM

## 2021-05-03 RX ORDER — PROPAFENONE HYDROCHLORIDE 150 MG/1
TABLET, COATED ORAL
Qty: 180 TABLET | OUTPATIENT
Start: 2021-05-03

## 2021-05-10 ENCOUNTER — TELEPHONE (OUTPATIENT)
Dept: CARDIOLOGY | Facility: CLINIC | Age: 86
End: 2021-05-10

## 2021-05-10 NOTE — TELEPHONE ENCOUNTER
JINA PT  PT# [phone]    Mary, pt called this morning at 10:40am. Pt states she is having left arm pain, and pain behind her left breast. I advised pt to present to the ER at [facility], she stated she would if symptoms continue progress, or intensify. Pt was unable to keep last appt as she does not have any transportation. I schd her for 5/13/21 at 1pm, this is essentially her only transportation window. Wanted to advise you of pt call, and the appt time. Thank you

## 2021-05-17 ENCOUNTER — TELEPHONE (OUTPATIENT)
Dept: FAMILY MEDICINE CLINIC | Facility: CLINIC | Age: 86
End: 2021-05-17

## 2021-05-17 DIAGNOSIS — G25.81 RLS (RESTLESS LEGS SYNDROME): ICD-10-CM

## 2021-05-17 RX ORDER — ROPINIROLE 0.25 MG/1
0.25 TABLET, FILM COATED ORAL 2 TIMES DAILY
Qty: 60 TABLET | Refills: 5 | Status: ON HOLD | OUTPATIENT
Start: 2021-05-17 | End: 2021-12-31 | Stop reason: SDUPTHER

## 2021-05-17 NOTE — TELEPHONE ENCOUNTER
Patient called and she is hurting all over and is wanting you to call her something in.    Please advise    Phone # [phone]    Ysabel Rubio

## 2021-05-17 NOTE — TELEPHONE ENCOUNTER
Pt call back and stated she has body aches and is requested a refill on the following medication rOPINIRole (REQUIP) 0.25 MG tablet     [facility] to [facility]

## 2021-05-17 NOTE — TELEPHONE ENCOUNTER
Prescription for the ropinirole has been sent to the pharmacy.  Unfortunately cannot do anything for pain which have been chronic pains.  I do recommend that she follow-up with the pain management doctor.

## 2021-06-01 ENCOUNTER — HOSPITAL ENCOUNTER (OUTPATIENT)
Dept: CARDIOLOGY | Facility: HOSPITAL | Age: 86
End: 2021-06-01

## 2021-06-09 ENCOUNTER — TELEPHONE (OUTPATIENT)
Dept: FAMILY MEDICINE CLINIC | Facility: CLINIC | Age: 86
End: 2021-06-09

## 2021-06-09 NOTE — TELEPHONE ENCOUNTER
PT is upset & not feeling well because she can't get a hold of her son or grandson & wants Dr. Khalil to call Sergey & have him call her. She also wants Dr. Khalil to know that Sergey is her only son & he only comes to see her on Wednesday evenings. She is upset because she has nobody & her cat needs her toe nails cut. She has no one to help her to anything & she does everything alone, she gets some help from her ex-daughter herlinda on occasion

## 2021-06-09 NOTE — TELEPHONE ENCOUNTER
Pt has called back and states she need a sooner appt than 6/29/21. Pt has stomach cramps and diarrhea.    Please call pt    intact/days of the week/months of the year

## 2021-06-11 NOTE — TELEPHONE ENCOUNTER
I called the patient back and she states she was just upset and really does not need to talk potentially talk for another 20 minutes about how upset she is about her son not being able to be there all the time and her  who passed away.

## 2021-06-22 ENCOUNTER — TELEPHONE (OUTPATIENT)
Dept: FAMILY MEDICINE CLINIC | Facility: CLINIC | Age: 86
End: 2021-06-22

## 2021-06-22 NOTE — TELEPHONE ENCOUNTER
Pt called and stated that she hit her rt hand and can not move it, she wants to be seen today if possible. Please note her son can bring her today around 2pm she stated.

## 2021-06-22 NOTE — TELEPHONE ENCOUNTER
No available appointments with Dr. Khalil today so I did recommend patient be seen at . She agrees and will have her son Sergey take her to be evaluated

## 2021-06-29 ENCOUNTER — OFFICE VISIT (OUTPATIENT)
Dept: FAMILY MEDICINE CLINIC | Facility: CLINIC | Age: 86
End: 2021-06-29

## 2021-06-29 VITALS
SYSTOLIC BLOOD PRESSURE: 119 MMHG | DIASTOLIC BLOOD PRESSURE: 61 MMHG | TEMPERATURE: 96.2 F | BODY MASS INDEX: 22.6 KG/M2 | OXYGEN SATURATION: 95 % | HEART RATE: 62 BPM | WEIGHT: 140.6 LBS | HEIGHT: 66 IN

## 2021-06-29 DIAGNOSIS — H90.0 CONDUCTIVE HEARING LOSS, BILATERAL: ICD-10-CM

## 2021-06-29 DIAGNOSIS — R73.03 PREDIABETES: Primary | ICD-10-CM

## 2021-06-29 DIAGNOSIS — I10 ESSENTIAL HYPERTENSION: ICD-10-CM

## 2021-06-29 DIAGNOSIS — I83.813 VARICOSE VEINS OF BOTH LOWER EXTREMITIES WITH PAIN: ICD-10-CM

## 2021-06-29 DIAGNOSIS — R35.0 URINARY FREQUENCY: ICD-10-CM

## 2021-06-29 PROBLEM — I83.93 VARICOSE VEINS OF BOTH LOWER EXTREMITIES: Status: ACTIVE | Noted: 2021-06-29

## 2021-06-29 LAB
BILIRUB BLD-MCNC: NEGATIVE MG/DL
CLARITY, POC: CLEAR
COLOR UR: YELLOW
GLUCOSE UR STRIP-MCNC: NEGATIVE MG/DL
KETONES UR QL: NEGATIVE
LEUKOCYTE EST, POC: NEGATIVE
NITRITE UR-MCNC: NEGATIVE MG/ML
PH UR: 5.5 [PH] (ref 5–8)
PROT UR STRIP-MCNC: NEGATIVE MG/DL
RBC # UR STRIP: NEGATIVE /UL
SP GR UR: 1.03 (ref 1–1.03)
UROBILINOGEN UR QL: NORMAL

## 2021-06-29 PROCEDURE — 81003 URINALYSIS AUTO W/O SCOPE: CPT | Performed by: INTERNAL MEDICINE

## 2021-06-29 PROCEDURE — 99214 OFFICE O/P EST MOD 30 MIN: CPT | Performed by: INTERNAL MEDICINE

## 2021-06-29 RX ORDER — BENZONATATE 100 MG/1
100 CAPSULE ORAL 3 TIMES DAILY PRN
COMMUNITY
End: 2021-09-22

## 2021-06-29 NOTE — PROGRESS NOTES
Subjective   Marce Williamson is a 86 y.o. female.     Chief Complaint   Patient presents with   • leg pain   • Hypertension   • urinary frequency       History of Present Illness   Complains of leg pain, burning and sometimes numbness with some discomfort in the back of the right calf with no swelling.  Has known DDD and radiculopathy and followed by pain management.  Denies any SOA, CP, weakness.  She then states she knows she has some blood sugar medicines at home but she has not been finding it and has not taken any for over 2 months.  However I do not think she has been taking anything for blood sugars.    Then as she is leaving she states that she is having difficulties with hearing which has been a chronic issue but wants to have that evaluated.  Then again as she is leaving again the third time she states that she has been having some shortness of breath which she denied initially.  Upon questioning she states this is not all the times, not related to chest pain, palpitations, weakness, numbness and she has not had any wheezing or coughing or other issues but then states is not bad.  Is difficult to obtain a consistent history from her.    The following portions of the patient's history were reviewed and updated as appropriate: allergies, current medications, past family history, past medical history, past social history, past surgical history and problem list.    Depression Screen:  PHQ-2/PHQ-9 Depression Screening 4/28/2021   Little interest or pleasure in doing things 0   Feeling down, depressed, or hopeless 1   Trouble falling or staying asleep, or sleeping too much 2   Feeling tired or having little energy 0   Poor appetite or overeating 0   Feeling bad about yourself - or that you are a failure or have let yourself or your family down 0   Trouble concentrating on things, such as reading the newspaper or watching television 0   Moving or speaking so slowly that other people could have noticed. Or  the opposite - being so fidgety or restless that you have been moving around a lot more than usual 0   Thoughts that you would be better off dead, or of hurting yourself in some way 0   Total Score 3       Past Medical History:   Diagnosis Date   • Abdominal cramping    • Abdominal pain, left lateral    • Abnormal CT of the abdomen    • Allergic rhinitis    • Anxiety    • Arthralgia of multiple sites    • Arthritis    • Atrial fibrillation (CMS/HCC)    • Back pain    • BMI 25.0-25.9,adult    • Breast lump    • Calf pain    • Cervical spondylosis    • Constipation    • DDD (degenerative disc disease), lumbar    • Difficulty swallowing    • Diverticulitis of colon    • Dysphagia    • Dysuria    • Easy bruising    • Edema    • Elevated d-dimer    • Episodic tension-type headache, not intractable    • External hemorrhoids    • Fatigue    • Full thickness burn of trunk    • Generalized osteoarthritis of multiple sites    • Headache    • Heart murmur    • Heme positive stool    • Hiatal hernia    • High risk medication use    • History of colonoscopy    • History of echocardiogram 02/22/2019    EF 63% There is fibrocalcific sclerosis of the aortic valve without evidence of aortic stenosis. Mild TR. Trace MR.    • History of leukocytosis    • History of pneumonia     Left lower lobe   • History of rectal bleeding     History of blood per rectum-Abstracted from [facility]   • History of stress test 01/23/2017    Probably normal Lexiscan cardiolite stress. No evidence of significant pharmacologic induced ischemia. No previos infarctions zones. Normal LV systolic function.    • History of transfusion     AFTER HIATAL HERNIA SX   • Hypertension    • Irregular cardiac rhythm    • Irritable bowel syndrome    • Left flank pain    • Left kidney mass    • Left lower quadrant pain    • Localized osteoarthritis of left shoulder    • Low back pain    • Lumbar foraminal stenosis    • Middle ear effusion    • Nausea     Nausea  alone-Abstracted from [employer]   • Neural foraminal stenosis of cervical spine    • Nontoxic single thyroid nodule    • Osteopenia    • Other screening mammogram    • Pain in both lower extremities    • Pain in joint of right hip    • Pain in right buttock    • Pain of both hip joints    • Pain of upper extremity    • Polyp of sigmoid colon    • Rectal pain    • Renal cyst, left    • Sciatic pain, right    • Short of breath on exertion    • Shortness of breath    • Shoulder pain    • Skin lesion    • Swelling of lower extremity    • Throat mass     CYST ON RIGHT SIDE   • Tinnitus, right    • Urinary frequency    • Urinary tract infection    • Vitamin B12 deficiency    • Vitamin C deficiency    • Vulvovaginitis    • Weight loss        Past Surgical History:   Procedure Laterality Date   • ANTERIOR CERVICAL DISCECTOMY W/ FUSION N/A 4/13/2016    Procedure: C-4-C-6 CERVICAL DISCECTOMY ANTERIOR FUSION WITH INSTRUMENTATION;  Surgeon: Blaine Jim DO;  Location: [facility];  Service:    • BACK SURGERY      Dr. Stringer 2001 and Dr. Warren 2002   • CHOLECYSTECTOMY     • COLONOSCOPY      4/6/17 Normal, 3/2013 with diverticuli Dr. Dacosta   • HERNIA REPAIR     • HYSTERECTOMY     • LUMBAR FUSION      L1-L3 fusion   • REPLACEMENT TOTAL KNEE Right    • SHOULDER SURGERY         Family History   Problem Relation Age of Onset   • Heart failure Mother         CONGESTIVE   • Breast cancer Father    • Emphysema Father    • Hypertension Father    • Breast cancer Sister    • Other Sister         POLIO, 1963   • Diabetes Son    • Pancreatic cancer Son    • No Known Problems Other        Social History     Socioeconomic History   • Marital status:      Spouse name: Not on file   • Number of children: Not on file   • Years of education: Not on file   • Highest education level: Not on file   Tobacco Use   • Smoking status: Never Smoker   • Smokeless tobacco: Never Used   Substance and Sexual Activity   • Alcohol use: No   • Drug use:  No   • Sexual activity: Defer       Current Outpatient Medications   Medication Sig Dispense Refill   • acebutolol (SECTRAL) 200 MG capsule Take 1 capsule by mouth Daily. 14 capsule 0   • aspirin 81 MG EC tablet Take 81 mg by mouth Daily.     • benzonatate (TESSALON) 100 MG capsule Take 100 mg by mouth 3 (Three) Times a Day As Needed for Cough.     • lisinopril (PRINIVIL,ZESTRIL) 5 MG tablet Take 1 tablet by mouth Daily. take 1 tablet by mouth once daily 90 tablet 1   • lubiprostone (AMITIZA) 8 MCG capsule Take 1 capsule by mouth 2 (Two) Times a Day With Meals. 60 capsule 3   • polyethylene glycol (MIRALAX) 17 GM/SCOOP powder Take 17 g by mouth Daily. 507 g 3   • propafenone (RYTHMOL) 150 MG tablet Take 1 tablet by mouth Every 12 (Twelve) Hours. 60 tablet 0   • rOPINIRole (REQUIP) 0.25 MG tablet Take 1 tablet by mouth 2 (two) times a day. Take 1 hour before bedtime. 60 tablet 5   • gabapentin (NEURONTIN) 400 MG capsule Take 2 capsules by mouth 3 (Three) Times a Day. 180 capsule 1   • omeprazole (priLOSEC) 20 MG capsule Take 1 capsule by mouth Daily. 90 capsule 3   • tiZANidine (ZANAFLEX) 2 MG tablet Take 1 tablet by mouth Every 8 (Eight) Hours As Needed for Muscle Spasms. 90 tablet 2     No current facility-administered medications for this visit.       Review of Systems   Constitutional: Negative for activity change, appetite change, fatigue, fever, unexpected weight gain and unexpected weight loss.   HENT: Negative for nosebleeds, rhinorrhea, trouble swallowing and voice change.    Eyes: Negative for visual disturbance.   Respiratory: Negative for cough, chest tightness, shortness of breath and wheezing.    Cardiovascular: Negative for chest pain, palpitations and leg swelling.   Gastrointestinal: Negative for abdominal pain, blood in stool, constipation, diarrhea, nausea, vomiting, GERD and indigestion.   Genitourinary: Positive for frequency and urinary incontinence. Negative for dysuria and hematuria.  "  Musculoskeletal: Positive for arthralgias and back pain. Negative for myalgias.        Leg pain   Skin: Negative for rash and bruise.   Neurological: Negative for dizziness, tremors, weakness, light-headedness, numbness, headache and memory problem.   Hematological: Negative for adenopathy. Does not bruise/bleed easily.   Psychiatric/Behavioral: Negative for sleep disturbance and depressed mood. The patient is not nervous/anxious.        Objective   /61 (BP Location: Right arm, Patient Position: Sitting)   Pulse 62   Temp 96.2 °F (35.7 °C)   Ht 167.6 cm (65.98\")   Wt 63.8 kg (140 lb 9.6 oz)   SpO2 95%   BMI 22.70 kg/m²     Physical Exam  Vitals and nursing note reviewed.   Constitutional:       General: She is not in acute distress.     Appearance: She is well-developed. She is not diaphoretic.   HENT:      Head: Normocephalic and atraumatic.      Right Ear: External ear normal.      Left Ear: External ear normal.      Nose: Nose normal.   Eyes:      Conjunctiva/sclera: Conjunctivae normal.      Pupils: Pupils are equal, round, and reactive to light.   Neck:      Thyroid: No thyromegaly.      Trachea: No tracheal deviation.   Cardiovascular:      Rate and Rhythm: Normal rate and regular rhythm.      Heart sounds: Normal heart sounds. No murmur heard.   No friction rub. No gallop.       Comments: Varicose veins that are tender but compressable right greater than left calf.  Pulmonary:      Effort: Pulmonary effort is normal. No respiratory distress.      Breath sounds: Normal breath sounds.   Abdominal:      General: Bowel sounds are normal.      Palpations: Abdomen is soft. There is no mass.      Tenderness: There is no abdominal tenderness. There is no guarding.   Musculoskeletal:         General: Normal range of motion.      Cervical back: Normal range of motion and neck supple.      Comments: Mild superficial tenderness in the muscles of the back.   Lymphadenopathy:      Cervical: No cervical " adenopathy.   Skin:     General: Skin is warm and dry.      Capillary Refill: Capillary refill takes less than 2 seconds.      Findings: No rash.   Neurological:      Mental Status: She is alert and oriented to person, place, and time.      Motor: No abnormal muscle tone.      Deep Tendon Reflexes: Reflexes normal.   Psychiatric:         Behavior: Behavior normal.         Thought Content: Thought content normal.         Judgment: Judgment normal.         Recent Results (from the past 2016 hour(s))   POCT urinalysis dipstick, automated    Collection Time: 04/28/21 11:19 AM    Specimen: Urine   Result Value Ref Range    Color Yellow Yellow, Straw, Dark Yellow, Nilam    Clarity, UA Clear Clear    Specific Gravity  1.025 1.005 - 1.030    pH, Urine 6.0 5.0 - 8.0    Leukocytes Negative Negative    Nitrite, UA Negative Negative    Protein, POC Negative Negative mg/dL    Glucose, UA Negative Negative, 1000 mg/dL (3+) mg/dL    Ketones, UA Negative Negative    Urobilinogen, UA Normal Normal    Bilirubin Negative Negative    Blood, UA Negative Negative   Comprehensive Metabolic Panel    Collection Time: 04/28/21 12:20 PM    Specimen: Blood   Result Value Ref Range    Glucose 98 65 - 99 mg/dL    BUN 14 8 - 23 mg/dL    Creatinine 0.55 (L) 0.57 - 1.00 mg/dL    eGFR Non African Am 105 >60 mL/min/1.73    eGFR African Am 127 >60 mL/min/1.73    BUN/Creatinine Ratio 25.5 (H) 7.0 - 25.0    Sodium 136 136 - 145 mmol/L    Potassium 4.5 3.5 - 5.2 mmol/L    Chloride 100 98 - 107 mmol/L    Total CO2 29.5 (H) 22.0 - 29.0 mmol/L    Calcium 9.2 8.6 - 10.5 mg/dL    Total Protein 6.1 6.0 - 8.5 g/dL    Albumin 4.10 3.50 - 5.20 g/dL    Globulin 2.0 gm/dL    A/G Ratio 2.1 g/dL    Total Bilirubin 0.8 0.0 - 1.2 mg/dL    Alkaline Phosphatase 76 39 - 117 U/L    AST (SGOT) 18 1 - 32 U/L    ALT (SGPT) 12 1 - 33 U/L   Lipid Panel    Collection Time: 04/28/21 12:20 PM    Specimen: Blood   Result Value Ref Range    Total Cholesterol 140 0 - 200 mg/dL     Triglycerides 49 0 - 150 mg/dL    HDL Cholesterol 56 40 - 60 mg/dL    VLDL Cholesterol Parmjit 11 5 - 40 mg/dL    LDL Chol Calc (NIH) 73 0 - 100 mg/dL   TSH Rfx On Abnormal To Free T4    Collection Time: 04/28/21 12:20 PM    Specimen: Blood   Result Value Ref Range    TSH 2.300 0.270 - 4.200 uIU/mL   Vitamin B12    Collection Time: 04/28/21 12:20 PM    Specimen: Blood   Result Value Ref Range    Vitamin B-12 >2000 (H) 211 - 946 pg/mL   POC Urinalysis Dipstick, Automated    Collection Time: 06/29/21  3:06 PM    Specimen: Urine   Result Value Ref Range    Color Yellow Yellow, Straw, Dark Yellow, Nilam    Clarity, UA Clear Clear    Specific Gravity  1.030 1.005 - 1.030    pH, Urine 5.5 5.0 - 8.0    Leukocytes Negative Negative    Nitrite, UA Negative Negative    Protein, POC Negative Negative mg/dL    Glucose, UA Negative Negative, 1000 mg/dL (3+) mg/dL    Ketones, UA Negative Negative    Urobilinogen, UA Normal Normal    Bilirubin Negative Negative    Blood, UA Negative Negative     Assessment/Plan   Diagnoses and all orders for this visit:    1. Prediabetes (Primary)  -     Hemoglobin A1c    2. Urinary frequency  -     POC Urinalysis Dipstick, Automated    3. Varicose veins of both lower extremities with pain    4. Essential hypertension    5. Conductive hearing loss, bilateral  -     Ambulatory Referral to Audiology    After initial discussion with patient and evaluation I believe that her pain in her legs is multifactorial secondary to her degenerative disc disease with chronic pain and radiculopathy, varicose veins with dilated vessels causing some discomfort and possibly some malingering by her presentation and symptoms.  She does have some conductive hearing loss and was referred for audiology.  Urinary frequency and issues are chronic and urinalysis is clean today.  No treatment is necessary as this is likely related to age, bladder drop, lumbar issues.  Patient does have history of prediabetes we will check the  A1c but I would not start any medication unless otherwise indicated by an elevated A1c of greater than 7.  Concerning her shortness of breath which she did not initially then said she had but was very mild I do not believe this is cardiac or pulmonary in nature but likely some more anxiety related.  She has persisting issues or worsening symptoms she is to follow-up in the office.           · COVID-19 Precautions - Patient was compliant in wearing a mask. When I saw the patient, I used appropriate personal protective equipment (PPE) including mask and eye shield (standard procedure).  Additionally, I used gown and gloves if indicated.  Hand hygiene was completed before and after seeing the patient.  · Dictated utilizing Dragon Dictation

## 2021-06-30 LAB — HBA1C MFR BLD: 6.2 % (ref 4.8–5.6)

## 2021-07-05 DIAGNOSIS — R20.2 PARESTHESIA OF LOWER EXTREMITY: ICD-10-CM

## 2021-07-05 DIAGNOSIS — I48.0 PAROXYSMAL ATRIAL FIBRILLATION (HCC): ICD-10-CM

## 2021-07-06 RX ORDER — PROPAFENONE HYDROCHLORIDE 150 MG/1
TABLET, COATED ORAL
Qty: 60 TABLET | Refills: 0 | Status: SHIPPED | OUTPATIENT
Start: 2021-07-06 | End: 2021-11-09 | Stop reason: SDUPTHER

## 2021-07-06 RX ORDER — GABAPENTIN 400 MG/1
800 CAPSULE ORAL 3 TIMES DAILY
Qty: 180 CAPSULE | Refills: 0 | Status: SHIPPED | OUTPATIENT
Start: 2021-07-06 | End: 2021-11-08

## 2021-07-30 ENCOUNTER — OFFICE VISIT (OUTPATIENT)
Dept: FAMILY MEDICINE CLINIC | Facility: CLINIC | Age: 86
End: 2021-07-30

## 2021-07-30 VITALS
WEIGHT: 138 LBS | TEMPERATURE: 98.2 F | BODY MASS INDEX: 22.18 KG/M2 | HEIGHT: 66 IN | DIASTOLIC BLOOD PRESSURE: 62 MMHG | HEART RATE: 68 BPM | SYSTOLIC BLOOD PRESSURE: 124 MMHG | OXYGEN SATURATION: 98 %

## 2021-07-30 DIAGNOSIS — R07.2 PRECORDIAL PAIN: Primary | ICD-10-CM

## 2021-07-30 DIAGNOSIS — E04.9 GOITER: ICD-10-CM

## 2021-07-30 PROCEDURE — 93000 ELECTROCARDIOGRAM COMPLETE: CPT | Performed by: NURSE PRACTITIONER

## 2021-07-30 PROCEDURE — 99214 OFFICE O/P EST MOD 30 MIN: CPT | Performed by: NURSE PRACTITIONER

## 2021-07-30 PROCEDURE — 71046 X-RAY EXAM CHEST 2 VIEWS: CPT | Performed by: NURSE PRACTITIONER

## 2021-07-31 LAB
ALBUMIN SERPL-MCNC: 4.1 G/DL (ref 3.5–5.2)
ALBUMIN/GLOB SERPL: 1.9 G/DL
ALP SERPL-CCNC: 82 U/L (ref 39–117)
ALT SERPL-CCNC: 12 U/L (ref 1–33)
AST SERPL-CCNC: 21 U/L (ref 1–32)
BASOPHILS # BLD AUTO: ABNORMAL 10*3/UL
BILIRUB SERPL-MCNC: 0.9 MG/DL (ref 0–1.2)
BUN SERPL-MCNC: 12 MG/DL (ref 8–23)
BUN/CREAT SERPL: 19.4 (ref 7–25)
CALCIUM SERPL-MCNC: 9.2 MG/DL (ref 8.6–10.5)
CHLORIDE SERPL-SCNC: 100 MMOL/L (ref 98–107)
CO2 SERPL-SCNC: 30.2 MMOL/L (ref 22–29)
CREAT SERPL-MCNC: 0.62 MG/DL (ref 0.57–1)
DIFFERENTIAL COMMENT: NORMAL
EOSINOPHIL # BLD AUTO: ABNORMAL 10*3/UL
EOSINOPHIL # BLD MANUAL: 0.2 10*3/MM3 (ref 0–0.4)
EOSINOPHIL NFR BLD AUTO: ABNORMAL %
EOSINOPHIL NFR BLD MANUAL: 4 % (ref 0.3–6.2)
ERYTHROCYTE [DISTWIDTH] IN BLOOD BY AUTOMATED COUNT: 12.2 % (ref 12.3–15.4)
GLOBULIN SER CALC-MCNC: 2.2 GM/DL
GLUCOSE SERPL-MCNC: 122 MG/DL (ref 65–99)
HCT VFR BLD AUTO: 38.9 % (ref 34–46.6)
HGB BLD-MCNC: 12.3 G/DL (ref 12–15.9)
LYMPHOCYTES # BLD AUTO: ABNORMAL 10*3/UL
LYMPHOCYTES # BLD MANUAL: 0.99 10*3/MM3 (ref 0.7–3.1)
LYMPHOCYTES NFR BLD AUTO: ABNORMAL %
LYMPHOCYTES NFR BLD MANUAL: 20.2 % (ref 19.6–45.3)
MCH RBC QN AUTO: 29.1 PG (ref 26.6–33)
MCHC RBC AUTO-ENTMCNC: 31.6 G/DL (ref 31.5–35.7)
MCV RBC AUTO: 92.2 FL (ref 79–97)
MONOCYTES # BLD MANUAL: 0.49 10*3/MM3 (ref 0.1–0.9)
MONOCYTES NFR BLD AUTO: ABNORMAL %
MONOCYTES NFR BLD MANUAL: 10.1 % (ref 5–12)
NEUTROPHILS # BLD MANUAL: 3.22 10*3/MM3 (ref 1.7–7)
NEUTROPHILS NFR BLD AUTO: ABNORMAL %
NEUTROPHILS NFR BLD MANUAL: 65.7 % (ref 42.7–76)
PLATELET # BLD AUTO: 142 10*3/MM3 (ref 140–450)
PLATELET BLD QL SMEAR: NORMAL
POTASSIUM SERPL-SCNC: 4.9 MMOL/L (ref 3.5–5.2)
PROT SERPL-MCNC: 6.3 G/DL (ref 6–8.5)
RBC # BLD AUTO: 4.22 10*6/MM3 (ref 3.77–5.28)
RBC MORPH BLD: NORMAL
SODIUM SERPL-SCNC: 138 MMOL/L (ref 136–145)
TSH SERPL DL<=0.005 MIU/L-ACNC: 1.67 UIU/ML (ref 0.27–4.2)
WBC # BLD AUTO: 4.9 10*3/MM3 (ref 3.4–10.8)

## 2021-08-25 ENCOUNTER — OFFICE VISIT (OUTPATIENT)
Dept: FAMILY MEDICINE CLINIC | Facility: CLINIC | Age: 86
End: 2021-08-25

## 2021-08-25 VITALS
BODY MASS INDEX: 22.85 KG/M2 | DIASTOLIC BLOOD PRESSURE: 82 MMHG | TEMPERATURE: 97.9 F | OXYGEN SATURATION: 96 % | HEART RATE: 78 BPM | HEIGHT: 66 IN | WEIGHT: 142.2 LBS | SYSTOLIC BLOOD PRESSURE: 132 MMHG

## 2021-08-25 DIAGNOSIS — G25.81 RLS (RESTLESS LEGS SYNDROME): ICD-10-CM

## 2021-08-25 DIAGNOSIS — G89.4 CHRONIC PAIN SYNDROME: ICD-10-CM

## 2021-08-25 DIAGNOSIS — I48.0 PAROXYSMAL ATRIAL FIBRILLATION (HCC): ICD-10-CM

## 2021-08-25 DIAGNOSIS — R00.2 PALPITATIONS: ICD-10-CM

## 2021-08-25 DIAGNOSIS — I10 ESSENTIAL HYPERTENSION: Primary | ICD-10-CM

## 2021-08-25 DIAGNOSIS — M47.22 OTHER SPONDYLOSIS WITH RADICULOPATHY, CERVICAL REGION: ICD-10-CM

## 2021-08-25 DIAGNOSIS — M53.86 SCIATICA OF RIGHT SIDE ASSOCIATED WITH DISORDER OF LUMBAR SPINE: ICD-10-CM

## 2021-08-25 PROCEDURE — 99214 OFFICE O/P EST MOD 30 MIN: CPT | Performed by: INTERNAL MEDICINE

## 2021-08-25 RX ORDER — ACEBUTOLOL HYDROCHLORIDE 200 MG/1
200 CAPSULE ORAL DAILY
Qty: 90 CAPSULE | Refills: 1 | Status: SHIPPED | OUTPATIENT
Start: 2021-08-25 | End: 2021-08-30 | Stop reason: SDUPTHER

## 2021-08-25 NOTE — PROGRESS NOTES
Subjective   Marce Williamson is a 86 y.o. female.     Chief Complaint   Patient presents with   • Hypertension   • Back Pain       History of Present Illness       Patient in for follow-up.  She states she has chronic back pain that is unchanging still going down the legs with some numbness from the knees down.  This been present for a long time well over 2 to 3 years.  Has known degenerative disc disease with radiculopathy and has been seen by pain management in the past.  Upon review of the [facility] however she has not had any pain medicine such as hydrocodone since January 30 and gabapentin she has been taking which was last filled on July 6, 2021    Follow-up for hypertension.  Currently, has been feeling well and asymptomatic without any headaches, vision changes, cough, chest pain, shortness of breath, swelling, focal neurologic deficit, memory loss or syncope.  Has not been taking the medications of the lisinopril 5 mg daily faithfully but not sure whyy.  Denies medication side effects and no significant interval events.      Has chronic low back pain and numbness and discomfort in the distal legs that is chronic and unchanged and worse at night.  Not been taking the ropinerole for some reason at night.  When she does take it she feels better.  Currently taking gabapentin but not consistently.      The following portions of the patient's history were reviewed and updated as appropriate: allergies, current medications, past family history, past medical history, past social history, past surgical history and problem list.    Depression Screen:  PHQ-2/PHQ-9 Depression Screening 4/28/2021   Little interest or pleasure in doing things 0   Feeling down, depressed, or hopeless 1   Trouble falling or staying asleep, or sleeping too much 2   Feeling tired or having little energy 0   Poor appetite or overeating 0   Feeling bad about yourself - or that you are a failure or have let yourself or your family down 0    Trouble concentrating on things, such as reading the newspaper or watching television 0   Moving or speaking so slowly that other people could have noticed. Or the opposite - being so fidgety or restless that you have been moving around a lot more than usual 0   Thoughts that you would be better off dead, or of hurting yourself in some way 0   Total Score 3       Past Medical History:   Diagnosis Date   • Abdominal cramping    • Abdominal pain, left lateral    • Abnormal CT of the abdomen    • Allergic rhinitis    • Anxiety    • Arthralgia of multiple sites    • Arthritis    • Atrial fibrillation (CMS/HCC)    • Back pain    • BMI 25.0-25.9,adult    • Breast lump    • Calf pain    • Cervical spondylosis    • Constipation    • DDD (degenerative disc disease), lumbar    • Difficulty swallowing    • Diverticulitis of colon    • Dysphagia    • Dysuria    • Easy bruising    • Edema    • Elevated d-dimer    • Episodic tension-type headache, not intractable    • External hemorrhoids    • Fatigue    • Full thickness burn of trunk    • Generalized osteoarthritis of multiple sites    • Headache    • Heart murmur    • Heme positive stool    • Hiatal hernia    • High risk medication use    • History of colonoscopy    • History of echocardiogram 02/22/2019    EF 63% There is fibrocalcific sclerosis of the aortic valve without evidence of aortic stenosis. Mild TR. Trace MR.    • History of leukocytosis    • History of pneumonia     Left lower lobe   • History of rectal bleeding     History of blood per rectum-Abstracted from [employer]   • History of stress test 01/23/2017    Probably normal Lexiscan cardiolite stress. No evidence of significant pharmacologic induced ischemia. No previos infarctions zones. Normal LV systolic function.    • History of transfusion     AFTER HIATAL HERNIA SX   • Hypertension    • Irregular cardiac rhythm    • Irritable bowel syndrome    • Left flank pain    • Left kidney mass    • Left lower  quadrant pain    • Localized osteoarthritis of left shoulder    • Low back pain    • Lumbar foraminal stenosis    • Middle ear effusion    • Nausea     Nausea alone-Abstracted from [employer]   • Neural foraminal stenosis of cervical spine    • Nontoxic single thyroid nodule    • Osteopenia    • Other screening mammogram    • Pain in both lower extremities    • Pain in joint of right hip    • Pain in right buttock    • Pain of both hip joints    • Pain of upper extremity    • Polyp of sigmoid colon    • Rectal pain    • Renal cyst, left    • Sciatic pain, right    • Short of breath on exertion    • Shortness of breath    • Shoulder pain    • Skin lesion    • Swelling of lower extremity    • Throat mass     CYST ON RIGHT SIDE   • Tinnitus, right    • Urinary frequency    • Urinary tract infection    • Vitamin B12 deficiency    • Vitamin C deficiency    • Vulvovaginitis    • Weight loss        Past Surgical History:   Procedure Laterality Date   • ANTERIOR CERVICAL DISCECTOMY W/ FUSION N/A 4/13/2016    Procedure: C-4-C-6 CERVICAL DISCECTOMY ANTERIOR FUSION WITH INSTRUMENTATION;  Surgeon: Blaine Jim DO;  Location: [facility];  Service:    • BACK SURGERY      Dr. Stringer 2001 and Dr. Warren 2002   • CHOLECYSTECTOMY     • COLONOSCOPY      4/6/17 Normal, 3/2013 with diverticuli Dr. Dacosta   • HERNIA REPAIR     • HYSTERECTOMY     • LUMBAR FUSION      L1-L3 fusion   • REPLACEMENT TOTAL KNEE Right    • SHOULDER SURGERY         Family History   Problem Relation Age of Onset   • Heart failure Mother         CONGESTIVE   • Breast cancer Father    • Emphysema Father    • Hypertension Father    • Breast cancer Sister    • Other Sister         POLIESTEFANY, 1963   • Diabetes Son    • Pancreatic cancer Son    • No Known Problems Other        Social History     Socioeconomic History   • Marital status:      Spouse name: Not on file   • Number of children: Not on file   • Years of education: Not on file   • Highest education level:  Not on file   Tobacco Use   • Smoking status: Never Smoker   • Smokeless tobacco: Never Used   Substance and Sexual Activity   • Alcohol use: No   • Drug use: No   • Sexual activity: Defer       Current Outpatient Medications   Medication Sig Dispense Refill   • acebutolol (SECTRAL) 200 MG capsule Take 1 capsule by mouth Daily. 14 capsule 0   • gabapentin (NEURONTIN) 400 MG capsule Take 2 capsules by mouth 3 (Three) Times a Day. 180 capsule 0   • lisinopril (PRINIVIL,ZESTRIL) 5 MG tablet Take 1 tablet by mouth Daily. take 1 tablet by mouth once daily 90 tablet 1   • propafenone (RYTHMOL) 150 MG tablet TAKE 1 TABLET BY MOUTH TWICE DAILY. 60 tablet 0   • rOPINIRole (REQUIP) 0.25 MG tablet Take 1 tablet by mouth 2 (two) times a day. Take 1 hour before bedtime. 60 tablet 5   • aspirin 81 MG EC tablet Take 81 mg by mouth Daily.     • benzonatate (TESSALON) 100 MG capsule Take 100 mg by mouth 3 (Three) Times a Day As Needed for Cough.     • lubiprostone (AMITIZA) 8 MCG capsule Take 1 capsule by mouth 2 (Two) Times a Day With Meals. 60 capsule 3   • omeprazole (priLOSEC) 20 MG capsule Take 1 capsule by mouth Daily. 90 capsule 3   • polyethylene glycol (MIRALAX) 17 GM/SCOOP powder Take 17 g by mouth Daily. 507 g 3   • tiZANidine (ZANAFLEX) 2 MG tablet Take 1 tablet by mouth Every 8 (Eight) Hours As Needed for Muscle Spasms. 90 tablet 2     No current facility-administered medications for this visit.       Review of Systems   Constitutional: Negative for activity change, appetite change, fatigue, fever, unexpected weight gain and unexpected weight loss.   HENT: Negative for nosebleeds, rhinorrhea, trouble swallowing and voice change.    Eyes: Negative for visual disturbance.   Respiratory: Negative for cough, chest tightness, shortness of breath and wheezing.    Cardiovascular: Negative for chest pain, palpitations and leg swelling.   Gastrointestinal: Negative for abdominal pain, blood in stool, constipation, diarrhea,  "nausea, vomiting, GERD and indigestion.   Genitourinary: Positive for frequency and urinary incontinence. Negative for dysuria and hematuria.   Musculoskeletal: Positive for arthralgias and back pain. Negative for myalgias.        Bilateral leg distal pain.   Skin: Negative for rash and bruise.   Neurological: Positive for numbness. Negative for dizziness, tremors, weakness, light-headedness, headache and memory problem.   Hematological: Negative for adenopathy. Does not bruise/bleed easily.   Psychiatric/Behavioral: Negative for sleep disturbance and depressed mood. The patient is not nervous/anxious.        Objective   /82 (BP Location: Left arm, Patient Position: Sitting, Cuff Size: Adult)   Pulse 78   Temp 97.9 °F (36.6 °C) (Temporal)   Ht 167.6 cm (65.98\")   Wt 64.5 kg (142 lb 3.2 oz)   SpO2 96%   BMI 22.96 kg/m²     Physical Exam  Vitals and nursing note reviewed.   Constitutional:       General: She is not in acute distress.     Appearance: She is well-developed. She is not diaphoretic.   HENT:      Head: Normocephalic and atraumatic.      Right Ear: External ear normal.      Left Ear: External ear normal.      Nose: Nose normal.   Eyes:      Conjunctiva/sclera: Conjunctivae normal.      Pupils: Pupils are equal, round, and reactive to light.   Neck:      Thyroid: No thyromegaly.      Trachea: No tracheal deviation.   Cardiovascular:      Rate and Rhythm: Normal rate and regular rhythm.      Heart sounds: Normal heart sounds. No murmur heard.   No friction rub. No gallop.       Comments: Varicose veins that are tender but compressable right greater than left calf.  Pulmonary:      Effort: Pulmonary effort is normal. No respiratory distress.      Breath sounds: Normal breath sounds.   Abdominal:      General: Bowel sounds are normal.      Palpations: Abdomen is soft. There is no mass.      Tenderness: There is no abdominal tenderness. There is no guarding.   Musculoskeletal:         General: Normal " range of motion.      Cervical back: Normal range of motion and neck supple.      Comments: Using cane.  Prominent spinal processes with post surgical changes of the lumbar spine chronically.   Lymphadenopathy:      Cervical: No cervical adenopathy.   Skin:     General: Skin is warm and dry.      Capillary Refill: Capillary refill takes less than 2 seconds.      Findings: No rash.   Neurological:      Mental Status: She is alert and oriented to person, place, and time.      Motor: No abnormal muscle tone.      Deep Tendon Reflexes: Reflexes normal.   Psychiatric:         Behavior: Behavior normal.         Thought Content: Thought content normal.         Judgment: Judgment normal.         Recent Results (from the past 2016 hour(s))   POC Urinalysis Dipstick, Automated    Collection Time: 06/29/21  3:06 PM    Specimen: Urine   Result Value Ref Range    Color Yellow Yellow, Straw, Dark Yellow, Nilam    Clarity, UA Clear Clear    Specific Gravity  1.030 1.005 - 1.030    pH, Urine 5.5 5.0 - 8.0    Leukocytes Negative Negative    Nitrite, UA Negative Negative    Protein, POC Negative Negative mg/dL    Glucose, UA Negative Negative, 1000 mg/dL (3+) mg/dL    Ketones, UA Negative Negative    Urobilinogen, UA Normal Normal    Bilirubin Negative Negative    Blood, UA Negative Negative   Hemoglobin A1c    Collection Time: 06/29/21  3:09 PM    Specimen: Blood   Result Value Ref Range    Hemoglobin A1C 6.20 (H) 4.80 - 5.60 %   CBC & Differential    Collection Time: 07/30/21  2:13 PM    Specimen: Blood   Result Value Ref Range    WBC 4.90 3.40 - 10.80 10*3/mm3    RBC 4.22 3.77 - 5.28 10*6/mm3    Hemoglobin 12.3 12.0 - 15.9 g/dL    Hematocrit 38.9 34.0 - 46.6 %    MCV 92.2 79.0 - 97.0 fL    MCH 29.1 26.6 - 33.0 pg    MCHC 31.6 31.5 - 35.7 g/dL    RDW 12.2 (L) 12.3 - 15.4 %    Platelets 142 140 - 450 10*3/mm3    Neutrophil Rel % CANCELED     Lymphocyte Rel % CANCELED     Monocyte Rel % CANCELED     Eosinophil Rel % CANCELED      Lymphocytes Absolute CANCELED     Eosinophils Absolute CANCELED     Basophils Absolute CANCELED    Comprehensive Metabolic Panel    Collection Time: 07/30/21  2:13 PM    Specimen: Blood   Result Value Ref Range    Glucose 122 (H) 65 - 99 mg/dL    BUN 12 8 - 23 mg/dL    Creatinine 0.62 0.57 - 1.00 mg/dL    eGFR Non African Am 91 >60 mL/min/1.73    eGFR African Am 111 >60 mL/min/1.73    BUN/Creatinine Ratio 19.4 7.0 - 25.0    Sodium 138 136 - 145 mmol/L    Potassium 4.9 3.5 - 5.2 mmol/L    Chloride 100 98 - 107 mmol/L    Total CO2 30.2 (H) 22.0 - 29.0 mmol/L    Calcium 9.2 8.6 - 10.5 mg/dL    Total Protein 6.3 6.0 - 8.5 g/dL    Albumin 4.10 3.50 - 5.20 g/dL    Globulin 2.2 gm/dL    A/G Ratio 1.9 g/dL    Total Bilirubin 0.9 0.0 - 1.2 mg/dL    Alkaline Phosphatase 82 39 - 117 U/L    AST (SGOT) 21 1 - 32 U/L    ALT (SGPT) 12 1 - 33 U/L   TSH Rfx On Abnormal To Free T4    Collection Time: 07/30/21  2:13 PM    Specimen: Blood   Result Value Ref Range    TSH 1.670 0.270 - 4.200 uIU/mL   Manual Differential    Collection Time: 07/30/21  2:13 PM   Result Value Ref Range    Neutrophil Rel % 65.7 42.7 - 76.0 %    Lymphocyte Rel % 20.2 19.6 - 45.3 %    Monocyte Rel % 10.1 5.0 - 12.0 %    Eosinophil Rel % 4.0 0.3 - 6.2 %    Neutrophils Absolute 3.22 1.70 - 7.00 10*3/mm3    Lymphocytes Absolute 0.99 0.70 - 3.10 10*3/mm3    Monocytes Absolute 0.49 0.10 - 0.90 10*3/mm3    Eosinophil Abs 0.20 0.00 - 0.40 10*3/mm3    Differential Comment Comment     Comment Comment     Plt Comment Comment      Assessment/Plan   Diagnoses and all orders for this visit:    1. Essential hypertension (Primary)    2. Paroxysmal atrial fibrillation (CMS/HCC)    3. Palpitations    4. RLS (restless legs syndrome)    5. Sciatica of right side associated with disorder of lumbar spine    6. Other spondylosis with radiculopathy, cervical region    7. Chronic pain syndrome    Hypertension controlled.  Continue the current medications unchanged and recommended to  go back on the lisinopril 5 mg daily.  Afib stable and paroxysmal.  Continue the current medications with very few palpitations.  RLS recommend to take the ropinorole at night.  Continue the gabapentin unchanged.  FILOMENA run and reviewed.  Risks of the medication include but are not limited to fatigue, somnolence, increased risk of falls, constipation, allergic reaction, dependence, and addiction.      I spent 35 minutes caring for Marce on this date of service. This time includes time spent by me in the following activities:preparing for the visit, obtaining and/or reviewing a separately obtained history, performing a medically appropriate examination and/or evaluation , ordering medications, tests, or procedures and documenting information in the medical record     · COVID-19 Precautions - Patient was compliant in wearing a mask. When I saw the patient, I used appropriate personal protective equipment (PPE) including mask and eye shield (standard procedure).  Additionally, I used gown and gloves if indicated.  Hand hygiene was completed before and after seeing the patient.  · Dictated utilizing Dragon Dictation

## 2021-08-27 ENCOUNTER — TELEPHONE (OUTPATIENT)
Dept: FAMILY MEDICINE CLINIC | Facility: CLINIC | Age: 86
End: 2021-08-27

## 2021-08-27 NOTE — TELEPHONE ENCOUNTER
Wanting something for her nerves, Sergey is being mean to her, called her a bi**h, is wanting her to move into a nursing home,,, and just talking to Dr Khalil will make her feel better

## 2021-08-30 ENCOUNTER — TELEPHONE (OUTPATIENT)
Dept: FAMILY MEDICINE CLINIC | Facility: CLINIC | Age: 86
End: 2021-08-30

## 2021-08-30 DIAGNOSIS — I48.0 PAROXYSMAL ATRIAL FIBRILLATION (HCC): ICD-10-CM

## 2021-08-30 DIAGNOSIS — R00.2 PALPITATIONS: ICD-10-CM

## 2021-08-30 RX ORDER — ACEBUTOLOL HYDROCHLORIDE 200 MG/1
200 CAPSULE ORAL DAILY
Qty: 90 CAPSULE | Refills: 1 | Status: ON HOLD | OUTPATIENT
Start: 2021-08-30 | End: 2021-12-31 | Stop reason: SDUPTHER

## 2021-08-30 NOTE — TELEPHONE ENCOUNTER
Patient called and states her legs have been burning like fire all the way down to her feet and she hasn't slept for two days.  She doesn't know what to do. She says she can't get a hold of her son because he has his phone cut off. She thinks its from the nerve damage in her back.  Her phone number is [phone].

## 2021-08-30 NOTE — TELEPHONE ENCOUNTER
Patient called back regarding her legs and requesting medication to help them feel better.  Also, she just realized the acebutolol was called in to [facility].  She said her medications always go to [facility] at [address] [place] [address] and doesn't understand why it was sent to [facility].  She wants to make sure anything sent in for her legs go to [facility].

## 2021-08-30 NOTE — TELEPHONE ENCOUNTER
OK FOR HUB     [facility] the acebutolol that was called in is for her Afib. I resent this to the [facility] pharmacy. If patient is needing something for pain she will need to reach out to Dr. Dias office which is her pain specialist , his number is [phone]

## 2021-08-30 NOTE — TELEPHONE ENCOUNTER
Patient called back regarding her legs.  Also, she called and states Dr Khalil called in some medication for her and the pharmacy said this morning they never received anything.  She thinks it the Acebutolol that was called in on 08/25.

## 2021-09-08 ENCOUNTER — TELEPHONE (OUTPATIENT)
Dept: FAMILY MEDICINE CLINIC | Facility: CLINIC | Age: 86
End: 2021-09-08

## 2021-09-08 NOTE — TELEPHONE ENCOUNTER
Attempted to reach patient, she is needing prescription to be filled but this is prescribed by Dr. Breaux

## 2021-09-08 NOTE — TELEPHONE ENCOUNTER
Patient asking for refill on        propafenone (RYTHMOL) 150 MG tablet     Needs to go to NEW pharmacy       [facility]  [address] , [place] [id number] [phone]        Patient contact info [phone]

## 2021-09-13 ENCOUNTER — TELEPHONE (OUTPATIENT)
Dept: FAMILY MEDICINE CLINIC | Facility: CLINIC | Age: 86
End: 2021-09-13

## 2021-09-13 DIAGNOSIS — R00.2 PALPITATIONS: Primary | ICD-10-CM

## 2021-09-13 DIAGNOSIS — I25.10 CHRONIC CORONARY ARTERY DISEASE: ICD-10-CM

## 2021-09-13 DIAGNOSIS — I48.0 PAROXYSMAL ATRIAL FIBRILLATION (HCC): ICD-10-CM

## 2021-09-13 NOTE — TELEPHONE ENCOUNTER
Patient provider from [facility] asking for verbal/ cardio referral      Dr Locke    [phone]

## 2021-09-14 ENCOUNTER — TELEPHONE (OUTPATIENT)
Dept: FAMILY MEDICINE CLINIC | Facility: CLINIC | Age: 86
End: 2021-09-14

## 2021-09-14 NOTE — TELEPHONE ENCOUNTER
Pt called in stating painful and frequent urination.    -Pt states has had medication for this previously    -Currently has apt for 09/22      Pt contact [phone]

## 2021-09-14 NOTE — TELEPHONE ENCOUNTER
Patient called back and wanted to remind you to send any medication to [facility] on [address] please.

## 2021-09-15 DIAGNOSIS — R35.0 URINARY FREQUENCY: Primary | ICD-10-CM

## 2021-09-15 DIAGNOSIS — R30.0 DYSURIA: ICD-10-CM

## 2021-09-15 NOTE — TELEPHONE ENCOUNTER
OK FOR HUB  Attempted to reach patient, never went to  just kept ringing.     Please read below She does not need a office visit just to leave a urine

## 2021-09-15 NOTE — TELEPHONE ENCOUNTER
This I do not believe is a new issue however we can go ahead and check a urine culture to be sure if there is infection what we are treating.  Order has been entered.

## 2021-09-22 ENCOUNTER — OFFICE VISIT (OUTPATIENT)
Dept: FAMILY MEDICINE CLINIC | Facility: CLINIC | Age: 86
End: 2021-09-22

## 2021-09-22 ENCOUNTER — TELEPHONE (OUTPATIENT)
Dept: FAMILY MEDICINE CLINIC | Facility: CLINIC | Age: 86
End: 2021-09-22

## 2021-09-22 VITALS
OXYGEN SATURATION: 95 % | WEIGHT: 142 LBS | HEART RATE: 77 BPM | DIASTOLIC BLOOD PRESSURE: 80 MMHG | SYSTOLIC BLOOD PRESSURE: 124 MMHG | BODY MASS INDEX: 22.82 KG/M2 | TEMPERATURE: 97.7 F | HEIGHT: 66 IN

## 2021-09-22 DIAGNOSIS — I83.813 VARICOSE VEINS OF BOTH LOWER EXTREMITIES WITH PAIN: ICD-10-CM

## 2021-09-22 DIAGNOSIS — K59.09 CHRONIC CONSTIPATION: ICD-10-CM

## 2021-09-22 DIAGNOSIS — M25.50 ARTHRALGIA OF MULTIPLE SITES: ICD-10-CM

## 2021-09-22 DIAGNOSIS — N30.00 ACUTE CYSTITIS WITHOUT HEMATURIA: ICD-10-CM

## 2021-09-22 DIAGNOSIS — K59.00 CONSTIPATION, UNSPECIFIED CONSTIPATION TYPE: ICD-10-CM

## 2021-09-22 DIAGNOSIS — R35.0 URINARY FREQUENCY: Primary | ICD-10-CM

## 2021-09-22 LAB
BILIRUB BLD-MCNC: NEGATIVE MG/DL
CLARITY, POC: CLEAR
COLOR UR: YELLOW
GLUCOSE UR STRIP-MCNC: NEGATIVE MG/DL
KETONES UR QL: NEGATIVE
LEUKOCYTE EST, POC: ABNORMAL
NITRITE UR-MCNC: NEGATIVE MG/ML
PH UR: 6 [PH] (ref 5–8)
PROT UR STRIP-MCNC: ABNORMAL MG/DL
RBC # UR STRIP: NEGATIVE /UL
SP GR UR: 1.03 (ref 1–1.03)
UROBILINOGEN UR QL: NORMAL

## 2021-09-22 PROCEDURE — 99215 OFFICE O/P EST HI 40 MIN: CPT | Performed by: INTERNAL MEDICINE

## 2021-09-22 PROCEDURE — 81003 URINALYSIS AUTO W/O SCOPE: CPT | Performed by: INTERNAL MEDICINE

## 2021-09-22 RX ORDER — SULFAMETHOXAZOLE AND TRIMETHOPRIM 800; 160 MG/1; MG/1
1 TABLET ORAL 2 TIMES DAILY
Qty: 20 TABLET | Refills: 0 | Status: SHIPPED | OUTPATIENT
Start: 2021-09-22 | End: 2021-11-17

## 2021-09-22 RX ORDER — MELOXICAM 7.5 MG/1
7.5 TABLET ORAL DAILY PRN
Qty: 30 TABLET | Refills: 1 | Status: SHIPPED | OUTPATIENT
Start: 2021-09-22 | End: 2021-10-04 | Stop reason: SDUPTHER

## 2021-09-22 RX ORDER — LUBIPROSTONE 8 UG/1
8 CAPSULE ORAL 2 TIMES DAILY WITH MEALS
Qty: 60 CAPSULE | Refills: 3 | Status: SHIPPED | OUTPATIENT
Start: 2021-09-22 | End: 2021-12-31 | Stop reason: HOSPADM

## 2021-09-22 NOTE — TELEPHONE ENCOUNTER
Caller: SANA LAND    Relationship: Emergency Contact    Best call back number: [phone]    What is the best time to reach you: ANY TIME    Who are you requesting to speak with (clinical staff, provider,  specific staff member): DR. SALEH    What was the call regarding: PATIENT'S SON CALLED REQUESTING A CALLBACK FROM THE DOCTOR (NOT ON [facility]). COULD NOT GET DETAILS ON THE NATURE OF THE CALL, JUST THAT HE HAD QUESTIONS REGARDING HIS MOTHER    Do you require a callback: YES

## 2021-09-22 NOTE — TELEPHONE ENCOUNTER
Spoke to son, he was calling to confirm patient appointment. I informed him we do need her to update her HIPAA form

## 2021-09-22 NOTE — PROGRESS NOTES
"Subjective   Marce Williamson is a 86 y.o. female.     Chief Complaint   Patient presents with   • Constipation   • Urinary Frequency       History of Present Illness   For the last 7-10 days with urinary frequency and urgency.  States \"it hurts up in my vagina\".  Burns on urination.  Has some nausea.  Denies fever, chills, N, V, D, rash.  Seems to be slowly worsening.  Has right calf discomfort for a long time.  No new swelling.  Complains of constipation despite using dulcolax, amitiza 8 mcg  Having some bruises that she does not remember how happened.    The following portions of the patient's history were reviewed and updated as appropriate: allergies, current medications, past family history, past medical history, past social history, past surgical history and problem list.      Past Medical History:   Diagnosis Date   • Abdominal cramping    • Abdominal pain, left lateral    • Abnormal CT of the abdomen    • Allergic rhinitis    • Anxiety    • Arthralgia of multiple sites    • Arthritis    • Atrial fibrillation (CMS/HCC)    • Back pain    • BMI 25.0-25.9,adult    • Breast lump    • Calf pain    • Cervical spondylosis    • Constipation    • DDD (degenerative disc disease), lumbar    • Difficulty swallowing    • Diverticulitis of colon    • Dysphagia    • Dysuria    • Easy bruising    • Edema    • Elevated d-dimer    • Episodic tension-type headache, not intractable    • External hemorrhoids    • Fatigue    • Full thickness burn of trunk    • Generalized osteoarthritis of multiple sites    • Headache    • Heart murmur    • Heme positive stool    • Hiatal hernia    • High risk medication use    • History of colonoscopy    • History of echocardiogram 02/22/2019    EF 63% There is fibrocalcific sclerosis of the aortic valve without evidence of aortic stenosis. Mild TR. Trace MR.    • History of leukocytosis    • History of pneumonia     Left lower lobe   • History of rectal bleeding     History of blood per " rectum-Abstracted from [facility]   • History of stress test 01/23/2017    Probably normal Lexiscan cardiolite stress. No evidence of significant pharmacologic induced ischemia. No previos infarctions zones. Normal LV systolic function.    • History of transfusion     AFTER HIATAL HERNIA SX   • Hypertension    • Irregular cardiac rhythm    • Irritable bowel syndrome    • Left flank pain    • Left kidney mass    • Left lower quadrant pain    • Localized osteoarthritis of left shoulder    • Low back pain    • Lumbar foraminal stenosis    • Middle ear effusion    • Nausea     Nausea alone-Abstracted from [facility]   • Neural foraminal stenosis of cervical spine    • Nontoxic single thyroid nodule    • Osteopenia    • Other screening mammogram    • Pain in both lower extremities    • Pain in joint of right hip    • Pain in right buttock    • Pain of both hip joints    • Pain of upper extremity    • Polyp of sigmoid colon    • Rectal pain    • Renal cyst, left    • Sciatic pain, right    • Short of breath on exertion    • Shortness of breath    • Shoulder pain    • Skin lesion    • Swelling of lower extremity    • Throat mass     CYST ON RIGHT SIDE   • Tinnitus, right    • Urinary frequency    • Urinary tract infection    • Vitamin B12 deficiency    • Vitamin C deficiency    • Vulvovaginitis    • Weight loss        Past Surgical History:   Procedure Laterality Date   • ANTERIOR CERVICAL DISCECTOMY W/ FUSION N/A 4/13/2016    Procedure: C-4-C-6 CERVICAL DISCECTOMY ANTERIOR FUSION WITH INSTRUMENTATION;  Surgeon: Blaine Jim DO;  Location: [facility];  Service:    • BACK SURGERY      Dr. Stringer 2001 and Dr. Warren 2002   • CHOLECYSTECTOMY     • COLONOSCOPY      4/6/17 Normal, 3/2013 with diverticuli Dr. Dacosta   • HERNIA REPAIR     • HYSTERECTOMY     • LUMBAR FUSION      L1-L3 fusion   • REPLACEMENT TOTAL KNEE Right    • SHOULDER SURGERY         Family History   Problem Relation Age of Onset   • Heart failure Mother          CONGESTIVE   • Breast cancer Father    • Emphysema Father    • Hypertension Father    • Breast cancer Sister    • Other Sister         POLIO, 1963   • Diabetes Son    • Pancreatic cancer Son    • No Known Problems Other        Social History     Socioeconomic History   • Marital status:      Spouse name: Not on file   • Number of children: Not on file   • Years of education: Not on file   • Highest education level: Not on file   Tobacco Use   • Smoking status: Never Smoker   • Smokeless tobacco: Never Used   Substance and Sexual Activity   • Alcohol use: No   • Drug use: No   • Sexual activity: Defer       Current Outpatient Medications   Medication Sig Dispense Refill   • acebutolol (SECTRAL) 200 MG capsule Take 1 capsule by mouth Daily. 90 capsule 1   • aspirin 81 MG EC tablet Take 81 mg by mouth Daily.     • gabapentin (NEURONTIN) 400 MG capsule Take 2 capsules by mouth 3 (Three) Times a Day. 180 capsule 0   • lisinopril (PRINIVIL,ZESTRIL) 5 MG tablet Take 1 tablet by mouth Daily. take 1 tablet by mouth once daily 90 tablet 1   • lubiprostone (AMITIZA) 8 MCG capsule Take 1 capsule by mouth 2 (Two) Times a Day With Meals. 60 capsule 3   • polyethylene glycol (MIRALAX) 17 GM/SCOOP powder Take 17 g by mouth Daily. 507 g 3   • propafenone (RYTHMOL) 150 MG tablet TAKE 1 TABLET BY MOUTH TWICE DAILY. 60 tablet 0   • rOPINIRole (REQUIP) 0.25 MG tablet Take 1 tablet by mouth 2 (two) times a day. Take 1 hour before bedtime. 60 tablet 5   • benzonatate (TESSALON) 100 MG capsule Take 100 mg by mouth 3 (Three) Times a Day As Needed for Cough.     • meloxicam (Mobic) 7.5 MG tablet Take 1 tablet by mouth Daily As Needed for Mild Pain  or Moderate Pain . Take with food. 30 tablet 1   • omeprazole (priLOSEC) 20 MG capsule Take 1 capsule by mouth Daily. 90 capsule 3   • sulfamethoxazole-trimethoprim (BACTRIM DS,SEPTRA DS) 800-160 MG per tablet Take 1 tablet by mouth 2 (Two) Times a Day. 20 tablet 0   • tiZANidine  "(ZANAFLEX) 2 MG tablet Take 1 tablet by mouth Every 8 (Eight) Hours As Needed for Muscle Spasms. 90 tablet 2     No current facility-administered medications for this visit.       Review of Systems   Constitutional: Negative for activity change, appetite change, fatigue, fever, unexpected weight gain and unexpected weight loss.   HENT: Negative for nosebleeds, rhinorrhea, trouble swallowing and voice change.    Eyes: Negative for visual disturbance.   Respiratory: Negative for cough, chest tightness, shortness of breath and wheezing.    Cardiovascular: Negative for chest pain, palpitations and leg swelling.        Right greater than left leg pain with knot on the right calf   Gastrointestinal: Positive for constipation. Negative for abdominal pain, blood in stool, diarrhea, nausea, vomiting, GERD and indigestion.   Genitourinary: Positive for dysuria, frequency and urgency. Negative for hematuria.   Musculoskeletal: Negative for arthralgias, back pain and myalgias.   Skin: Negative for rash and bruise.   Neurological: Negative for dizziness, tremors, weakness, light-headedness, numbness, headache and memory problem.   Hematological: Negative for adenopathy. Bruises/bleeds easily.   Psychiatric/Behavioral: Positive for sleep disturbance. Negative for depressed mood. The patient is not nervous/anxious.        Objective   /80 (BP Location: Right arm, Patient Position: Sitting, Cuff Size: Adult)   Pulse 77   Temp 97.7 °F (36.5 °C) (Temporal)   Ht 167.6 cm (65.98\")   Wt 64.4 kg (142 lb)   SpO2 95%   BMI 22.93 kg/m²     Physical Exam  Vitals and nursing note reviewed.   Constitutional:       General: She is not in acute distress.     Appearance: She is well-developed. She is not diaphoretic.      Comments: Throughout the history and exam patient talks cyclically and repetitively referred back to her initial complaints even after we have discussed them and reviewed the treatment options and care plan.   HENT: "      Head: Normocephalic and atraumatic.      Right Ear: External ear normal.      Left Ear: External ear normal.      Nose: Nose normal.   Eyes:      Conjunctiva/sclera: Conjunctivae normal.      Pupils: Pupils are equal, round, and reactive to light.   Neck:      Thyroid: No thyromegaly.      Trachea: No tracheal deviation.   Cardiovascular:      Rate and Rhythm: Normal rate and regular rhythm.      Heart sounds: Normal heart sounds. No murmur heard.   No friction rub. No gallop.       Comments: Right posterior calf with tender varicose viens without redness.  Pulmonary:      Effort: Pulmonary effort is normal. No respiratory distress.      Breath sounds: Normal breath sounds.   Abdominal:      General: Bowel sounds are normal.      Palpations: Abdomen is soft. There is no mass.      Tenderness: There is no guarding or rebound.      Comments: Mild suprapubic tenderness and no CVA tenderness.   Musculoskeletal:         General: Normal range of motion.      Cervical back: Normal range of motion and neck supple.   Lymphadenopathy:      Cervical: No cervical adenopathy.   Skin:     General: Skin is warm and dry.      Capillary Refill: Capillary refill takes less than 2 seconds.      Findings: No rash.      Comments: Bruise 4 cm on the proximal left arm with no induration or tenderness.   Neurological:      Mental Status: She is alert and oriented to person, place, and time.      Motor: No abnormal muscle tone.      Deep Tendon Reflexes: Reflexes normal.   Psychiatric:         Behavior: Behavior normal.         Thought Content: Thought content normal.         Judgment: Judgment normal.         Recent Results (from the past 2016 hour(s))   CBC & Differential    Collection Time: 07/30/21  2:13 PM    Specimen: Blood   Result Value Ref Range    WBC 4.90 3.40 - 10.80 10*3/mm3    RBC 4.22 3.77 - 5.28 10*6/mm3    Hemoglobin 12.3 12.0 - 15.9 g/dL    Hematocrit 38.9 34.0 - 46.6 %    MCV 92.2 79.0 - 97.0 fL    MCH 29.1 26.6 -  33.0 pg    MCHC 31.6 31.5 - 35.7 g/dL    RDW 12.2 (L) 12.3 - 15.4 %    Platelets 142 140 - 450 10*3/mm3    Neutrophil Rel % CANCELED     Lymphocyte Rel % CANCELED     Monocyte Rel % CANCELED     Eosinophil Rel % CANCELED     Lymphocytes Absolute CANCELED     Eosinophils Absolute CANCELED     Basophils Absolute CANCELED    Comprehensive Metabolic Panel    Collection Time: 07/30/21  2:13 PM    Specimen: Blood   Result Value Ref Range    Glucose 122 (H) 65 - 99 mg/dL    BUN 12 8 - 23 mg/dL    Creatinine 0.62 0.57 - 1.00 mg/dL    eGFR Non African Am 91 >60 mL/min/1.73    eGFR African Am 111 >60 mL/min/1.73    BUN/Creatinine Ratio 19.4 7.0 - 25.0    Sodium 138 136 - 145 mmol/L    Potassium 4.9 3.5 - 5.2 mmol/L    Chloride 100 98 - 107 mmol/L    Total CO2 30.2 (H) 22.0 - 29.0 mmol/L    Calcium 9.2 8.6 - 10.5 mg/dL    Total Protein 6.3 6.0 - 8.5 g/dL    Albumin 4.10 3.50 - 5.20 g/dL    Globulin 2.2 gm/dL    A/G Ratio 1.9 g/dL    Total Bilirubin 0.9 0.0 - 1.2 mg/dL    Alkaline Phosphatase 82 39 - 117 U/L    AST (SGOT) 21 1 - 32 U/L    ALT (SGPT) 12 1 - 33 U/L   TSH Rfx On Abnormal To Free T4    Collection Time: 07/30/21  2:13 PM    Specimen: Blood   Result Value Ref Range    TSH 1.670 0.270 - 4.200 uIU/mL   Manual Differential    Collection Time: 07/30/21  2:13 PM   Result Value Ref Range    Neutrophil Rel % 65.7 42.7 - 76.0 %    Lymphocyte Rel % 20.2 19.6 - 45.3 %    Monocyte Rel % 10.1 5.0 - 12.0 %    Eosinophil Rel % 4.0 0.3 - 6.2 %    Neutrophils Absolute 3.22 1.70 - 7.00 10*3/mm3    Lymphocytes Absolute 0.99 0.70 - 3.10 10*3/mm3    Monocytes Absolute 0.49 0.10 - 0.90 10*3/mm3    Eosinophil Abs 0.20 0.00 - 0.40 10*3/mm3    Differential Comment Comment     Comment Comment     Plt Comment Comment    POCT urinalysis dipstick, automated    Collection Time: 09/22/21  4:26 PM    Specimen: Urine   Result Value Ref Range    Color Yellow Yellow, Straw, Dark Yellow, Nilam    Clarity, UA Clear Clear    Specific Gravity  1.030  1.005 - 1.030    pH, Urine 6.0 5.0 - 8.0    Leukocytes Trace (A) Negative    Nitrite, UA Negative Negative    Protein, POC Trace (A) Negative mg/dL    Glucose, UA Negative Negative, 1000 mg/dL (3+) mg/dL    Ketones, UA Negative Negative    Urobilinogen, UA Normal Normal    Bilirubin Negative Negative    Blood, UA Negative Negative     Assessment/Plan   Diagnoses and all orders for this visit:    1. Urinary frequency (Primary)  -     POCT urinalysis dipstick, automated  -     Urine Culture - Urine, Urine, Clean Catch  -     sulfamethoxazole-trimethoprim (BACTRIM DS,SEPTRA DS) 800-160 MG per tablet; Take 1 tablet by mouth 2 (Two) Times a Day.  Dispense: 20 tablet; Refill: 0    2. Acute cystitis without hematuria  -     Urine Culture - Urine, Urine, Clean Catch  -     sulfamethoxazole-trimethoprim (BACTRIM DS,SEPTRA DS) 800-160 MG per tablet; Take 1 tablet by mouth 2 (Two) Times a Day.  Dispense: 20 tablet; Refill: 0    3. Varicose veins of both lower extremities with pain    4. Constipation, unspecified constipation type  -     lubiprostone (AMITIZA) 8 MCG capsule; Take 1 capsule by mouth 2 (Two) Times a Day With Meals.  Dispense: 60 capsule; Refill: 3    5. Chronic constipation  -     lubiprostone (AMITIZA) 8 MCG capsule; Take 1 capsule by mouth 2 (Two) Times a Day With Meals.  Dispense: 60 capsule; Refill: 3    6. Arthralgia of multiple sites  -     meloxicam (Mobic) 7.5 MG tablet; Take 1 tablet by mouth Daily As Needed for Mild Pain  or Moderate Pain . Take with food.  Dispense: 30 tablet; Refill: 1    Other orders  -     Cancel: Compression Knee High Stockings    Patient given a prescription for compression knee-high stockings to be filled at [facility].  I discussed with her the compression may actually help with her varicose veins and may help some with her pain and even the swelling.  We reviewed this multiple times and I discussed with the her son prior to her leaving who is waiting in the waiting  room.  Patient does have some symptoms and possible signs on her urinalysis for UTI will await the culture but will treat with Bactrim DS at this time.  Based upon the test results will determine if we need to change the medication.  Constipation is a chronic issue has been given Amitiza in the past however she apparently has not been taking regularly as her bottle from her last prescription in January 2021 still has 2 tablets in.  I discussed with her that we can try and restart this medication and see if this helps with her constipation.  We reviewed this 6 times in the office.  Patient with arthralgia multiple joints with chronic pains she is asking for some medication I told her I would not give her any pain medicines that she should get this from her pain management doctor.  However, we can try a low-dose meloxicam 1 pill once a day with food see if this can help with her pain I discussed with her the risk of this medication including upset stomach bleeding and renal effects.  I reviewed this 6 times with her in the room and discussed with her son in the waiting.  I discussed with her son my concerns of her memory and care.  He is trying to obtain power of  to further care for his mother which I believe is likely appropriate.      I spent 44 minutes caring for Marce on this date of service. This time includes time spent by me in the following activities:preparing for the visit, reviewing tests, obtaining and/or reviewing a separately obtained history, performing a medically appropriate examination and/or evaluation , counseling and educating the patient/family/caregiver, ordering medications, tests, or procedures and documenting information in the medical record     · COVID-19 Precautions - Patient was compliant in wearing a mask. When I saw the patient, I used appropriate personal protective equipment (PPE) including mask and eye shield (standard procedure).  Additionally, I used gown and gloves if  indicated.  Hand hygiene was completed before and after seeing the patient.  · Dictated utilizing Dragon Dictation

## 2021-09-24 LAB
BACTERIA UR CULT: NO GROWTH
BACTERIA UR CULT: NORMAL

## 2021-09-27 ENCOUNTER — TELEPHONE (OUTPATIENT)
Dept: FAMILY MEDICINE CLINIC | Facility: CLINIC | Age: 86
End: 2021-09-27

## 2021-09-27 NOTE — TELEPHONE ENCOUNTER
Sergey is needing to know if there is any type of dementia diagnosis in her chart  & how much help can we give to him in order to get the guardianship papers completed

## 2021-09-27 NOTE — TELEPHONE ENCOUNTER
Patient called and states she is sick to her stomach and her legs hurt so bad from her knees down she can hardly stand, she says it feels like they are burning.  She says she hasn't been able to  hardly eat for the last 3 days.  She says she can't get a hold of her son or grandson to get help.  Her phone number is [phone].

## 2021-09-28 PROBLEM — R41.3 MEMORY DISTURBANCE: Status: ACTIVE | Noted: 2021-09-28

## 2021-09-28 NOTE — TELEPHONE ENCOUNTER
Currently in the chart only memory disturbance has been noted.  Unfortunately we cannot say that she has any other dementia or other diagnosis as she is never really failed any have our memory test while in the office.  Please notify Sergey of the message that the patient sent 30 minutes before he called concerning that she cannot get a hold of him.

## 2021-09-29 NOTE — TELEPHONE ENCOUNTER
Sergey spoke with Marce yesterday and was claiming that SOB, arm was black all the way from shoulder blade to elbow on the left side.  Was complaining of pain.  She asked Sergey to get EMS and he called 911.  He then called her to check on status and she claimed that she never said that.  Sergey called the neighbor that stated 911 never came.  911 called Marce on the way over and she declined service.      Sergey just wanted to make sure this was documented.  No call back needed unless Dr. Khalil has questions.

## 2021-09-30 ENCOUNTER — OFFICE VISIT (OUTPATIENT)
Dept: CARDIOLOGY | Facility: CLINIC | Age: 86
End: 2021-09-30

## 2021-09-30 VITALS
BODY MASS INDEX: 23.16 KG/M2 | RESPIRATION RATE: 20 BRPM | DIASTOLIC BLOOD PRESSURE: 60 MMHG | HEIGHT: 65 IN | OXYGEN SATURATION: 97 % | WEIGHT: 139 LBS | HEART RATE: 60 BPM | SYSTOLIC BLOOD PRESSURE: 110 MMHG

## 2021-09-30 DIAGNOSIS — I10 PRIMARY HYPERTENSION: ICD-10-CM

## 2021-09-30 DIAGNOSIS — I48.0 PAROXYSMAL ATRIAL FIBRILLATION (HCC): Primary | ICD-10-CM

## 2021-09-30 PROCEDURE — 99213 OFFICE O/P EST LOW 20 MIN: CPT | Performed by: INTERNAL MEDICINE

## 2021-10-04 DIAGNOSIS — M25.50 ARTHRALGIA OF MULTIPLE SITES: ICD-10-CM

## 2021-10-04 RX ORDER — MELOXICAM 7.5 MG/1
7.5 TABLET ORAL DAILY PRN
Qty: 30 TABLET | Refills: 0 | Status: SHIPPED | OUTPATIENT
Start: 2021-10-04 | End: 2021-12-31 | Stop reason: HOSPADM

## 2021-10-04 NOTE — TELEPHONE ENCOUNTER
Patient is calling requesting something for pain for her legs. I informed her she was written Mobic for this by our office until she could see her pain specialist Dr. Patel. Patient states she cannot find the prescription. I will send another refill to pharmacy. I have informed patient that she will have to reach out to Dr. Patel about getting anything stronger for pain.     Patient agrees and understands.

## 2021-10-11 ENCOUNTER — TELEPHONE (OUTPATIENT)
Dept: FAMILY MEDICINE CLINIC | Facility: CLINIC | Age: 86
End: 2021-10-11

## 2021-10-11 NOTE — TELEPHONE ENCOUNTER
july . . Patient called back to schedule an appointment to get checked out.  She thinks her son is trying to pull on over her and have her declared incompetent so he can get all her money and have her will changed.  She is wanting him to check her over and make sure she is healthy.

## 2021-10-11 NOTE — TELEPHONE ENCOUNTER
FYI  Patient has called to let us know she believes Sergey is trying to put her out & take over everything. She does everything by herself & we need to watch out for that Sergey Teri, he took or has hidden or savings book so she coming in to get a new one. I tried to explain that we are not the bank & she said ok, but don't tell or let Sergey know anything about her & she was coming up to the bank to get a new savings book,

## 2021-10-11 NOTE — TELEPHONE ENCOUNTER
Patient wants to speak to Dr Khalil regarding her son and her medical records. Says he is trying to pull one over on her so he can go back to [place]

## 2021-10-12 ENCOUNTER — CLINICAL SUPPORT (OUTPATIENT)
Dept: FAMILY MEDICINE CLINIC | Facility: CLINIC | Age: 86
End: 2021-10-12

## 2021-10-12 ENCOUNTER — TELEPHONE (OUTPATIENT)
Dept: FAMILY MEDICINE CLINIC | Facility: CLINIC | Age: 86
End: 2021-10-12

## 2021-10-12 DIAGNOSIS — Z23 NEED FOR INFLUENZA VACCINATION: Primary | ICD-10-CM

## 2021-10-12 PROCEDURE — 90662 IIV NO PRSV INCREASED AG IM: CPT | Performed by: INTERNAL MEDICINE

## 2021-10-12 PROCEDURE — G0008 ADMIN INFLUENZA VIRUS VAC: HCPCS | Performed by: INTERNAL MEDICINE

## 2021-10-12 NOTE — TELEPHONE ENCOUNTER
Patient calling requesting us to call her son Sergey to make sure he is bringing her for her flu shot

## 2021-10-24 ENCOUNTER — APPOINTMENT (OUTPATIENT)
Dept: GENERAL RADIOLOGY | Facility: HOSPITAL | Age: 86
End: 2021-10-24

## 2021-10-24 ENCOUNTER — APPOINTMENT (OUTPATIENT)
Dept: CT IMAGING | Facility: HOSPITAL | Age: 86
End: 2021-10-24

## 2021-10-24 ENCOUNTER — HOSPITAL ENCOUNTER (EMERGENCY)
Facility: HOSPITAL | Age: 86
Discharge: HOME | End: 2021-10-24
Attending: EMERGENCY MEDICINE | Admitting: EMERGENCY MEDICINE

## 2021-10-24 VITALS
SYSTOLIC BLOOD PRESSURE: 104 MMHG | HEIGHT: 67 IN | DIASTOLIC BLOOD PRESSURE: 47 MMHG | WEIGHT: 142.9 LBS | RESPIRATION RATE: 18 BRPM | HEART RATE: 65 BPM | OXYGEN SATURATION: 95 % | BODY MASS INDEX: 22.43 KG/M2 | TEMPERATURE: 98.7 F

## 2021-10-24 DIAGNOSIS — S49.92XA SOFT TISSUE INJURY OF LEFT SHOULDER, INITIAL ENCOUNTER: Primary | ICD-10-CM

## 2021-10-24 PROCEDURE — 99283 EMERGENCY DEPT VISIT LOW MDM: CPT

## 2021-10-24 PROCEDURE — 73030 X-RAY EXAM OF SHOULDER: CPT

## 2021-10-24 RX ORDER — ACETAMINOPHEN 500 MG
1000 TABLET ORAL ONCE
Status: COMPLETED | OUTPATIENT
Start: 2021-10-24 | End: 2021-10-24

## 2021-10-24 RX ORDER — ACETAMINOPHEN 500 MG
1000 TABLET ORAL EVERY 6 HOURS PRN
Qty: 30 TABLET | Refills: 0 | Status: SHIPPED | OUTPATIENT
Start: 2021-10-24 | End: 2021-12-31 | Stop reason: HOSPADM

## 2021-10-24 RX ORDER — LIDOCAINE 50 MG/G
1 PATCH TOPICAL
Status: DISCONTINUED | OUTPATIENT
Start: 2021-10-24 | End: 2021-10-24 | Stop reason: HOSPADM

## 2021-10-24 RX ORDER — LIDOCAINE 50 MG/G
1 PATCH TOPICAL EVERY 24 HOURS
Qty: 15 PATCH | Refills: 0 | Status: ON HOLD | OUTPATIENT
Start: 2021-10-24 | End: 2021-12-31 | Stop reason: SDUPTHER

## 2021-10-24 RX ADMIN — LIDOCAINE 1 PATCH: 50 PATCH TOPICAL at 12:44

## 2021-10-24 RX ADMIN — ACETAMINOPHEN 1000 MG: 500 TABLET ORAL at 11:51

## 2021-10-24 NOTE — ED TRIAGE NOTES
Pt arrived via ems from home. Pt reports left shoulder pain. Pt reports she fell on the 19th.     Pt was wearing a mask during assessment.  This RN wore appropriate PPE

## 2021-10-24 NOTE — ED NOTES
Son called and expressed that he has been trying to get pt placed in a facility for 2 years and that he feels that no one is helping him, pt reports he is currently out of town, process of ed visit and discharge explained, [facility] also spoke with son to provide some additional information/resources, see CCP notes     Abdoulaye Jordan, RN  10/24/21 1109

## 2021-10-24 NOTE — ED NOTES
Pt states she does not have much food at home and currently cannot drive. CCP notified and at bedside with patient.      Lexus Mendoza RN  10/24/21 2437

## 2021-10-24 NOTE — ED PROVIDER NOTES
MD ATTESTATION NOTE  Patient was placed in face mask in first look and the following protective measures were taken unless documented below in the ED course. Patient was wearing facemask when I entered the room and throughout our encounter. I wore full protective equipment throughout this patient encounter including a N95 face mask, googles, gown and gloves. Hand hygiene was performed before donning protective equipment and after removal when leaving the room.    The KARLENE and I have discussed this patient's history, physical exam, and treatment plan. I have reviewed the documentation and personally had a face to face interaction with the patient. I affirm the KARLENE documentation and agree with their diagnostics, findings, treatment, plan, and disposition.  The attached note describes my personal findings.    Marce Williamson is a 86 y.o. female who presents to the ED c/o left shoulder pain.  Patient reports that she fell last Sunday after leaving [employer], stumbled on a step, hit her head as well as her left shoulder.  Patient reports that she has been having left shoulder pain, complains of dull pain in her anterior and top of her shoulder, worse with movement.  Patient denies any difficulty with movement.  Patient does report that last night she felt like she had some numbness and tingling in her left hand, unable to say if it was the whole hand or just some fingers, reports that was short-lived but is unable to say how long, denies any numbness at present.  Patient denies any weakness.  Patient did strike her head and has bruising next her eye.  Patient denies any loss of consciousness with the head injury, no visual disturbances, no difficulty with eye movements, no facial anesthesia.  Patient denies any neck pain, no radicular pain.  Patient denies any facial droop, no difficulty speech, no ringing or ears or hearing loss.    On exam:  General: NAD.  Head: NCAT.  ENT: EOMI, PERRLA, MMM.  Neck: Supple, trachea  midline.  Cardiac: regular rate and rhythm.  Lungs: CTAB.  Abdomen: Soft, NTTP, no rebound tenderness/guarding/rigidity.   : Deferred to KARLENE.  Extremities: Moves all extremities well, no peripheral edema  Left shoulder: Normal in appearance, no crepitus or deformity, anterior and superior tenderness to palpation, shoulder has full range of motion, neurovascular intact distally.  Neuro: Alert and oriented, no facial droop, speech clear, no dysarthria or aphasia, moves all extremities well, sensation intact light touch all extremities, no focal deficits  Skin: Warm, dry.    Medical Decision Making:  After the initial H&P, I discussed pertinent information from history and physical exam with patient/family.  Discussed differential diagnosis.  Discussed plan for ED evaluation/work-up/treatment.  All questions answered.  Patient/family is agreeable with plan.    ED Course as of 10/24/21 1827   Sun Oct 24, 2021   1152 1 g of tylenol ordered for pain [RC]   1222 Patient is refusing CT scans of the face, C-spine, head.  Explained to the patient that the numbness she is reporting in her left arm could potentially be related to a C-spine fracture/herniated disc/intercranial hemorrhage.  She is alert and oriented x4 and capable of making her own sound decisions.  Will forego the CT scans and just obtain x-rays of the left shoulder. [RC]   1223 Left shoulder shows no acute fracture.  Suspect rotator cuff injury or other soft tissue injury.  Injury is over a week old and I feel better mobilizing this lady with a sling would present balance issues and risk of fall.  We will treat conservative with ice, Tylenol and orthopedic referral. [RC]      ED Course User Index  [RC] Chema Corley III, PA       Diagnosis  Final diagnoses:   Soft tissue injury of left shoulder, initial encounter        Ran Brown MD  10/24/21 1827

## 2021-10-24 NOTE — ED PROVIDER NOTES
EMERGENCY DEPARTMENT ENCOUNTER    Room Number:  BAR/E  Date of encounter:  10/24/2021  PCP: Miki Khalil MD  Historian: Patient      HPI:  Chief Complaint: Fall with head injury, left shoulder pain, left arm numbness  A complete HPI/ROS/PMH/PSH/SH/FH are unobtainable due to: Nothing    Context: Marce Williamson is a 86 y.o. female who presents to the ED c/o a fall that occurred approximately a week ago.  Patient states it was a mechanical fall secondary to stumbling on a step while attempting to walk to her neighbors.  She hit her head in the process and injured her left shoulder.  She was allegedly seen downtown and had an initial evaluation at immediate care.  She was DC'd home with an unremarkable work-up.  She presents today stating she is having moderate to severe left shoulder pain.  Pain is localized to the shoulder and is made worse with movement.  She does have some numbness tingling in her left hand.  She states when she did fall she did hit her head but did not lose consciousness.  She is not on anticoagulant therapy and is here for further evaluation.      PAST MEDICAL HISTORY  Active Ambulatory Problems     Diagnosis Date Noted   • Coronary arteriosclerosis in native artery 02/09/2016   • Chronic coronary artery disease 02/09/2016   • Chest pain 02/09/2016   • Palpitations 02/09/2016   • Hypertension 02/09/2016   • Ventricular premature beats 02/09/2016   • Shortness of breath 02/09/2016   • Disc disorder of cervical region 02/09/2016   • Cervical radiculopathy 02/09/2016   • Low back pain 02/09/2016   • Pain of lower extremity 02/09/2016   • Chronic neck pain 02/09/2016   • Pain in thoracic spine 02/09/2016   • Cough 02/09/2016   • Degeneration of intervertebral disc of lumbar region 02/09/2016   • Degeneration of intervertebral disc of thoracic region 02/09/2016   • Degeneration of intervertebral disc of thoracolumbar region 02/09/2016   • Dizziness 02/09/2016   • Dysphagia 02/09/2016   •  Gastritis 02/09/2016   • Gastroesophageal reflux disease without esophagitis 02/09/2016   • Goiter 02/09/2016   • Left arm pain 02/09/2016   • Paroxysmal atrial fibrillation (HCC) 02/23/2016   • Degenerative cervical spinal stenosis 04/13/2016   • Atrial fibrillation (HCC) 02/14/2016   • Epigastric pain 07/30/2013   • Abdominal pain 01/24/2014   • Other spondylosis with radiculopathy, cervical region 01/07/2016   • Constipation 07/07/2015   • Non-specific colitis 10/06/2015   • Other symptoms and signs involving the musculoskeletal system 03/02/2017   • Diverticulosis of intestine 10/06/2015   • Elevated troponin I level 02/15/2016   • 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   • Impingement syndrome of shoulder region 03/22/2017   • Irritable bowel syndrome 01/23/2013   • Left lower quadrant pain 08/27/2013   • Nausea 07/30/2013   • Paresthesia of lower extremity 03/02/2017   • Osteoarthritis of knee 07/22/2014   • Pulmonary venous congestion 02/14/2016   • Rotator cuff tear arthropathy 05/13/2015   • Acquired deformity of spine 08/16/2012   • Screening mammogram, encounter for 11/15/2018   • History of echocardiogram 02/22/2019   • History of stress test 01/23/2017   • Neuropathy 10/02/2019   • Left-sided chest wall pain 10/02/2019   • Pain of left breast 10/02/2019   • Urinary tract infection 11/05/2019   • Elevated hemoglobin A1c measurement 11/05/2019   • Prediabetes 11/05/2019   • Mood disorder ([facility]) 12/17/2019   • Decubitus ulcer of sacral region, stage 2 ([facility]) 12/17/2019   • Medicare annual wellness visit, subsequent 01/28/2020   • Arthralgia of multiple sites 01/28/2020   • Anxiety 03/17/2020   • Urinary frequency 06/04/2020   • Continuous leakage of urine 06/04/2020   • Wound, open, hip or thigh, right, sequela 07/01/2020   • Sciatica of right side associated with disorder of lumbar spine 10/07/2020   • Right medial knee pain 10/07/2020   • Acute cystitis without hematuria 11/10/2020   • Chronic pain 01/05/2021   •  Dysuria    • Conductive hearing loss, bilateral    • RLS (restless legs syndrome) 02/23/2021   • Chronic hand pain, right 02/23/2021   • Varicose veins of both lower extremities 06/29/2021   • Memory disturbance 09/28/2021     Resolved Ambulatory Problems     Diagnosis Date Noted   • Abrasion of left arm 11/13/2019   • Acute bronchitis due to other specified organisms 11/13/2019   • Partial thickness burn of buttock 01/28/2020   • Eschar 06/04/2020   • Abrasion of right leg, initial encounter 08/11/2020     Past Medical History:   Diagnosis Date   • Abdominal cramping    • Abdominal pain, left lateral    • Abnormal CT of the abdomen    • Allergic rhinitis    • Arthritis    • Back pain    • BMI 25.0-25.9,adult    • Breast lump    • Calf pain    • Cervical spondylosis    • DDD (degenerative disc disease), lumbar    • Difficulty swallowing    • Diverticulitis of colon    • Easy bruising    • Edema    • Elevated d-dimer    • Episodic tension-type headache, not intractable    • External hemorrhoids    • Fatigue    • Full thickness burn of trunk    • Generalized osteoarthritis of multiple sites    • Headache    • Heart murmur    • Heme positive stool    • Hiatal hernia    • High risk medication use    • History of colonoscopy    • History of leukocytosis    • History of pneumonia    • History of rectal bleeding    • History of transfusion    • Irregular cardiac rhythm    • Left flank pain    • Left kidney mass    • Localized osteoarthritis of left shoulder    • Lumbar foraminal stenosis    • Middle ear effusion    • Neural foraminal stenosis of cervical spine    • Nontoxic single thyroid nodule    • Osteopenia    • Other screening mammogram    • Pain in both lower extremities    • Pain in joint of right hip    • Pain in right buttock    • Pain of both hip joints    • Pain of upper extremity    • Polyp of sigmoid colon    • Rectal pain    • Renal cyst, left    • Sciatic pain, right    • Short of breath on exertion    •  Shoulder pain    • Skin lesion    • Swelling of lower extremity    • Throat mass    • Tinnitus, right    • Vitamin B12 deficiency    • Vitamin C deficiency    • Vulvovaginitis    • Weight loss          PAST SURGICAL HISTORY  Past Surgical History:   Procedure Laterality Date   • ANTERIOR CERVICAL DISCECTOMY W/ FUSION N/A 4/13/2016    Procedure: C-4-C-6 CERVICAL DISCECTOMY ANTERIOR FUSION WITH INSTRUMENTATION;  Surgeon: Blaine Jim DO;  Location: [facility] OR;  Service:    • BACK SURGERY      Dr. Stringer 2001 and Dr. Warren 2002   • CHOLECYSTECTOMY     • COLONOSCOPY      4/6/17 Normal, 3/2013 with diverticuli Dr. Dacosta   • HERNIA REPAIR     • HYSTERECTOMY     • LUMBAR FUSION      L1-L3 fusion   • REPLACEMENT TOTAL KNEE Right    • SHOULDER SURGERY           FAMILY HISTORY  Family History   Problem Relation Age of Onset   • Heart failure Mother         CONGESTIVE   • Breast cancer Father    • Emphysema Father    • Hypertension Father    • Breast cancer Sister    • Other Sister         POLIO, 1963   • Diabetes Son    • Pancreatic cancer Son    • No Known Problems Other          SOCIAL HISTORY  Social History     Socioeconomic History   • Marital status:    Tobacco Use   • Smoking status: Never Smoker   • Smokeless tobacco: Never Used   Substance and Sexual Activity   • Alcohol use: No   • Drug use: No   • Sexual activity: Defer         ALLERGIES  Keflex  [cephalexin], Metoclopramide, Adhesive tape, and Pentazocine        REVIEW OF SYSTEMS  Review of Systems   Constitutional: Negative for chills and fever.   Respiratory: Negative for cough and shortness of breath.    Cardiovascular: Negative for chest pain and leg swelling.   Musculoskeletal:        Left shoulder pain   Skin: Negative.    Neurological: Positive for numbness and headaches.        All systems reviewed and negative except for those discussed in HPI.       PHYSICAL EXAM    I have reviewed the triage vital signs and nursing notes.    ED Triage Vitals    Temp Heart Rate Resp BP SpO2   10/24/21 1059 10/24/21 1058 10/24/21 1058 10/24/21 1058 10/24/21 1058   98.7 °F (37.1 °C) 67 18 154/87 98 %      Temp src Heart Rate Source Patient Position BP Location FiO2 (%)   -- -- -- 10/24/21 1123 --      Right arm        Physical Exam  GENERAL: Pleasantly confused, no acute distress  HENT: nares patent, face symmetric, mucous memories moist, left periorbital ecchymosis and TTP  C-spine: No step-off deformity  EYES: no scleral icterus PERRL  CV: regular rhythm, regular rate, radial pulses +2 bilaterally  RESPIRATORY: normal effort  ABDOMEN: soft, nontender  MUSCULOSKELETAL: Increased pain with extension, flexion, abduction of the left shoulder.  Compartments of the left upper extremity soft.  Remainder the musculoskeletal exam is normal.  NEURO: alert, moves all extremities, follows commands  SKIN: warm, dry        LAB RESULTS  No results found for this or any previous visit (from the past 24 hour(s)).    Ordered the above labs and independently reviewed the results.        RADIOLOGY  XR Shoulder 2+ View Left    Result Date: 10/24/2021  XR SHOULDER 2+ VW LEFT-  10/24/2021  HISTORY: Fell. Left shoulder pain.  There are some calcification adjacent to the superior lateral aspect of the humeral head probably tendinous calcification.  No fractures or dislocations are seen. Chain sutures overlie the lateral aspect of the left lung.      1. No acute process identified in the left shoulder.  This report was finalized on 10/24/2021 12:11 PM by Dr. Hong Higgins M.D.        I ordered the above noted radiological studies. Reviewed by me and discussed with radiologist.  See dictation for official radiology interpretation.      PROCEDURES    Procedures      MEDICATIONS GIVEN IN ER    Medications   acetaminophen (TYLENOL) tablet 1,000 mg (1,000 mg Oral Given 10/24/21 1151)         PROGRESS, DATA ANALYSIS, CONSULTS, AND MEDICAL DECISION MAKING    All labs have been independently reviewed  by me.  All radiology studies have been reviewed by me and discussed with radiologist dictating the report.   EKG's independently viewed and interpreted by me.  Discussion below represents my analysis of pertinent findings related to patient's condition, differential diagnosis, treatment plan and final disposition.    DDx includes but is not limited to: Left proximal humerus fracture, left rotator cuff injury, left shoulder effusion, impingement syndrome, C-spine fracture, cervical radiculopathy, intercranial hemorrhage skull fracture, facial fractures.  To further evaluate the patient I will obtain a CT of his head, C-spine, facial bones, x-ray of the left shoulder.  Please see below for timeline of events course of care    ED Course as of 10/24/21 2044   Sun Oct 24, 2021   1152 1 g of tylenol ordered for pain [RC]   1222 Patient is refusing CT scans of the face, C-spine, head.  Explained to the patient that the numbness she is reporting in her left arm could potentially be related to a C-spine fracture/herniated disc/intercranial hemorrhage.  She is alert and oriented x4 and capable of making her own sound decisions.  Will forego the CT scans and just obtain x-rays of the left shoulder. [RC]   1223 Left shoulder shows no acute fracture.  Suspect rotator cuff injury or other soft tissue injury.  Injury is over a week old and I feel better mobilizing this lady with a sling would present balance issues and risk of fall.  We will treat conservative with ice, Tylenol and orthopedic referral. [RC]      ED Course User Index  [RC] Chema Corley III, PA           PPE: The patient wore a surgical mask throughout the entire patient encounter. I wore an N95.    AS OF 20:44 EDT VITALS:    BP - 104/47  HR - 65  TEMP - 98.7 °F (37.1 °C)  O2 SATS - 95%        DIAGNOSIS  Final diagnoses:   Soft tissue injury of left shoulder, initial encounter         DISPOSITION  DISCHARGE    Patient discharged in stable  condition.    Reviewed implications of results, diagnosis, meds, responsibility to follow up, warning signs and symptoms of possible worsening, potential complications and reasons to return to ER.    Patient/Family voiced understanding of above instructions.    Discussed plan for discharge, as there is no emergent indication for admission. Patient referred to primary care provider for BP management due to today's BP. Pt/family is agreeable and understands need for follow up and repeat testing.  Pt is aware that discharge does not mean that nothing is wrong but it indicates no emergency is present that requires admission and they must continue care with follow-up as given below or physician of their choice.     FOLLOW-UP  Chelle Akins MD  [address] [facility]  [address]  [place] [id number] [phone]    Schedule an appointment as soon as possible for a visit   For further evaluation and treatment    Miki Khalil MD  [address]  [address]  [facility] [address]  [phone]    Schedule an appointment as soon as possible for a visit   For further evaluation and treatment in interim management         Medication List      New Prescriptions    acetaminophen 500 MG tablet  Commonly known as: TYLENOL  Take 2 tablets by mouth Every 6 (Six) Hours As Needed for Moderate Pain .     Diclofenac Sodium 1 % gel gel  Commonly known as: VOLTAREN  Apply 4 g topically to the appropriate area as directed 3 (Three) Times a Day.     lidocaine 5 %  Commonly known as: LIDODERM  Place 1 patch on the skin as directed by provider Daily. Remove & Discard patch within 12 hours or as directed by MD           Where to Get Your Medications      You can get these medications from any pharmacy    Bring a paper prescription for each of these medications  · acetaminophen 500 MG tablet  · Diclofenac Sodium 1 % gel gel  · lidocaine 5 %                Chema Corley III, PA  10/24/21 [id number]

## 2021-10-24 NOTE — CASE MANAGEMENT/SOCIAL WORK
"This CCP nurse spoke with Sergey Teri via phone, who was irate that pt was being sent home. Son would not let this CCP nurse speak, stated that \"we did not know anything and that this was our responsibility to do something with her\" Son, Leonela confirmed that he was out of town, \"to get away from her for a while\", then hung up the phone. Rosa Maria Lynn RN    "

## 2021-10-24 NOTE — CASE MANAGEMENT/SOCIAL WORK
This CCP nurse spoke with pt who stated that she did not have any groceries at home. THis nurse contacted Gerardo Dee Dee,  at [facility](with pts permission) who stated that they deliver her groceries weekly, and that he will follow up with her this afternoon. Informed Mr. Funez that [facility] will be sending pt home via cab. Rosa Maria Lynn RN

## 2021-10-25 NOTE — CASE MANAGEMENT/SOCIAL WORK
This CCP nurse contacted [facility] re circumstances of  ER visit. ID # [id number]. 10- 2014. Rosa Maria Lynn RN

## 2021-10-26 ENCOUNTER — OFFICE VISIT (OUTPATIENT)
Dept: FAMILY MEDICINE CLINIC | Facility: CLINIC | Age: 86
End: 2021-10-26

## 2021-10-26 VITALS
SYSTOLIC BLOOD PRESSURE: 124 MMHG | WEIGHT: 142 LBS | HEART RATE: 84 BPM | OXYGEN SATURATION: 96 % | HEIGHT: 67 IN | DIASTOLIC BLOOD PRESSURE: 72 MMHG | BODY MASS INDEX: 22.29 KG/M2 | TEMPERATURE: 98 F

## 2021-10-26 DIAGNOSIS — F41.9 ANXIETY: ICD-10-CM

## 2021-10-26 DIAGNOSIS — R42 DIZZINESS: ICD-10-CM

## 2021-10-26 DIAGNOSIS — R06.02 SHORTNESS OF BREATH: Primary | ICD-10-CM

## 2021-10-26 PROCEDURE — 93000 ELECTROCARDIOGRAM COMPLETE: CPT | Performed by: INTERNAL MEDICINE

## 2021-10-26 PROCEDURE — 99214 OFFICE O/P EST MOD 30 MIN: CPT | Performed by: INTERNAL MEDICINE
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
## 2021-11-05 DIAGNOSIS — R20.2 PARESTHESIA OF LOWER EXTREMITY: ICD-10-CM

## 2021-11-08 RX ORDER — GABAPENTIN 400 MG/1
CAPSULE ORAL
Qty: 180 CAPSULE | Refills: 0 | Status: ON HOLD | OUTPATIENT
Start: 2021-11-08 | End: 2021-12-31 | Stop reason: SDUPTHER

## 2021-11-08 NOTE — TELEPHONE ENCOUNTER
Rx Refill Note  Requested Prescriptions     Pending Prescriptions Disp Refills   • gabapentin (NEURONTIN) 400 MG capsule [Pharmacy Med Name: GABAPENTIN 400MG CAPSULES] 180 capsule      Sig: TAKE 2 CAPSULES BY MOUTH THREE TIMES DAILY      Last office visit with prescribing clinician: 10/26/2021      Next office visit with prescribing clinician: not on file or in notes          Last filled 7/6/2021           Toña Kennedy MA  11/08/21, 09:10 EST

## 2021-11-09 DIAGNOSIS — I48.0 PAROXYSMAL ATRIAL FIBRILLATION (HCC): ICD-10-CM

## 2021-11-09 RX ORDER — PROPAFENONE HYDROCHLORIDE 150 MG/1
150 TABLET, COATED ORAL 2 TIMES DAILY
Qty: 180 TABLET | Refills: 1 | Status: ON HOLD | OUTPATIENT
Start: 2021-11-09 | End: 2021-12-31 | Stop reason: SDUPTHER

## 2021-11-11 ENCOUNTER — TELEPHONE (OUTPATIENT)
Dept: FAMILY MEDICINE CLINIC | Facility: CLINIC | Age: 86
End: 2021-11-11

## 2021-11-11 NOTE — TELEPHONE ENCOUNTER
Spoke with pharmacy these are ready for .   Spoke to patient, she stated she knows she called them.

## 2021-11-11 NOTE — TELEPHONE ENCOUNTER
states pharmacy has not received refill for both medications:    1.  gabapentin (NEURONTIN) 400 MG capsule [Pharmacy Med Name: GABAPENTIN 400MG CAPSULES]      2.  propafenone (RYTHMOL) 150 MG tablet    Confirmed pharmacy     [id number] - [facility] [address] AT [facility] - [phone] [facility] [phone] FX

## 2021-11-17 ENCOUNTER — OFFICE VISIT (OUTPATIENT)
Dept: FAMILY MEDICINE CLINIC | Facility: CLINIC | Age: 86
End: 2021-11-17

## 2021-11-17 VITALS
OXYGEN SATURATION: 96 % | HEART RATE: 73 BPM | TEMPERATURE: 97.7 F | DIASTOLIC BLOOD PRESSURE: 50 MMHG | BODY MASS INDEX: 21.2 KG/M2 | WEIGHT: 136.2 LBS | SYSTOLIC BLOOD PRESSURE: 102 MMHG

## 2021-11-17 DIAGNOSIS — K59.00 CONSTIPATION, UNSPECIFIED CONSTIPATION TYPE: ICD-10-CM

## 2021-11-17 DIAGNOSIS — R35.0 URINARY FREQUENCY: Primary | ICD-10-CM

## 2021-11-17 LAB
BILIRUB BLD-MCNC: NEGATIVE MG/DL
CLARITY, POC: CLEAR
COLOR UR: YELLOW
EXPIRATION DATE: ABNORMAL
GLUCOSE UR STRIP-MCNC: NEGATIVE MG/DL
KETONES UR QL: NEGATIVE
LEUKOCYTE EST, POC: NEGATIVE
Lab: ABNORMAL
NITRITE UR-MCNC: NEGATIVE MG/ML
PH UR: 6 [PH] (ref 5–8)
PROT UR STRIP-MCNC: ABNORMAL MG/DL
RBC # UR STRIP: NEGATIVE /UL
SP GR UR: 1.03 (ref 1–1.03)
UROBILINOGEN UR QL: NORMAL

## 2021-11-17 PROCEDURE — 81003 URINALYSIS AUTO W/O SCOPE: CPT | Performed by: NURSE PRACTITIONER

## 2021-11-17 PROCEDURE — 99213 OFFICE O/P EST LOW 20 MIN: CPT | Performed by: NURSE PRACTITIONER

## 2021-11-17 RX ORDER — POLYETHYLENE GLYCOL 3350 17 G/17G
17 POWDER, FOR SOLUTION ORAL DAILY PRN
Qty: 507 G | Refills: 1 | Status: SHIPPED | OUTPATIENT
Start: 2021-11-17

## 2021-11-17 NOTE — PROGRESS NOTES
Chief Complaint  Urinary Frequency    Bill Williamson presents to [facility]  Urinary Frequency   This is a recurrent problem. The patient is experiencing no pain. There has been no fever. Associated symptoms include flank pain (left side) and frequency. Pertinent negatives include no discharge, hematuria, nausea or vomiting. She has tried antibiotics for the symptoms. The treatment provided no relief.     Objective   Vital Signs:   /50 (BP Location: Right arm, Patient Position: Sitting, Cuff Size: Adult)   Pulse 73   Temp 97.7 °F (36.5 °C) (Temporal)   Wt 61.8 kg (136 lb 3.2 oz)   SpO2 96%   BMI 21.20 kg/m²     Physical Exam  Vitals and nursing note reviewed.   Cardiovascular:      Rate and Rhythm: Normal rate and regular rhythm.      Heart sounds: Normal heart sounds.   Pulmonary:      Effort: Pulmonary effort is normal.      Breath sounds: Normal breath sounds.   Abdominal:      General: Bowel sounds are normal.      Palpations: Abdomen is soft.      Tenderness: There is no abdominal tenderness. There is no right CVA tenderness or left CVA tenderness.   Neurological:      Mental Status: She is oriented to person, place, and time.   Psychiatric:         Mood and Affect: Mood normal.        Result Review :   The following data was reviewed by: MARIEL Ashraf on 11/17/2021:  UA    Urinalysis 6/29/21 9/22/21 11/17/21   Ketones, UA Negative Negative Negative   Leukocytes, UA Negative Trace (A) Negative   (A) Abnormal value                   Assessment and Plan    Diagnoses and all orders for this visit:    1. Urinary frequency (Primary)  -     POCT urinalysis dipstick, automated  -     Urine Culture - Urine, Urine, Clean Catch  -     Ambulatory Referral to Urology    2. Constipation, unspecified constipation type  -     polyethylene glycol (MIRALAX) 17 GM/SCOOP powder; Take 17 g by mouth Daily As Needed (constipation).  Dispense: 507 g; Refill:  1      I spent 20 minutes caring for Marce on this date of service. This time includes time spent by me in the following activities:reviewing tests, performing a medically appropriate examination and/or evaluation , counseling and educating the patient/family/caregiver, ordering medications, tests, or procedures and documenting information in the medical record  Follow Up   Return if symptoms worsen or fail to improve.  Patient was given instructions and counseling regarding her condition or for health maintenance advice. Please see specific information pulled into the AVS if appropriate.

## 2021-11-19 LAB
BACTERIA UR CULT: NORMAL
BACTERIA UR CULT: NORMAL

## 2021-12-14 ENCOUNTER — TELEPHONE (OUTPATIENT)
Dept: FAMILY MEDICINE CLINIC | Facility: CLINIC | Age: 86
End: 2021-12-14

## 2021-12-14 ENCOUNTER — OFFICE VISIT (OUTPATIENT)
Dept: FAMILY MEDICINE CLINIC | Facility: CLINIC | Age: 86
End: 2021-12-14

## 2021-12-14 VITALS
WEIGHT: 134 LBS | BODY MASS INDEX: 21.03 KG/M2 | OXYGEN SATURATION: 98 % | TEMPERATURE: 98.7 F | DIASTOLIC BLOOD PRESSURE: 70 MMHG | HEIGHT: 67 IN | SYSTOLIC BLOOD PRESSURE: 124 MMHG | HEART RATE: 98 BPM

## 2021-12-14 DIAGNOSIS — I10 PRIMARY HYPERTENSION: Primary | ICD-10-CM

## 2021-12-14 DIAGNOSIS — G25.81 RLS (RESTLESS LEGS SYNDROME): ICD-10-CM

## 2021-12-14 DIAGNOSIS — R41.3 MEMORY DISTURBANCE: ICD-10-CM

## 2021-12-14 PROCEDURE — 99215 OFFICE O/P EST HI 40 MIN: CPT | Performed by: INTERNAL MEDICINE
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
## 2021-12-15 ENCOUNTER — HOSPITAL ENCOUNTER (INPATIENT)
Facility: HOSPITAL | Age: 86
LOS: 14 days | Discharge: NURSING, REHAB OR LONG-TERM CARE | End: 2021-12-31
Attending: EMERGENCY MEDICINE | Admitting: INTERNAL MEDICINE

## 2021-12-15 ENCOUNTER — TELEPHONE (OUTPATIENT)
Dept: FAMILY MEDICINE CLINIC | Facility: CLINIC | Age: 86
End: 2021-12-15

## 2021-12-15 ENCOUNTER — APPOINTMENT (OUTPATIENT)
Dept: CT IMAGING | Facility: HOSPITAL | Age: 86
End: 2021-12-15

## 2021-12-15 ENCOUNTER — APPOINTMENT (OUTPATIENT)
Dept: GENERAL RADIOLOGY | Facility: HOSPITAL | Age: 86
End: 2021-12-15

## 2021-12-15 DIAGNOSIS — G89.4 CHRONIC PAIN SYNDROME: ICD-10-CM

## 2021-12-15 DIAGNOSIS — M53.86 SCIATICA OF RIGHT SIDE ASSOCIATED WITH DISORDER OF LUMBAR SPINE: ICD-10-CM

## 2021-12-15 DIAGNOSIS — G62.9 NEUROPATHY: ICD-10-CM

## 2021-12-15 DIAGNOSIS — G25.81 RLS (RESTLESS LEGS SYNDROME): ICD-10-CM

## 2021-12-15 DIAGNOSIS — I10 ESSENTIAL HYPERTENSION: ICD-10-CM

## 2021-12-15 DIAGNOSIS — R00.2 PALPITATIONS: ICD-10-CM

## 2021-12-15 DIAGNOSIS — I48.0 PAROXYSMAL ATRIAL FIBRILLATION: ICD-10-CM

## 2021-12-15 DIAGNOSIS — M25.50 ARTHRALGIA OF MULTIPLE SITES: ICD-10-CM

## 2021-12-15 DIAGNOSIS — R41.82 ALTERED MENTAL STATUS, UNSPECIFIED ALTERED MENTAL STATUS TYPE: ICD-10-CM

## 2021-12-15 DIAGNOSIS — R20.2 PARESTHESIA OF LOWER EXTREMITY: ICD-10-CM

## 2021-12-15 DIAGNOSIS — F03.91 DEMENTIA WITH BEHAVIORAL DISTURBANCE, UNSPECIFIED DEMENTIA TYPE: Primary | ICD-10-CM

## 2021-12-15 LAB
ALBUMIN SERPL-MCNC: 3.6 G/DL (ref 3.5–5.2)
ALBUMIN/GLOB SERPL: 1.3 G/DL
ALP SERPL-CCNC: 74 U/L (ref 39–117)
ALT SERPL W P-5'-P-CCNC: 8 U/L (ref 1–33)
AMPHET+METHAMPHET UR QL: NEGATIVE
ANION GAP SERPL CALCULATED.3IONS-SCNC: 7.9 MMOL/L (ref 5–15)
AST SERPL-CCNC: 14 U/L (ref 1–32)
BARBITURATES UR QL SCN: NEGATIVE
BASOPHILS # BLD AUTO: 0.01 10*3/MM3 (ref 0–0.2)
BASOPHILS NFR BLD AUTO: 0.2 % (ref 0–1.5)
BENZODIAZ UR QL SCN: NEGATIVE
BILIRUB SERPL-MCNC: 0.8 MG/DL (ref 0–1.2)
BILIRUB UR QL STRIP: NEGATIVE
BUN SERPL-MCNC: 11 MG/DL (ref 8–23)
BUN/CREAT SERPL: 22.9 (ref 7–25)
CALCIUM SPEC-SCNC: 8.6 MG/DL (ref 8.6–10.5)
CANNABINOIDS SERPL QL: NEGATIVE
CHLORIDE SERPL-SCNC: 101 MMOL/L (ref 98–107)
CLARITY UR: CLEAR
CO2 SERPL-SCNC: 27.1 MMOL/L (ref 22–29)
COCAINE UR QL: NEGATIVE
COLOR UR: YELLOW
CREAT SERPL-MCNC: 0.48 MG/DL (ref 0.57–1)
D-LACTATE SERPL-SCNC: 1.6 MMOL/L (ref 0.5–2)
DEPRECATED RDW RBC AUTO: 37.8 FL (ref 37–54)
EOSINOPHIL # BLD AUTO: 0.18 10*3/MM3 (ref 0–0.4)
EOSINOPHIL NFR BLD AUTO: 2.9 % (ref 0.3–6.2)
ERYTHROCYTE [DISTWIDTH] IN BLOOD BY AUTOMATED COUNT: 11.9 % (ref 12.3–15.4)
ETHANOL BLD-MCNC: <10 MG/DL (ref 0–10)
ETHANOL UR QL: <0.01 %
GFR SERPL CREATININE-BSD FRML MDRD: 123 ML/MIN/1.73
GLOBULIN UR ELPH-MCNC: 2.7 GM/DL
GLUCOSE SERPL-MCNC: 109 MG/DL (ref 65–99)
GLUCOSE UR STRIP-MCNC: NEGATIVE MG/DL
HCT VFR BLD AUTO: 34.9 % (ref 34–46.6)
HGB BLD-MCNC: 11.7 G/DL (ref 12–15.9)
HGB UR QL STRIP.AUTO: NEGATIVE
HOLD SPECIMEN: NORMAL
HOLD SPECIMEN: NORMAL
IMM GRANULOCYTES # BLD AUTO: 0.03 10*3/MM3 (ref 0–0.05)
IMM GRANULOCYTES NFR BLD AUTO: 0.5 % (ref 0–0.5)
KETONES UR QL STRIP: NEGATIVE
LEUKOCYTE ESTERASE UR QL STRIP.AUTO: NEGATIVE
LYMPHOCYTES # BLD AUTO: 1.23 10*3/MM3 (ref 0.7–3.1)
LYMPHOCYTES NFR BLD AUTO: 19.5 % (ref 19.6–45.3)
MCH RBC QN AUTO: 29.2 PG (ref 26.6–33)
MCHC RBC AUTO-ENTMCNC: 33.5 G/DL (ref 31.5–35.7)
MCV RBC AUTO: 87 FL (ref 79–97)
METHADONE UR QL SCN: NEGATIVE
MONOCYTES # BLD AUTO: 1.12 10*3/MM3 (ref 0.1–0.9)
MONOCYTES NFR BLD AUTO: 17.8 % (ref 5–12)
NEUTROPHILS NFR BLD AUTO: 3.73 10*3/MM3 (ref 1.7–7)
NEUTROPHILS NFR BLD AUTO: 59.1 % (ref 42.7–76)
NITRITE UR QL STRIP: NEGATIVE
NRBC BLD AUTO-RTO: 0 /100 WBC (ref 0–0.2)
OPIATES UR QL: NEGATIVE
OXYCODONE UR QL SCN: NEGATIVE
PH UR STRIP.AUTO: 6 [PH] (ref 5–8)
PLATELET # BLD AUTO: 122 10*3/MM3 (ref 140–450)
PMV BLD AUTO: 12.2 FL (ref 6–12)
POTASSIUM SERPL-SCNC: 3.9 MMOL/L (ref 3.5–5.2)
PROCALCITONIN SERPL-MCNC: 0.05 NG/ML (ref 0–0.25)
PROT SERPL-MCNC: 6.3 G/DL (ref 6–8.5)
PROT UR QL STRIP: NEGATIVE
RBC # BLD AUTO: 4.01 10*6/MM3 (ref 3.77–5.28)
SARS-COV-2 RNA PNL SPEC NAA+PROBE: NOT DETECTED
SODIUM SERPL-SCNC: 136 MMOL/L (ref 136–145)
SP GR UR STRIP: 1.02 (ref 1–1.03)
UROBILINOGEN UR QL STRIP: NORMAL
WBC NRBC COR # BLD: 6.3 10*3/MM3 (ref 3.4–10.8)
WHOLE BLOOD HOLD SPECIMEN: NORMAL
WHOLE BLOOD HOLD SPECIMEN: NORMAL

## 2021-12-15 PROCEDURE — 80053 COMPREHEN METABOLIC PANEL: CPT | Performed by: NURSE PRACTITIONER

## 2021-12-15 PROCEDURE — 80307 DRUG TEST PRSMV CHEM ANLYZR: CPT | Performed by: EMERGENCY MEDICINE

## 2021-12-15 PROCEDURE — 81003 URINALYSIS AUTO W/O SCOPE: CPT | Performed by: NURSE PRACTITIONER

## 2021-12-15 PROCEDURE — 25010000002 ZIPRASIDONE MESYLATE PER 10 MG: Performed by: PHYSICIAN ASSISTANT

## 2021-12-15 PROCEDURE — 84145 PROCALCITONIN (PCT): CPT | Performed by: NURSE PRACTITIONER

## 2021-12-15 PROCEDURE — 25010000002 LORAZEPAM PER 2 MG: Performed by: EMERGENCY MEDICINE

## 2021-12-15 PROCEDURE — 82077 ASSAY SPEC XCP UR&BREATH IA: CPT | Performed by: NURSE PRACTITIONER

## 2021-12-15 PROCEDURE — 25010000002 DROPERIDOL PER 5 MG: Performed by: PHYSICIAN ASSISTANT

## 2021-12-15 PROCEDURE — 99285 EMERGENCY DEPT VISIT HI MDM: CPT

## 2021-12-15 PROCEDURE — 87635 SARS-COV-2 COVID-19 AMP PRB: CPT | Performed by: NURSE PRACTITIONER

## 2021-12-15 PROCEDURE — 70450 CT HEAD/BRAIN W/O DYE: CPT

## 2021-12-15 PROCEDURE — 90791 PSYCH DIAGNOSTIC EVALUATION: CPT

## 2021-12-15 PROCEDURE — 83605 ASSAY OF LACTIC ACID: CPT | Performed by: NURSE PRACTITIONER

## 2021-12-15 PROCEDURE — 71045 X-RAY EXAM CHEST 1 VIEW: CPT

## 2021-12-15 PROCEDURE — 85025 COMPLETE CBC W/AUTO DIFF WBC: CPT | Performed by: NURSE PRACTITIONER

## 2021-12-15 PROCEDURE — P9612 CATHETERIZE FOR URINE SPEC: HCPCS

## 2021-12-15 PROCEDURE — 36415 COLL VENOUS BLD VENIPUNCTURE: CPT

## 2021-12-15 RX ORDER — LORAZEPAM 2 MG/ML
0.5 INJECTION INTRAMUSCULAR ONCE
Status: DISCONTINUED | OUTPATIENT
Start: 2021-12-15 | End: 2021-12-15

## 2021-12-15 RX ORDER — ZIPRASIDONE MESYLATE 20 MG/ML
10 INJECTION, POWDER, LYOPHILIZED, FOR SOLUTION INTRAMUSCULAR ONCE
Status: COMPLETED | OUTPATIENT
Start: 2021-12-15 | End: 2021-12-15

## 2021-12-15 RX ORDER — SODIUM CHLORIDE 0.9 % (FLUSH) 0.9 %
10 SYRINGE (ML) INJECTION AS NEEDED
Status: DISCONTINUED | OUTPATIENT
Start: 2021-12-15 | End: 2021-12-31 | Stop reason: HOSPADM

## 2021-12-15 RX ORDER — DROPERIDOL 2.5 MG/ML
2.5 INJECTION, SOLUTION INTRAMUSCULAR; INTRAVENOUS ONCE
Status: COMPLETED | OUTPATIENT
Start: 2021-12-15 | End: 2021-12-15

## 2021-12-15 RX ORDER — LORAZEPAM 2 MG/ML
0.5 INJECTION INTRAMUSCULAR ONCE
Status: COMPLETED | OUTPATIENT
Start: 2021-12-15 | End: 2021-12-15

## 2021-12-15 RX ADMIN — DROPERIDOL 2.5 MG: 2.5 INJECTION, SOLUTION INTRAMUSCULAR; INTRAVENOUS at 20:40

## 2021-12-15 RX ADMIN — LORAZEPAM 0.5 MG: 2 INJECTION INTRAMUSCULAR; INTRAVENOUS at 22:00

## 2021-12-15 RX ADMIN — ZIPRASIDONE MESYLATE 10 MG: 20 INJECTION, POWDER, LYOPHILIZED, FOR SOLUTION INTRAMUSCULAR at 23:35

## 2021-12-15 NOTE — TELEPHONE ENCOUNTER
AMARJIT    Pt called stating she is currently in the hospital and does not want to stay there.    -Advised that the previous direction is to stay and complete treatment.    -The call was disconnected

## 2021-12-15 NOTE — TELEPHONE ENCOUNTER
Patient called complaining of neck, shoulder, back & leg pain along with numbness in both arms. I offered to call EMS for her & stay on the phone if she needed. Patient kept saying she didn't know if she needed to do that & would call the office back if she doesn't feel better soon & she disconnected the call.

## 2021-12-15 NOTE — TELEPHONE ENCOUNTER
Pt called in stating having pain in legs and cannot walk.     -States having trouble breathing.    -Advised needs to visit emergency room for higher level of care.     -Called ems for pt    Pt can be reached at     [phone] home phone

## 2021-12-15 NOTE — TELEPHONE ENCOUNTER
FYI  Patient states having shortness of air and pain in shoulders, back. Also having trouble standing and walking.  Pt was in distress and was advised to go to ER for higher level of care  Pt asked for assistance getting to ER   Called EMS FOR pt and stayed on the phone with pt until ems arrived in home.

## 2021-12-16 PROBLEM — F03.918 DEMENTIA WITH BEHAVIORAL DISTURBANCE (HCC): Status: ACTIVE | Noted: 2021-12-16

## 2021-12-16 PROCEDURE — G0378 HOSPITAL OBSERVATION PER HR: HCPCS

## 2021-12-16 PROCEDURE — 25010000002 ZIPRASIDONE MESYLATE PER 10 MG: Performed by: NURSE PRACTITIONER

## 2021-12-16 RX ORDER — ASPIRIN 81 MG/1
81 TABLET ORAL DAILY
Status: DISCONTINUED | OUTPATIENT
Start: 2021-12-17 | End: 2021-12-31 | Stop reason: HOSPADM

## 2021-12-16 RX ORDER — ROPINIROLE 0.5 MG/1
0.25 TABLET, FILM COATED ORAL NIGHTLY
Status: DISCONTINUED | OUTPATIENT
Start: 2021-12-17 | End: 2021-12-31 | Stop reason: HOSPADM

## 2021-12-16 RX ORDER — SODIUM CHLORIDE 0.9 % (FLUSH) 0.9 %
10 SYRINGE (ML) INJECTION AS NEEDED
Status: DISCONTINUED | OUTPATIENT
Start: 2021-12-16 | End: 2021-12-31 | Stop reason: HOSPADM

## 2021-12-16 RX ORDER — POLYETHYLENE GLYCOL 3350 17 G/17G
17 POWDER, FOR SOLUTION ORAL DAILY PRN
Status: DISCONTINUED | OUTPATIENT
Start: 2021-12-16 | End: 2021-12-20

## 2021-12-16 RX ORDER — SODIUM CHLORIDE 0.9 % (FLUSH) 0.9 %
10 SYRINGE (ML) INJECTION EVERY 12 HOURS SCHEDULED
Status: DISCONTINUED | OUTPATIENT
Start: 2021-12-16 | End: 2021-12-30

## 2021-12-16 RX ORDER — TRAMADOL HYDROCHLORIDE 50 MG/1
25 TABLET ORAL EVERY 6 HOURS PRN
Status: DISPENSED | OUTPATIENT
Start: 2021-12-16 | End: 2021-12-23

## 2021-12-16 RX ORDER — ACETAMINOPHEN 500 MG
1000 TABLET ORAL EVERY 6 HOURS PRN
Status: DISCONTINUED | OUTPATIENT
Start: 2021-12-16 | End: 2021-12-31 | Stop reason: HOSPADM

## 2021-12-16 RX ORDER — LIDOCAINE 50 MG/G
1 PATCH TOPICAL EVERY 24 HOURS
Status: DISCONTINUED | OUTPATIENT
Start: 2021-12-17 | End: 2021-12-31 | Stop reason: HOSPADM

## 2021-12-16 RX ORDER — ACETAMINOPHEN 650 MG/1
650 SUPPOSITORY RECTAL EVERY 4 HOURS PRN
Status: DISCONTINUED | OUTPATIENT
Start: 2021-12-16 | End: 2021-12-31 | Stop reason: HOSPADM

## 2021-12-16 RX ORDER — ACETAMINOPHEN 160 MG/5ML
650 SOLUTION ORAL EVERY 4 HOURS PRN
Status: DISCONTINUED | OUTPATIENT
Start: 2021-12-16 | End: 2021-12-31 | Stop reason: HOSPADM

## 2021-12-16 RX ORDER — TIZANIDINE 4 MG/1
2 TABLET ORAL EVERY 8 HOURS PRN
Status: DISCONTINUED | OUTPATIENT
Start: 2021-12-17 | End: 2021-12-31 | Stop reason: HOSPADM

## 2021-12-16 RX ORDER — ACETAMINOPHEN 325 MG/1
650 TABLET ORAL EVERY 4 HOURS PRN
Status: DISCONTINUED | OUTPATIENT
Start: 2021-12-16 | End: 2021-12-31 | Stop reason: HOSPADM

## 2021-12-16 RX ORDER — ZIPRASIDONE MESYLATE 20 MG/ML
10 INJECTION, POWDER, LYOPHILIZED, FOR SOLUTION INTRAMUSCULAR ONCE
Status: COMPLETED | OUTPATIENT
Start: 2021-12-16 | End: 2021-12-16

## 2021-12-16 RX ORDER — ONDANSETRON 2 MG/ML
4 INJECTION INTRAMUSCULAR; INTRAVENOUS EVERY 6 HOURS PRN
Status: DISCONTINUED | OUTPATIENT
Start: 2021-12-16 | End: 2021-12-31 | Stop reason: HOSPADM

## 2021-12-16 RX ORDER — LUBIPROSTONE 8 UG/1
8 CAPSULE ORAL 2 TIMES DAILY WITH MEALS
Status: DISCONTINUED | OUTPATIENT
Start: 2021-12-17 | End: 2021-12-19

## 2021-12-16 RX ORDER — OLANZAPINE 10 MG/1
5 INJECTION, POWDER, LYOPHILIZED, FOR SOLUTION INTRAMUSCULAR EVERY 8 HOURS PRN
Status: DISCONTINUED | OUTPATIENT
Start: 2021-12-16 | End: 2021-12-31 | Stop reason: HOSPADM

## 2021-12-16 RX ORDER — MELOXICAM 7.5 MG/1
7.5 TABLET ORAL DAILY PRN
Status: DISCONTINUED | OUTPATIENT
Start: 2021-12-17 | End: 2021-12-20

## 2021-12-16 RX ORDER — LISINOPRIL 5 MG/1
5 TABLET ORAL DAILY
Status: DISCONTINUED | OUTPATIENT
Start: 2021-12-17 | End: 2021-12-20

## 2021-12-16 RX ORDER — PANTOPRAZOLE SODIUM 40 MG/1
40 TABLET, DELAYED RELEASE ORAL EVERY MORNING
Status: DISCONTINUED | OUTPATIENT
Start: 2021-12-17 | End: 2021-12-20

## 2021-12-16 RX ORDER — GABAPENTIN 300 MG/1
300 CAPSULE ORAL 3 TIMES DAILY
Status: DISCONTINUED | OUTPATIENT
Start: 2021-12-16 | End: 2021-12-31 | Stop reason: HOSPADM

## 2021-12-16 RX ORDER — PROPAFENONE HYDROCHLORIDE 150 MG/1
150 TABLET, COATED ORAL 2 TIMES DAILY
Status: DISCONTINUED | OUTPATIENT
Start: 2021-12-17 | End: 2021-12-31 | Stop reason: HOSPADM

## 2021-12-16 RX ADMIN — ZIPRASIDONE MESYLATE 10 MG: 20 INJECTION, POWDER, LYOPHILIZED, FOR SOLUTION INTRAMUSCULAR at 13:04

## 2021-12-16 RX ADMIN — OLANZAPINE 5 MG: 10 INJECTION, POWDER, LYOPHILIZED, FOR SOLUTION INTRAMUSCULAR at 15:44

## 2021-12-17 LAB
ALBUMIN SERPL-MCNC: 4.1 G/DL (ref 3.5–5.2)
ALBUMIN/GLOB SERPL: 1.5 G/DL
ALP SERPL-CCNC: 88 U/L (ref 39–117)
ALT SERPL W P-5'-P-CCNC: 10 U/L (ref 1–33)
ANION GAP SERPL CALCULATED.3IONS-SCNC: 11.9 MMOL/L (ref 5–15)
AST SERPL-CCNC: 23 U/L (ref 1–32)
BASOPHILS # BLD AUTO: 0.01 10*3/MM3 (ref 0–0.2)
BASOPHILS NFR BLD AUTO: 0.1 % (ref 0–1.5)
BILIRUB SERPL-MCNC: 1.4 MG/DL (ref 0–1.2)
BUN SERPL-MCNC: 8 MG/DL (ref 8–23)
BUN/CREAT SERPL: 14.3 (ref 7–25)
CALCIUM SPEC-SCNC: 9.2 MG/DL (ref 8.6–10.5)
CHLORIDE SERPL-SCNC: 99 MMOL/L (ref 98–107)
CO2 SERPL-SCNC: 26.1 MMOL/L (ref 22–29)
CREAT SERPL-MCNC: 0.56 MG/DL (ref 0.57–1)
DEPRECATED RDW RBC AUTO: 37.7 FL (ref 37–54)
EOSINOPHIL # BLD AUTO: 0.28 10*3/MM3 (ref 0–0.4)
EOSINOPHIL NFR BLD AUTO: 3.7 % (ref 0.3–6.2)
ERYTHROCYTE [DISTWIDTH] IN BLOOD BY AUTOMATED COUNT: 12.1 % (ref 12.3–15.4)
GFR SERPL CREATININE-BSD FRML MDRD: 103 ML/MIN/1.73
GLOBULIN UR ELPH-MCNC: 2.8 GM/DL
GLUCOSE SERPL-MCNC: 133 MG/DL (ref 65–99)
HCT VFR BLD AUTO: 38.7 % (ref 34–46.6)
HGB BLD-MCNC: 13.2 G/DL (ref 12–15.9)
IMM GRANULOCYTES # BLD AUTO: 0.04 10*3/MM3 (ref 0–0.05)
IMM GRANULOCYTES NFR BLD AUTO: 0.5 % (ref 0–0.5)
LYMPHOCYTES # BLD AUTO: 1.74 10*3/MM3 (ref 0.7–3.1)
LYMPHOCYTES NFR BLD AUTO: 22.7 % (ref 19.6–45.3)
MCH RBC QN AUTO: 29.5 PG (ref 26.6–33)
MCHC RBC AUTO-ENTMCNC: 34.1 G/DL (ref 31.5–35.7)
MCV RBC AUTO: 86.4 FL (ref 79–97)
MONOCYTES # BLD AUTO: 1.38 10*3/MM3 (ref 0.1–0.9)
MONOCYTES NFR BLD AUTO: 18 % (ref 5–12)
NEUTROPHILS NFR BLD AUTO: 4.22 10*3/MM3 (ref 1.7–7)
NEUTROPHILS NFR BLD AUTO: 55 % (ref 42.7–76)
NRBC BLD AUTO-RTO: 0 /100 WBC (ref 0–0.2)
PLATELET # BLD AUTO: 155 10*3/MM3 (ref 140–450)
PMV BLD AUTO: 12.8 FL (ref 6–12)
POTASSIUM SERPL-SCNC: 4.3 MMOL/L (ref 3.5–5.2)
PROT SERPL-MCNC: 6.9 G/DL (ref 6–8.5)
RBC # BLD AUTO: 4.48 10*6/MM3 (ref 3.77–5.28)
SODIUM SERPL-SCNC: 137 MMOL/L (ref 136–145)
WBC NRBC COR # BLD: 7.67 10*3/MM3 (ref 3.4–10.8)

## 2021-12-17 PROCEDURE — 80053 COMPREHEN METABOLIC PANEL: CPT | Performed by: INTERNAL MEDICINE

## 2021-12-17 PROCEDURE — 85025 COMPLETE CBC W/AUTO DIFF WBC: CPT | Performed by: INTERNAL MEDICINE

## 2021-12-17 PROCEDURE — 36415 COLL VENOUS BLD VENIPUNCTURE: CPT | Performed by: INTERNAL MEDICINE

## 2021-12-17 RX ADMIN — PANTOPRAZOLE SODIUM 40 MG: 40 TABLET, DELAYED RELEASE ORAL at 06:12

## 2021-12-17 RX ADMIN — SODIUM CHLORIDE, PRESERVATIVE FREE 10 ML: 5 INJECTION INTRAVENOUS at 00:51

## 2021-12-17 RX ADMIN — SODIUM CHLORIDE, PRESERVATIVE FREE 10 ML: 5 INJECTION INTRAVENOUS at 09:51

## 2021-12-17 RX ADMIN — PROPAFENONE HYDROCHLORIDE 150 MG: 150 TABLET, COATED ORAL at 20:46

## 2021-12-17 RX ADMIN — SODIUM CHLORIDE, PRESERVATIVE FREE 10 ML: 5 INJECTION INTRAVENOUS at 20:46

## 2021-12-17 RX ADMIN — PROPAFENONE HYDROCHLORIDE 150 MG: 150 TABLET, COATED ORAL at 02:59

## 2021-12-17 RX ADMIN — GABAPENTIN 300 MG: 300 CAPSULE ORAL at 00:51

## 2021-12-17 RX ADMIN — DICLOFENAC SODIUM 4 G: 10 GEL TOPICAL at 09:50

## 2021-12-17 RX ADMIN — DICLOFENAC SODIUM 4 G: 10 GEL TOPICAL at 00:51

## 2021-12-17 RX ADMIN — GABAPENTIN 300 MG: 300 CAPSULE ORAL at 09:57

## 2021-12-17 RX ADMIN — DICLOFENAC SODIUM 4 G: 10 GEL TOPICAL at 20:46

## 2021-12-17 RX ADMIN — LUBIPROSTONE 8 MCG: 8 CAPSULE, GELATIN COATED ORAL at 17:59

## 2021-12-17 RX ADMIN — DICLOFENAC SODIUM 4 G: 10 GEL TOPICAL at 17:04

## 2021-12-17 RX ADMIN — GABAPENTIN 300 MG: 300 CAPSULE ORAL at 17:05

## 2021-12-17 RX ADMIN — PROPAFENONE HYDROCHLORIDE 150 MG: 150 TABLET, COATED ORAL at 09:49

## 2021-12-17 RX ADMIN — ROPINIROLE HYDROCHLORIDE 0.25 MG: 0.5 TABLET, FILM COATED ORAL at 03:00

## 2021-12-17 RX ADMIN — LISINOPRIL 5 MG: 5 TABLET ORAL at 09:49

## 2021-12-17 RX ADMIN — ROPINIROLE HYDROCHLORIDE 0.25 MG: 0.5 TABLET, FILM COATED ORAL at 20:46

## 2021-12-17 RX ADMIN — GABAPENTIN 300 MG: 300 CAPSULE ORAL at 20:45

## 2021-12-17 RX ADMIN — ASPIRIN 81 MG: 81 TABLET, COATED ORAL at 09:57

## 2021-12-17 RX ADMIN — LUBIPROSTONE 8 MCG: 8 CAPSULE, GELATIN COATED ORAL at 09:49

## 2021-12-18 PROCEDURE — 97162 PT EVAL MOD COMPLEX 30 MIN: CPT

## 2021-12-18 PROCEDURE — 97530 THERAPEUTIC ACTIVITIES: CPT

## 2021-12-18 RX ORDER — CHOLECALCIFEROL (VITAMIN D3) 125 MCG
5 CAPSULE ORAL NIGHTLY PRN
Status: DISCONTINUED | OUTPATIENT
Start: 2021-12-18 | End: 2021-12-31 | Stop reason: HOSPADM

## 2021-12-18 RX ADMIN — GABAPENTIN 300 MG: 300 CAPSULE ORAL at 21:37

## 2021-12-18 RX ADMIN — Medication 5 MG: at 22:41

## 2021-12-18 RX ADMIN — SODIUM CHLORIDE, PRESERVATIVE FREE 10 ML: 5 INJECTION INTRAVENOUS at 21:42

## 2021-12-18 RX ADMIN — PANTOPRAZOLE SODIUM 40 MG: 40 TABLET, DELAYED RELEASE ORAL at 06:26

## 2021-12-18 RX ADMIN — ROPINIROLE HYDROCHLORIDE 0.25 MG: 0.5 TABLET, FILM COATED ORAL at 21:37

## 2021-12-18 RX ADMIN — DICLOFENAC SODIUM 4 G: 10 GEL TOPICAL at 10:33

## 2021-12-18 RX ADMIN — GABAPENTIN 300 MG: 300 CAPSULE ORAL at 18:08

## 2021-12-18 RX ADMIN — DICLOFENAC SODIUM 4 G: 10 GEL TOPICAL at 18:08

## 2021-12-18 RX ADMIN — ASPIRIN 81 MG: 81 TABLET, COATED ORAL at 10:32

## 2021-12-18 RX ADMIN — PROPAFENONE HYDROCHLORIDE 150 MG: 150 TABLET, COATED ORAL at 21:37

## 2021-12-18 RX ADMIN — TRAMADOL HYDROCHLORIDE 25 MG: 50 TABLET ORAL at 10:32

## 2021-12-18 RX ADMIN — PROPAFENONE HYDROCHLORIDE 150 MG: 150 TABLET, COATED ORAL at 10:31

## 2021-12-18 RX ADMIN — LUBIPROSTONE 8 MCG: 8 CAPSULE, GELATIN COATED ORAL at 18:08

## 2021-12-18 RX ADMIN — DICLOFENAC SODIUM 4 G: 10 GEL TOPICAL at 21:43

## 2021-12-18 RX ADMIN — LUBIPROSTONE 8 MCG: 8 CAPSULE, GELATIN COATED ORAL at 10:31

## 2021-12-18 RX ADMIN — ACETAMINOPHEN 650 MG: 325 TABLET, FILM COATED ORAL at 03:28

## 2021-12-18 RX ADMIN — LISINOPRIL 5 MG: 5 TABLET ORAL at 10:31

## 2021-12-18 RX ADMIN — GABAPENTIN 300 MG: 300 CAPSULE ORAL at 10:32

## 2021-12-19 LAB — C DIFF TOX GENS STL QL NAA+PROBE: POSITIVE

## 2021-12-19 PROCEDURE — 87493 C DIFF AMPLIFIED PROBE: CPT | Performed by: HOSPITALIST

## 2021-12-19 RX ORDER — SODIUM CHLORIDE, SODIUM LACTATE, POTASSIUM CHLORIDE, CALCIUM CHLORIDE 600; 310; 30; 20 MG/100ML; MG/100ML; MG/100ML; MG/100ML
50 INJECTION, SOLUTION INTRAVENOUS CONTINUOUS
Status: DISCONTINUED | OUTPATIENT
Start: 2021-12-19 | End: 2021-12-22

## 2021-12-19 RX ADMIN — SODIUM CHLORIDE, PRESERVATIVE FREE 10 ML: 5 INJECTION INTRAVENOUS at 09:51

## 2021-12-19 RX ADMIN — PANTOPRAZOLE SODIUM 40 MG: 40 TABLET, DELAYED RELEASE ORAL at 06:39

## 2021-12-19 RX ADMIN — GABAPENTIN 300 MG: 300 CAPSULE ORAL at 09:50

## 2021-12-19 RX ADMIN — ASPIRIN 81 MG: 81 TABLET, COATED ORAL at 09:50

## 2021-12-19 RX ADMIN — GABAPENTIN 300 MG: 300 CAPSULE ORAL at 17:28

## 2021-12-19 RX ADMIN — DICLOFENAC SODIUM 4 G: 10 GEL TOPICAL at 09:53

## 2021-12-19 RX ADMIN — ROPINIROLE HYDROCHLORIDE 0.25 MG: 0.5 TABLET, FILM COATED ORAL at 21:00

## 2021-12-19 RX ADMIN — PROPAFENONE HYDROCHLORIDE 150 MG: 150 TABLET, COATED ORAL at 21:01

## 2021-12-19 RX ADMIN — LUBIPROSTONE 8 MCG: 8 CAPSULE, GELATIN COATED ORAL at 09:50

## 2021-12-19 RX ADMIN — LISINOPRIL 5 MG: 5 TABLET ORAL at 09:50

## 2021-12-19 RX ADMIN — SODIUM CHLORIDE, POTASSIUM CHLORIDE, SODIUM LACTATE AND CALCIUM CHLORIDE 75 ML/HR: 600; 310; 30; 20 INJECTION, SOLUTION INTRAVENOUS at 17:36

## 2021-12-19 RX ADMIN — PROPAFENONE HYDROCHLORIDE 150 MG: 150 TABLET, COATED ORAL at 09:50

## 2021-12-19 RX ADMIN — GABAPENTIN 300 MG: 300 CAPSULE ORAL at 21:01

## 2021-12-20 ENCOUNTER — TELEPHONE (OUTPATIENT)
Dept: FAMILY MEDICINE CLINIC | Facility: CLINIC | Age: 86
End: 2021-12-20

## 2021-12-20 LAB
ANION GAP SERPL CALCULATED.3IONS-SCNC: 11.9 MMOL/L (ref 5–15)
BUN SERPL-MCNC: 37 MG/DL (ref 8–23)
BUN/CREAT SERPL: 21.8 (ref 7–25)
CALCIUM SPEC-SCNC: 9.1 MG/DL (ref 8.6–10.5)
CHLORIDE SERPL-SCNC: 96 MMOL/L (ref 98–107)
CO2 SERPL-SCNC: 23.1 MMOL/L (ref 22–29)
CREAT SERPL-MCNC: 1.7 MG/DL (ref 0.57–1)
DEPRECATED RDW RBC AUTO: 39.5 FL (ref 37–54)
ERYTHROCYTE [DISTWIDTH] IN BLOOD BY AUTOMATED COUNT: 12.1 % (ref 12.3–15.4)
GFR SERPL CREATININE-BSD FRML MDRD: 28 ML/MIN/1.73
GLUCOSE SERPL-MCNC: 134 MG/DL (ref 65–99)
HCT VFR BLD AUTO: 39.1 % (ref 34–46.6)
HGB BLD-MCNC: 12.6 G/DL (ref 12–15.9)
MCH RBC QN AUTO: 28.6 PG (ref 26.6–33)
MCHC RBC AUTO-ENTMCNC: 32.2 G/DL (ref 31.5–35.7)
MCV RBC AUTO: 88.9 FL (ref 79–97)
PLATELET # BLD AUTO: 199 10*3/MM3 (ref 140–450)
PMV BLD AUTO: 13.3 FL (ref 6–12)
POTASSIUM SERPL-SCNC: 4.7 MMOL/L (ref 3.5–5.2)
RBC # BLD AUTO: 4.4 10*6/MM3 (ref 3.77–5.28)
SODIUM SERPL-SCNC: 131 MMOL/L (ref 136–145)
WBC NRBC COR # BLD: 16.11 10*3/MM3 (ref 3.4–10.8)

## 2021-12-20 PROCEDURE — 85027 COMPLETE CBC AUTOMATED: CPT | Performed by: HOSPITALIST

## 2021-12-20 PROCEDURE — 97535 SELF CARE MNGMENT TRAINING: CPT

## 2021-12-20 PROCEDURE — 80048 BASIC METABOLIC PNL TOTAL CA: CPT | Performed by: HOSPITALIST

## 2021-12-20 PROCEDURE — 97116 GAIT TRAINING THERAPY: CPT

## 2021-12-20 PROCEDURE — 97530 THERAPEUTIC ACTIVITIES: CPT

## 2021-12-20 PROCEDURE — 97165 OT EVAL LOW COMPLEX 30 MIN: CPT

## 2021-12-20 RX ORDER — FAMOTIDINE 20 MG/1
10 TABLET, FILM COATED ORAL
Status: DISCONTINUED | OUTPATIENT
Start: 2021-12-20 | End: 2021-12-31 | Stop reason: HOSPADM

## 2021-12-20 RX ORDER — VANCOMYCIN HYDROCHLORIDE 125 MG/1
125 CAPSULE ORAL EVERY 6 HOURS SCHEDULED
Status: COMPLETED | OUTPATIENT
Start: 2021-12-20 | End: 2021-12-30

## 2021-12-20 RX ORDER — FAMOTIDINE 20 MG/1
20 TABLET, FILM COATED ORAL
Status: DISCONTINUED | OUTPATIENT
Start: 2021-12-20 | End: 2021-12-20

## 2021-12-20 RX ADMIN — Medication 5 MG: at 22:27

## 2021-12-20 RX ADMIN — PROPAFENONE HYDROCHLORIDE 150 MG: 150 TABLET, COATED ORAL at 09:11

## 2021-12-20 RX ADMIN — SODIUM CHLORIDE, PRESERVATIVE FREE 10 ML: 5 INJECTION INTRAVENOUS at 21:17

## 2021-12-20 RX ADMIN — ASPIRIN 81 MG: 81 TABLET, COATED ORAL at 09:11

## 2021-12-20 RX ADMIN — FAMOTIDINE 20 MG: 20 TABLET, FILM COATED ORAL at 06:59

## 2021-12-20 RX ADMIN — GABAPENTIN 300 MG: 300 CAPSULE ORAL at 21:17

## 2021-12-20 RX ADMIN — SODIUM CHLORIDE, POTASSIUM CHLORIDE, SODIUM LACTATE AND CALCIUM CHLORIDE 75 ML/HR: 600; 310; 30; 20 INJECTION, SOLUTION INTRAVENOUS at 07:34

## 2021-12-20 RX ADMIN — VANCOMYCIN HYDROCHLORIDE 125 MG: 125 CAPSULE ORAL at 09:11

## 2021-12-20 RX ADMIN — VANCOMYCIN HYDROCHLORIDE 125 MG: 125 CAPSULE ORAL at 16:10

## 2021-12-20 RX ADMIN — GABAPENTIN 300 MG: 300 CAPSULE ORAL at 09:11

## 2021-12-20 RX ADMIN — VANCOMYCIN HYDROCHLORIDE 125 MG: 125 CAPSULE ORAL at 21:17

## 2021-12-20 RX ADMIN — GABAPENTIN 300 MG: 300 CAPSULE ORAL at 18:19

## 2021-12-20 RX ADMIN — LISINOPRIL 5 MG: 5 TABLET ORAL at 09:11

## 2021-12-20 RX ADMIN — ROPINIROLE HYDROCHLORIDE 0.25 MG: 0.5 TABLET, FILM COATED ORAL at 21:16

## 2021-12-20 RX ADMIN — PROPAFENONE HYDROCHLORIDE 150 MG: 150 TABLET, COATED ORAL at 21:17

## 2021-12-20 RX ADMIN — FAMOTIDINE 10 MG: 20 TABLET, FILM COATED ORAL at 18:19

## 2021-12-20 RX ADMIN — SODIUM CHLORIDE, POTASSIUM CHLORIDE, SODIUM LACTATE AND CALCIUM CHLORIDE 75 ML/HR: 600; 310; 30; 20 INJECTION, SOLUTION INTRAVENOUS at 22:27

## 2021-12-21 PROBLEM — N17.9 AKI (ACUTE KIDNEY INJURY) (HCC): Status: ACTIVE | Noted: 2021-12-21

## 2021-12-21 LAB
ANION GAP SERPL CALCULATED.3IONS-SCNC: 7.8 MMOL/L (ref 5–15)
BUN SERPL-MCNC: 24 MG/DL (ref 8–23)
BUN/CREAT SERPL: 26.1 (ref 7–25)
CALCIUM SPEC-SCNC: 8.5 MG/DL (ref 8.6–10.5)
CHLORIDE SERPL-SCNC: 99 MMOL/L (ref 98–107)
CO2 SERPL-SCNC: 25.2 MMOL/L (ref 22–29)
CREAT SERPL-MCNC: 0.92 MG/DL (ref 0.57–1)
DEPRECATED RDW RBC AUTO: 38.1 FL (ref 37–54)
ERYTHROCYTE [DISTWIDTH] IN BLOOD BY AUTOMATED COUNT: 11.9 % (ref 12.3–15.4)
GFR SERPL CREATININE-BSD FRML MDRD: 58 ML/MIN/1.73
GLUCOSE SERPL-MCNC: 89 MG/DL (ref 65–99)
HCT VFR BLD AUTO: 31.6 % (ref 34–46.6)
HGB BLD-MCNC: 10.5 G/DL (ref 12–15.9)
MCH RBC QN AUTO: 29.1 PG (ref 26.6–33)
MCHC RBC AUTO-ENTMCNC: 33.2 G/DL (ref 31.5–35.7)
MCV RBC AUTO: 87.5 FL (ref 79–97)
PLATELET # BLD AUTO: 147 10*3/MM3 (ref 140–450)
PMV BLD AUTO: 12.7 FL (ref 6–12)
POTASSIUM SERPL-SCNC: 3.9 MMOL/L (ref 3.5–5.2)
RBC # BLD AUTO: 3.61 10*6/MM3 (ref 3.77–5.28)
SODIUM SERPL-SCNC: 132 MMOL/L (ref 136–145)
WBC NRBC COR # BLD: 10.01 10*3/MM3 (ref 3.4–10.8)

## 2021-12-21 PROCEDURE — 85027 COMPLETE CBC AUTOMATED: CPT | Performed by: HOSPITALIST

## 2021-12-21 PROCEDURE — 80048 BASIC METABOLIC PNL TOTAL CA: CPT | Performed by: HOSPITALIST

## 2021-12-21 RX ORDER — OLANZAPINE 2.5 MG/1
2.5 TABLET ORAL 2 TIMES DAILY
Status: DISCONTINUED | OUTPATIENT
Start: 2021-12-21 | End: 2021-12-23

## 2021-12-21 RX ORDER — OLANZAPINE 2.5 MG/1
2.5 TABLET ORAL NIGHTLY
Status: DISCONTINUED | OUTPATIENT
Start: 2021-12-21 | End: 2021-12-21

## 2021-12-21 RX ADMIN — OLANZAPINE 2.5 MG: 2.5 TABLET ORAL at 22:35

## 2021-12-21 RX ADMIN — GABAPENTIN 300 MG: 300 CAPSULE ORAL at 16:19

## 2021-12-21 RX ADMIN — FAMOTIDINE 10 MG: 20 TABLET, FILM COATED ORAL at 16:19

## 2021-12-21 RX ADMIN — PROPAFENONE HYDROCHLORIDE 150 MG: 150 TABLET, COATED ORAL at 22:35

## 2021-12-21 RX ADMIN — VANCOMYCIN HYDROCHLORIDE 125 MG: 125 CAPSULE ORAL at 03:50

## 2021-12-21 RX ADMIN — ASPIRIN 81 MG: 81 TABLET, COATED ORAL at 09:38

## 2021-12-21 RX ADMIN — PROPAFENONE HYDROCHLORIDE 150 MG: 150 TABLET, COATED ORAL at 09:38

## 2021-12-21 RX ADMIN — VANCOMYCIN HYDROCHLORIDE 125 MG: 125 CAPSULE ORAL at 09:38

## 2021-12-21 RX ADMIN — SODIUM CHLORIDE, POTASSIUM CHLORIDE, SODIUM LACTATE AND CALCIUM CHLORIDE 75 ML/HR: 600; 310; 30; 20 INJECTION, SOLUTION INTRAVENOUS at 12:34

## 2021-12-21 RX ADMIN — VANCOMYCIN HYDROCHLORIDE 125 MG: 125 CAPSULE ORAL at 16:19

## 2021-12-21 RX ADMIN — GABAPENTIN 300 MG: 300 CAPSULE ORAL at 09:38

## 2021-12-21 RX ADMIN — VANCOMYCIN HYDROCHLORIDE 125 MG: 125 CAPSULE ORAL at 22:35

## 2021-12-21 RX ADMIN — ACETAMINOPHEN 650 MG: 325 TABLET, FILM COATED ORAL at 04:00

## 2021-12-21 RX ADMIN — ROPINIROLE HYDROCHLORIDE 0.25 MG: 0.5 TABLET, FILM COATED ORAL at 22:35

## 2021-12-21 RX ADMIN — GABAPENTIN 300 MG: 300 CAPSULE ORAL at 22:35

## 2021-12-21 RX ADMIN — FAMOTIDINE 10 MG: 20 TABLET, FILM COATED ORAL at 09:38

## 2021-12-22 LAB
ANION GAP SERPL CALCULATED.3IONS-SCNC: 7.2 MMOL/L (ref 5–15)
BUN SERPL-MCNC: 8 MG/DL (ref 8–23)
BUN/CREAT SERPL: 16 (ref 7–25)
CALCIUM SPEC-SCNC: 8.8 MG/DL (ref 8.6–10.5)
CHLORIDE SERPL-SCNC: 105 MMOL/L (ref 98–107)
CO2 SERPL-SCNC: 26.8 MMOL/L (ref 22–29)
CREAT SERPL-MCNC: 0.5 MG/DL (ref 0.57–1)
DEPRECATED RDW RBC AUTO: 39.2 FL (ref 37–54)
ERYTHROCYTE [DISTWIDTH] IN BLOOD BY AUTOMATED COUNT: 12.1 % (ref 12.3–15.4)
GFR SERPL CREATININE-BSD FRML MDRD: 117 ML/MIN/1.73
GLUCOSE SERPL-MCNC: 94 MG/DL (ref 65–99)
HCT VFR BLD AUTO: 34.8 % (ref 34–46.6)
HGB BLD-MCNC: 11.4 G/DL (ref 12–15.9)
MCH RBC QN AUTO: 29.3 PG (ref 26.6–33)
MCHC RBC AUTO-ENTMCNC: 32.8 G/DL (ref 31.5–35.7)
MCV RBC AUTO: 89.5 FL (ref 79–97)
PLATELET # BLD AUTO: 161 10*3/MM3 (ref 140–450)
PMV BLD AUTO: 12.1 FL (ref 6–12)
POTASSIUM SERPL-SCNC: 3.6 MMOL/L (ref 3.5–5.2)
RBC # BLD AUTO: 3.89 10*6/MM3 (ref 3.77–5.28)
SODIUM SERPL-SCNC: 139 MMOL/L (ref 136–145)
WBC NRBC COR # BLD: 6.69 10*3/MM3 (ref 3.4–10.8)

## 2021-12-22 PROCEDURE — 85027 COMPLETE CBC AUTOMATED: CPT | Performed by: HOSPITALIST

## 2021-12-22 PROCEDURE — 97116 GAIT TRAINING THERAPY: CPT

## 2021-12-22 PROCEDURE — 80048 BASIC METABOLIC PNL TOTAL CA: CPT | Performed by: HOSPITALIST

## 2021-12-22 RX ADMIN — VANCOMYCIN HYDROCHLORIDE 125 MG: 125 CAPSULE ORAL at 08:58

## 2021-12-22 RX ADMIN — ACETAMINOPHEN 1000 MG: 500 TABLET ORAL at 00:52

## 2021-12-22 RX ADMIN — GABAPENTIN 300 MG: 300 CAPSULE ORAL at 16:27

## 2021-12-22 RX ADMIN — PROPAFENONE HYDROCHLORIDE 150 MG: 150 TABLET, COATED ORAL at 20:39

## 2021-12-22 RX ADMIN — DICLOFENAC SODIUM 4 G: 10 GEL TOPICAL at 16:28

## 2021-12-22 RX ADMIN — VANCOMYCIN HYDROCHLORIDE 125 MG: 125 CAPSULE ORAL at 05:09

## 2021-12-22 RX ADMIN — GABAPENTIN 300 MG: 300 CAPSULE ORAL at 20:39

## 2021-12-22 RX ADMIN — FAMOTIDINE 10 MG: 20 TABLET, FILM COATED ORAL at 07:00

## 2021-12-22 RX ADMIN — OLANZAPINE 2.5 MG: 2.5 TABLET ORAL at 08:58

## 2021-12-22 RX ADMIN — ASPIRIN 81 MG: 81 TABLET, COATED ORAL at 08:59

## 2021-12-22 RX ADMIN — VANCOMYCIN HYDROCHLORIDE 125 MG: 125 CAPSULE ORAL at 20:39

## 2021-12-22 RX ADMIN — GABAPENTIN 300 MG: 300 CAPSULE ORAL at 08:58

## 2021-12-22 RX ADMIN — DICLOFENAC SODIUM 4 G: 10 GEL TOPICAL at 20:39

## 2021-12-22 RX ADMIN — OLANZAPINE 2.5 MG: 2.5 TABLET ORAL at 20:39

## 2021-12-22 RX ADMIN — DICLOFENAC SODIUM 4 G: 10 GEL TOPICAL at 09:00

## 2021-12-22 RX ADMIN — TRAMADOL HYDROCHLORIDE 25 MG: 50 TABLET ORAL at 03:26

## 2021-12-22 RX ADMIN — ROPINIROLE HYDROCHLORIDE 0.25 MG: 0.5 TABLET, FILM COATED ORAL at 20:39

## 2021-12-22 RX ADMIN — SODIUM CHLORIDE, PRESERVATIVE FREE 10 ML: 5 INJECTION INTRAVENOUS at 20:40

## 2021-12-22 RX ADMIN — PROPAFENONE HYDROCHLORIDE 150 MG: 150 TABLET, COATED ORAL at 08:58

## 2021-12-22 RX ADMIN — VANCOMYCIN HYDROCHLORIDE 125 MG: 125 CAPSULE ORAL at 16:28

## 2021-12-22 RX ADMIN — SODIUM CHLORIDE, POTASSIUM CHLORIDE, SODIUM LACTATE AND CALCIUM CHLORIDE 50 ML/HR: 600; 310; 30; 20 INJECTION, SOLUTION INTRAVENOUS at 05:46

## 2021-12-22 RX ADMIN — FAMOTIDINE 10 MG: 20 TABLET, FILM COATED ORAL at 16:27

## 2021-12-23 ENCOUNTER — TELEPHONE (OUTPATIENT)
Dept: FAMILY MEDICINE CLINIC | Facility: CLINIC | Age: 86
End: 2021-12-23

## 2021-12-23 RX ORDER — OLANZAPINE 5 MG/1
5 TABLET ORAL 2 TIMES DAILY
Status: DISCONTINUED | OUTPATIENT
Start: 2021-12-23 | End: 2021-12-31 | Stop reason: HOSPADM

## 2021-12-23 RX ORDER — LISINOPRIL 5 MG/1
5 TABLET ORAL DAILY
Status: DISCONTINUED | OUTPATIENT
Start: 2021-12-23 | End: 2021-12-31 | Stop reason: HOSPADM

## 2021-12-23 RX ADMIN — VANCOMYCIN HYDROCHLORIDE 125 MG: 125 CAPSULE ORAL at 08:17

## 2021-12-23 RX ADMIN — SODIUM CHLORIDE, PRESERVATIVE FREE 10 ML: 5 INJECTION INTRAVENOUS at 22:00

## 2021-12-23 RX ADMIN — OLANZAPINE 2.5 MG: 2.5 TABLET ORAL at 08:17

## 2021-12-23 RX ADMIN — FAMOTIDINE 10 MG: 20 TABLET, FILM COATED ORAL at 06:37

## 2021-12-23 RX ADMIN — DICLOFENAC SODIUM 4 G: 10 GEL TOPICAL at 16:17

## 2021-12-23 RX ADMIN — PROPAFENONE HYDROCHLORIDE 150 MG: 150 TABLET, COATED ORAL at 08:17

## 2021-12-23 RX ADMIN — VANCOMYCIN HYDROCHLORIDE 125 MG: 125 CAPSULE ORAL at 21:56

## 2021-12-23 RX ADMIN — VANCOMYCIN HYDROCHLORIDE 125 MG: 125 CAPSULE ORAL at 03:12

## 2021-12-23 RX ADMIN — VANCOMYCIN HYDROCHLORIDE 125 MG: 125 CAPSULE ORAL at 16:17

## 2021-12-23 RX ADMIN — ASPIRIN 81 MG: 81 TABLET, COATED ORAL at 08:17

## 2021-12-23 RX ADMIN — LISINOPRIL 5 MG: 5 TABLET ORAL at 18:33

## 2021-12-23 RX ADMIN — DICLOFENAC SODIUM 4 G: 10 GEL TOPICAL at 21:56

## 2021-12-23 RX ADMIN — FAMOTIDINE 10 MG: 20 TABLET, FILM COATED ORAL at 16:17

## 2021-12-23 RX ADMIN — OLANZAPINE 5 MG: 5 TABLET ORAL at 21:56

## 2021-12-23 RX ADMIN — GABAPENTIN 300 MG: 300 CAPSULE ORAL at 08:17

## 2021-12-23 RX ADMIN — GABAPENTIN 300 MG: 300 CAPSULE ORAL at 22:00

## 2021-12-23 RX ADMIN — DICLOFENAC SODIUM 4 G: 10 GEL TOPICAL at 08:17

## 2021-12-23 RX ADMIN — SODIUM CHLORIDE, PRESERVATIVE FREE 10 ML: 5 INJECTION INTRAVENOUS at 08:17

## 2021-12-23 RX ADMIN — GABAPENTIN 300 MG: 300 CAPSULE ORAL at 16:17

## 2021-12-23 RX ADMIN — ROPINIROLE HYDROCHLORIDE 0.25 MG: 0.5 TABLET, FILM COATED ORAL at 21:56

## 2021-12-23 RX ADMIN — PROPAFENONE HYDROCHLORIDE 150 MG: 150 TABLET, COATED ORAL at 21:56

## 2021-12-23 NOTE — TELEPHONE ENCOUNTER
FYI:  The following patient called and explained she has been in the hospital for several days and wants to be discharged. Pt states she has family coming for the holiday and needs to be home to prepare. I stayed on the phone with the patient for over 30 minutes while she explained over and over that she needed to be home for the holiday.

## 2021-12-24 LAB
ANION GAP SERPL CALCULATED.3IONS-SCNC: 9.8 MMOL/L (ref 5–15)
BUN SERPL-MCNC: 6 MG/DL (ref 8–23)
BUN/CREAT SERPL: 12.2 (ref 7–25)
CALCIUM SPEC-SCNC: 8.4 MG/DL (ref 8.6–10.5)
CHLORIDE SERPL-SCNC: 106 MMOL/L (ref 98–107)
CO2 SERPL-SCNC: 28.2 MMOL/L (ref 22–29)
CREAT SERPL-MCNC: 0.49 MG/DL (ref 0.57–1)
GFR SERPL CREATININE-BSD FRML MDRD: 120 ML/MIN/1.73
GLUCOSE SERPL-MCNC: 95 MG/DL (ref 65–99)
POTASSIUM SERPL-SCNC: 3.5 MMOL/L (ref 3.5–5.2)
SODIUM SERPL-SCNC: 144 MMOL/L (ref 136–145)

## 2021-12-24 PROCEDURE — 80048 BASIC METABOLIC PNL TOTAL CA: CPT | Performed by: HOSPITALIST

## 2021-12-24 RX ADMIN — ROPINIROLE HYDROCHLORIDE 0.25 MG: 0.5 TABLET, FILM COATED ORAL at 21:09

## 2021-12-24 RX ADMIN — OLANZAPINE 5 MG: 5 TABLET ORAL at 21:09

## 2021-12-24 RX ADMIN — DICLOFENAC SODIUM 4 G: 10 GEL TOPICAL at 16:18

## 2021-12-24 RX ADMIN — DICLOFENAC SODIUM 4 G: 10 GEL TOPICAL at 08:23

## 2021-12-24 RX ADMIN — PROPAFENONE HYDROCHLORIDE 150 MG: 150 TABLET, COATED ORAL at 08:23

## 2021-12-24 RX ADMIN — FAMOTIDINE 10 MG: 20 TABLET, FILM COATED ORAL at 06:12

## 2021-12-24 RX ADMIN — SODIUM CHLORIDE, PRESERVATIVE FREE 10 ML: 5 INJECTION INTRAVENOUS at 21:00

## 2021-12-24 RX ADMIN — OLANZAPINE 5 MG: 5 TABLET ORAL at 08:23

## 2021-12-24 RX ADMIN — VANCOMYCIN HYDROCHLORIDE 125 MG: 125 CAPSULE ORAL at 16:17

## 2021-12-24 RX ADMIN — GABAPENTIN 300 MG: 300 CAPSULE ORAL at 21:09

## 2021-12-24 RX ADMIN — VANCOMYCIN HYDROCHLORIDE 125 MG: 125 CAPSULE ORAL at 08:23

## 2021-12-24 RX ADMIN — GABAPENTIN 300 MG: 300 CAPSULE ORAL at 16:18

## 2021-12-24 RX ADMIN — LIDOCAINE 1 PATCH: 50 PATCH TOPICAL at 08:24

## 2021-12-24 RX ADMIN — ASPIRIN 81 MG: 81 TABLET, COATED ORAL at 08:23

## 2021-12-24 RX ADMIN — PROPAFENONE HYDROCHLORIDE 150 MG: 150 TABLET, COATED ORAL at 21:09

## 2021-12-24 RX ADMIN — LISINOPRIL 5 MG: 5 TABLET ORAL at 08:23

## 2021-12-24 RX ADMIN — FAMOTIDINE 10 MG: 20 TABLET, FILM COATED ORAL at 16:58

## 2021-12-24 RX ADMIN — VANCOMYCIN HYDROCHLORIDE 125 MG: 125 CAPSULE ORAL at 21:09

## 2021-12-24 RX ADMIN — SODIUM CHLORIDE, PRESERVATIVE FREE 10 ML: 5 INJECTION INTRAVENOUS at 08:24

## 2021-12-24 RX ADMIN — VANCOMYCIN HYDROCHLORIDE 125 MG: 125 CAPSULE ORAL at 02:57

## 2021-12-24 RX ADMIN — GABAPENTIN 300 MG: 300 CAPSULE ORAL at 08:23

## 2021-12-25 PROBLEM — A04.72 C. DIFFICILE COLITIS: Status: ACTIVE | Noted: 2021-12-25

## 2021-12-25 RX ADMIN — DICLOFENAC SODIUM 4 G: 10 GEL TOPICAL at 16:55

## 2021-12-25 RX ADMIN — VANCOMYCIN HYDROCHLORIDE 125 MG: 125 CAPSULE ORAL at 12:12

## 2021-12-25 RX ADMIN — Medication 5 MG: at 01:15

## 2021-12-25 RX ADMIN — FAMOTIDINE 10 MG: 20 TABLET, FILM COATED ORAL at 18:36

## 2021-12-25 RX ADMIN — GABAPENTIN 300 MG: 300 CAPSULE ORAL at 21:15

## 2021-12-25 RX ADMIN — VANCOMYCIN HYDROCHLORIDE 125 MG: 125 CAPSULE ORAL at 23:37

## 2021-12-25 RX ADMIN — PROPAFENONE HYDROCHLORIDE 150 MG: 150 TABLET, COATED ORAL at 21:16

## 2021-12-25 RX ADMIN — SODIUM CHLORIDE, PRESERVATIVE FREE 10 ML: 5 INJECTION INTRAVENOUS at 21:16

## 2021-12-25 RX ADMIN — DICLOFENAC SODIUM 4 G: 10 GEL TOPICAL at 04:24

## 2021-12-25 RX ADMIN — OLANZAPINE 5 MG: 5 TABLET ORAL at 21:16

## 2021-12-25 RX ADMIN — ASPIRIN 81 MG: 81 TABLET, COATED ORAL at 12:10

## 2021-12-25 RX ADMIN — SODIUM CHLORIDE, PRESERVATIVE FREE 10 ML: 5 INJECTION INTRAVENOUS at 12:09

## 2021-12-25 RX ADMIN — ROPINIROLE HYDROCHLORIDE 0.25 MG: 0.5 TABLET, FILM COATED ORAL at 21:15

## 2021-12-25 RX ADMIN — PROPAFENONE HYDROCHLORIDE 150 MG: 150 TABLET, COATED ORAL at 12:11

## 2021-12-25 RX ADMIN — DICLOFENAC SODIUM 4 G: 10 GEL TOPICAL at 21:16

## 2021-12-25 RX ADMIN — FAMOTIDINE 10 MG: 20 TABLET, FILM COATED ORAL at 12:30

## 2021-12-25 RX ADMIN — LISINOPRIL 5 MG: 5 TABLET ORAL at 12:14

## 2021-12-25 RX ADMIN — VANCOMYCIN HYDROCHLORIDE 125 MG: 125 CAPSULE ORAL at 03:35

## 2021-12-25 RX ADMIN — GABAPENTIN 300 MG: 300 CAPSULE ORAL at 12:13

## 2021-12-25 RX ADMIN — GABAPENTIN 300 MG: 300 CAPSULE ORAL at 18:36

## 2021-12-25 RX ADMIN — Medication 5 MG: at 21:15

## 2021-12-25 RX ADMIN — VANCOMYCIN HYDROCHLORIDE 125 MG: 125 CAPSULE ORAL at 18:36

## 2021-12-26 RX ADMIN — VANCOMYCIN HYDROCHLORIDE 125 MG: 125 CAPSULE ORAL at 12:07

## 2021-12-26 RX ADMIN — FAMOTIDINE 10 MG: 20 TABLET, FILM COATED ORAL at 07:04

## 2021-12-26 RX ADMIN — ROPINIROLE HYDROCHLORIDE 0.25 MG: 0.5 TABLET, FILM COATED ORAL at 20:38

## 2021-12-26 RX ADMIN — SODIUM CHLORIDE, PRESERVATIVE FREE 10 ML: 5 INJECTION INTRAVENOUS at 20:38

## 2021-12-26 RX ADMIN — VANCOMYCIN HYDROCHLORIDE 125 MG: 125 CAPSULE ORAL at 06:59

## 2021-12-26 RX ADMIN — OLANZAPINE 5 MG: 5 TABLET ORAL at 09:00

## 2021-12-26 RX ADMIN — ACETAMINOPHEN 650 MG: 325 TABLET, FILM COATED ORAL at 07:04

## 2021-12-26 RX ADMIN — OLANZAPINE 5 MG: 5 TABLET ORAL at 20:38

## 2021-12-26 RX ADMIN — ASPIRIN 81 MG: 81 TABLET, COATED ORAL at 09:00

## 2021-12-26 RX ADMIN — FAMOTIDINE 10 MG: 20 TABLET, FILM COATED ORAL at 17:22

## 2021-12-26 RX ADMIN — LISINOPRIL 5 MG: 5 TABLET ORAL at 10:49

## 2021-12-26 RX ADMIN — PROPAFENONE HYDROCHLORIDE 150 MG: 150 TABLET, COATED ORAL at 08:59

## 2021-12-26 RX ADMIN — VANCOMYCIN HYDROCHLORIDE 125 MG: 125 CAPSULE ORAL at 23:17

## 2021-12-26 RX ADMIN — VANCOMYCIN HYDROCHLORIDE 125 MG: 125 CAPSULE ORAL at 17:22

## 2021-12-26 RX ADMIN — GABAPENTIN 300 MG: 300 CAPSULE ORAL at 20:38

## 2021-12-26 RX ADMIN — GABAPENTIN 300 MG: 300 CAPSULE ORAL at 17:22

## 2021-12-26 RX ADMIN — SODIUM CHLORIDE, PRESERVATIVE FREE 10 ML: 5 INJECTION INTRAVENOUS at 09:00

## 2021-12-26 RX ADMIN — Medication 5 MG: at 20:38

## 2021-12-26 RX ADMIN — PROPAFENONE HYDROCHLORIDE 150 MG: 150 TABLET, COATED ORAL at 20:38

## 2021-12-26 RX ADMIN — GABAPENTIN 300 MG: 300 CAPSULE ORAL at 08:59

## 2021-12-27 RX ADMIN — SODIUM CHLORIDE, PRESERVATIVE FREE 10 ML: 5 INJECTION INTRAVENOUS at 20:19

## 2021-12-27 RX ADMIN — VANCOMYCIN HYDROCHLORIDE 125 MG: 125 CAPSULE ORAL at 12:27

## 2021-12-27 RX ADMIN — VANCOMYCIN HYDROCHLORIDE 125 MG: 125 CAPSULE ORAL at 06:22

## 2021-12-27 RX ADMIN — ACETAMINOPHEN 650 MG: 325 TABLET, FILM COATED ORAL at 06:22

## 2021-12-27 RX ADMIN — OLANZAPINE 5 MG: 5 TABLET ORAL at 08:56

## 2021-12-27 RX ADMIN — GABAPENTIN 300 MG: 300 CAPSULE ORAL at 17:29

## 2021-12-27 RX ADMIN — LISINOPRIL 5 MG: 5 TABLET ORAL at 08:56

## 2021-12-27 RX ADMIN — FAMOTIDINE 10 MG: 20 TABLET, FILM COATED ORAL at 17:29

## 2021-12-27 RX ADMIN — VANCOMYCIN HYDROCHLORIDE 125 MG: 125 CAPSULE ORAL at 17:29

## 2021-12-27 RX ADMIN — OLANZAPINE 5 MG: 5 TABLET ORAL at 20:19

## 2021-12-27 RX ADMIN — DICLOFENAC SODIUM 4 G: 10 GEL TOPICAL at 06:23

## 2021-12-27 RX ADMIN — GABAPENTIN 300 MG: 300 CAPSULE ORAL at 20:19

## 2021-12-27 RX ADMIN — VANCOMYCIN HYDROCHLORIDE 125 MG: 125 CAPSULE ORAL at 22:58

## 2021-12-27 RX ADMIN — PROPAFENONE HYDROCHLORIDE 150 MG: 150 TABLET, COATED ORAL at 20:19

## 2021-12-27 RX ADMIN — ASPIRIN 81 MG: 81 TABLET, COATED ORAL at 08:56

## 2021-12-27 RX ADMIN — ROPINIROLE HYDROCHLORIDE 0.25 MG: 0.5 TABLET, FILM COATED ORAL at 20:19

## 2021-12-27 RX ADMIN — GABAPENTIN 300 MG: 300 CAPSULE ORAL at 08:56

## 2021-12-27 RX ADMIN — Medication 5 MG: at 22:58

## 2021-12-27 RX ADMIN — LIDOCAINE 1 PATCH: 50 PATCH TOPICAL at 08:55

## 2021-12-27 RX ADMIN — SODIUM CHLORIDE, PRESERVATIVE FREE 10 ML: 5 INJECTION INTRAVENOUS at 08:56

## 2021-12-27 RX ADMIN — FAMOTIDINE 10 MG: 20 TABLET, FILM COATED ORAL at 06:21

## 2021-12-27 RX ADMIN — DICLOFENAC SODIUM 4 G: 10 GEL TOPICAL at 20:19

## 2021-12-27 RX ADMIN — PROPAFENONE HYDROCHLORIDE 150 MG: 150 TABLET, COATED ORAL at 08:56

## 2021-12-28 ENCOUNTER — TELEPHONE (OUTPATIENT)
Dept: FAMILY MEDICINE CLINIC | Facility: CLINIC | Age: 86
End: 2021-12-28

## 2021-12-28 RX ADMIN — SODIUM CHLORIDE, PRESERVATIVE FREE 10 ML: 5 INJECTION INTRAVENOUS at 20:21

## 2021-12-28 RX ADMIN — OLANZAPINE 5 MG: 5 TABLET ORAL at 11:03

## 2021-12-28 RX ADMIN — GABAPENTIN 300 MG: 300 CAPSULE ORAL at 20:19

## 2021-12-28 RX ADMIN — PROPAFENONE HYDROCHLORIDE 150 MG: 150 TABLET, COATED ORAL at 10:59

## 2021-12-28 RX ADMIN — FAMOTIDINE 10 MG: 20 TABLET, FILM COATED ORAL at 18:45

## 2021-12-28 RX ADMIN — GABAPENTIN 300 MG: 300 CAPSULE ORAL at 10:59

## 2021-12-28 RX ADMIN — ROPINIROLE HYDROCHLORIDE 0.25 MG: 0.5 TABLET, FILM COATED ORAL at 20:19

## 2021-12-28 RX ADMIN — ASPIRIN 81 MG: 81 TABLET, COATED ORAL at 10:59

## 2021-12-28 RX ADMIN — VANCOMYCIN HYDROCHLORIDE 125 MG: 125 CAPSULE ORAL at 12:13

## 2021-12-28 RX ADMIN — LISINOPRIL 5 MG: 5 TABLET ORAL at 10:59

## 2021-12-28 RX ADMIN — GABAPENTIN 300 MG: 300 CAPSULE ORAL at 16:57

## 2021-12-28 RX ADMIN — FAMOTIDINE 10 MG: 20 TABLET, FILM COATED ORAL at 06:30

## 2021-12-28 RX ADMIN — VANCOMYCIN HYDROCHLORIDE 125 MG: 125 CAPSULE ORAL at 06:30

## 2021-12-28 RX ADMIN — DICLOFENAC SODIUM 4 G: 10 GEL TOPICAL at 20:20

## 2021-12-28 RX ADMIN — OLANZAPINE 5 MG: 5 TABLET ORAL at 20:20

## 2021-12-28 RX ADMIN — PROPAFENONE HYDROCHLORIDE 150 MG: 150 TABLET, COATED ORAL at 20:19

## 2021-12-28 RX ADMIN — VANCOMYCIN HYDROCHLORIDE 125 MG: 125 CAPSULE ORAL at 18:45

## 2021-12-28 NOTE — TELEPHONE ENCOUNTER
Pt called back and wanted us know that she has gown on with a split down the back, no shoes, socks only, no make up and only one pair of undergarments that she has to wash out in the sink everyday.  Pt called wanting us to help her get discharged from the hospital.

## 2021-12-28 NOTE — TELEPHONE ENCOUNTER
FYI:  Pt called wanting us to help her get discharged from the hospital. Pt states she was attempting to get her house ready to sale this spring when she was admitted to the hospital. I stay on the phone with the pt for about 25 minutes.

## 2021-12-29 PROBLEM — N17.9 AKI (ACUTE KIDNEY INJURY): Status: RESOLVED | Noted: 2021-12-21 | Resolved: 2021-12-29

## 2021-12-29 RX ADMIN — GABAPENTIN 300 MG: 300 CAPSULE ORAL at 16:40

## 2021-12-29 RX ADMIN — GABAPENTIN 300 MG: 300 CAPSULE ORAL at 09:46

## 2021-12-29 RX ADMIN — ASPIRIN 81 MG: 81 TABLET, COATED ORAL at 09:46

## 2021-12-29 RX ADMIN — VANCOMYCIN HYDROCHLORIDE 125 MG: 125 CAPSULE ORAL at 06:39

## 2021-12-29 RX ADMIN — PROPAFENONE HYDROCHLORIDE 150 MG: 150 TABLET, COATED ORAL at 20:26

## 2021-12-29 RX ADMIN — VANCOMYCIN HYDROCHLORIDE 125 MG: 125 CAPSULE ORAL at 12:49

## 2021-12-29 RX ADMIN — Medication 5 MG: at 22:41

## 2021-12-29 RX ADMIN — ROPINIROLE HYDROCHLORIDE 0.25 MG: 0.5 TABLET, FILM COATED ORAL at 20:26

## 2021-12-29 RX ADMIN — GABAPENTIN 300 MG: 300 CAPSULE ORAL at 20:30

## 2021-12-29 RX ADMIN — VANCOMYCIN HYDROCHLORIDE 125 MG: 125 CAPSULE ORAL at 23:05

## 2021-12-29 RX ADMIN — PROPAFENONE HYDROCHLORIDE 150 MG: 150 TABLET, COATED ORAL at 09:46

## 2021-12-29 RX ADMIN — ACETAMINOPHEN 650 MG: 325 TABLET, FILM COATED ORAL at 04:24

## 2021-12-29 RX ADMIN — VANCOMYCIN HYDROCHLORIDE 125 MG: 125 CAPSULE ORAL at 01:02

## 2021-12-29 RX ADMIN — FAMOTIDINE 10 MG: 20 TABLET, FILM COATED ORAL at 06:42

## 2021-12-29 RX ADMIN — LISINOPRIL 5 MG: 5 TABLET ORAL at 09:46

## 2021-12-29 RX ADMIN — OLANZAPINE 5 MG: 5 TABLET ORAL at 20:26

## 2021-12-29 RX ADMIN — FAMOTIDINE 10 MG: 20 TABLET, FILM COATED ORAL at 16:40

## 2021-12-29 RX ADMIN — Medication 5 MG: at 01:02

## 2021-12-29 RX ADMIN — OLANZAPINE 5 MG: 5 TABLET ORAL at 10:07

## 2021-12-29 RX ADMIN — VANCOMYCIN HYDROCHLORIDE 125 MG: 125 CAPSULE ORAL at 17:38

## 2021-12-30 RX ADMIN — GABAPENTIN 300 MG: 300 CAPSULE ORAL at 20:50

## 2021-12-30 RX ADMIN — GABAPENTIN 300 MG: 300 CAPSULE ORAL at 18:10

## 2021-12-30 RX ADMIN — FAMOTIDINE 10 MG: 20 TABLET, FILM COATED ORAL at 18:10

## 2021-12-30 RX ADMIN — OLANZAPINE 5 MG: 5 TABLET ORAL at 12:12

## 2021-12-30 RX ADMIN — VANCOMYCIN HYDROCHLORIDE 125 MG: 125 CAPSULE ORAL at 12:12

## 2021-12-30 RX ADMIN — GABAPENTIN 300 MG: 300 CAPSULE ORAL at 12:12

## 2021-12-30 RX ADMIN — Medication 5 MG: at 23:37

## 2021-12-30 RX ADMIN — OLANZAPINE 5 MG: 5 TABLET ORAL at 20:49

## 2021-12-30 RX ADMIN — PROPAFENONE HYDROCHLORIDE 150 MG: 150 TABLET, COATED ORAL at 12:11

## 2021-12-30 RX ADMIN — ROPINIROLE HYDROCHLORIDE 0.25 MG: 0.5 TABLET, FILM COATED ORAL at 20:47

## 2021-12-30 RX ADMIN — LISINOPRIL 5 MG: 5 TABLET ORAL at 12:12

## 2021-12-30 RX ADMIN — FAMOTIDINE 10 MG: 20 TABLET, FILM COATED ORAL at 06:49

## 2021-12-30 RX ADMIN — ASPIRIN 81 MG: 81 TABLET, COATED ORAL at 12:12

## 2021-12-30 RX ADMIN — DICLOFENAC SODIUM 4 G: 10 GEL TOPICAL at 20:49

## 2021-12-30 RX ADMIN — PROPAFENONE HYDROCHLORIDE 150 MG: 150 TABLET, COATED ORAL at 20:50

## 2021-12-31 VITALS
SYSTOLIC BLOOD PRESSURE: 133 MMHG | HEIGHT: 66 IN | OXYGEN SATURATION: 95 % | BODY MASS INDEX: 20.87 KG/M2 | WEIGHT: 129.85 LBS | RESPIRATION RATE: 20 BRPM | DIASTOLIC BLOOD PRESSURE: 57 MMHG | TEMPERATURE: 98 F | HEART RATE: 94 BPM

## 2021-12-31 RX ORDER — FAMOTIDINE 20 MG/1
10 TABLET, FILM COATED ORAL
Qty: 30 TABLET | Refills: 0 | Status: SHIPPED | OUTPATIENT
Start: 2021-12-31 | End: 2022-01-30

## 2021-12-31 RX ORDER — PROPAFENONE HYDROCHLORIDE 150 MG/1
150 TABLET, COATED ORAL 2 TIMES DAILY
Qty: 60 TABLET | Refills: 0 | Status: SHIPPED | OUTPATIENT
Start: 2021-12-31 | End: 2022-01-30

## 2021-12-31 RX ORDER — ACEBUTOLOL HYDROCHLORIDE 200 MG/1
200 CAPSULE ORAL DAILY
Qty: 30 CAPSULE | Refills: 0 | Status: SHIPPED | OUTPATIENT
Start: 2021-12-31

## 2021-12-31 RX ORDER — LIDOCAINE 50 MG/G
1 PATCH TOPICAL EVERY 24 HOURS
Qty: 15 PATCH | Refills: 0 | Status: SHIPPED | OUTPATIENT
Start: 2021-12-31

## 2021-12-31 RX ORDER — LISINOPRIL 5 MG/1
5 TABLET ORAL DAILY
Qty: 90 TABLET | Refills: 0 | Status: SHIPPED | OUTPATIENT
Start: 2021-12-31

## 2021-12-31 RX ORDER — ROPINIROLE 0.25 MG/1
0.25 TABLET, FILM COATED ORAL 2 TIMES DAILY
Qty: 60 TABLET | Refills: 0 | Status: SHIPPED | OUTPATIENT
Start: 2021-12-31

## 2021-12-31 RX ORDER — OLANZAPINE 5 MG/1
5 TABLET ORAL 2 TIMES DAILY
Qty: 60 TABLET | Refills: 0 | Status: SHIPPED | OUTPATIENT
Start: 2021-12-31

## 2021-12-31 RX ORDER — ASPIRIN 81 MG/1
81 TABLET ORAL DAILY
Qty: 30 TABLET | Refills: 0 | Status: SHIPPED | OUTPATIENT
Start: 2021-12-31

## 2021-12-31 RX ORDER — GABAPENTIN 300 MG/1
600 CAPSULE ORAL 3 TIMES DAILY
Qty: 90 CAPSULE | Refills: 0 | Status: SHIPPED | OUTPATIENT
Start: 2021-12-31

## 2021-12-31 RX ORDER — TIZANIDINE 2 MG/1
2 TABLET ORAL EVERY 8 HOURS PRN
Qty: 90 TABLET | Refills: 0 | Status: SHIPPED | OUTPATIENT
Start: 2021-12-31

## 2021-12-31 RX ADMIN — GABAPENTIN 300 MG: 300 CAPSULE ORAL at 10:39

## 2021-12-31 RX ADMIN — OLANZAPINE 5 MG: 5 TABLET ORAL at 10:39

## 2021-12-31 RX ADMIN — FAMOTIDINE 10 MG: 20 TABLET, FILM COATED ORAL at 06:51

## 2021-12-31 RX ADMIN — ASPIRIN 81 MG: 81 TABLET, COATED ORAL at 10:48

## 2021-12-31 RX ADMIN — LISINOPRIL 5 MG: 5 TABLET ORAL at 10:39

## 2021-12-31 RX ADMIN — PROPAFENONE HYDROCHLORIDE 150 MG: 150 TABLET, COATED ORAL at 10:39

## 2021-12-31 RX ADMIN — DICLOFENAC SODIUM 4 G: 10 GEL TOPICAL at 10:40

## 2021-12-31 RX ADMIN — ACETAMINOPHEN 1000 MG: 500 TABLET ORAL at 06:47

## 2022-01-11 ENCOUNTER — HOSPITAL ENCOUNTER (EMERGENCY)
Facility: HOSPITAL | Age: 87
Discharge: NURSING, REHAB OR LONG-TERM CARE | End: 2022-01-11
Attending: EMERGENCY MEDICINE | Admitting: EMERGENCY MEDICINE

## 2022-01-11 ENCOUNTER — APPOINTMENT (OUTPATIENT)
Dept: CT IMAGING | Facility: HOSPITAL | Age: 87
End: 2022-01-11

## 2022-01-11 VITALS
OXYGEN SATURATION: 96 % | TEMPERATURE: 97.9 F | SYSTOLIC BLOOD PRESSURE: 106 MMHG | DIASTOLIC BLOOD PRESSURE: 46 MMHG | RESPIRATION RATE: 18 BRPM | HEART RATE: 128 BPM

## 2022-01-11 DIAGNOSIS — R55 SYNCOPE AND COLLAPSE: Primary | ICD-10-CM

## 2022-01-11 DIAGNOSIS — F03.91 DEMENTIA WITH BEHAVIORAL DISTURBANCE, UNSPECIFIED DEMENTIA TYPE: ICD-10-CM

## 2022-01-11 LAB
ALBUMIN SERPL-MCNC: 3.6 G/DL (ref 3.5–5.2)
ALBUMIN/GLOB SERPL: 1.3 G/DL
ALP SERPL-CCNC: 89 U/L (ref 39–117)
ALT SERPL W P-5'-P-CCNC: 10 U/L (ref 1–33)
ANION GAP SERPL CALCULATED.3IONS-SCNC: 8.9 MMOL/L (ref 5–15)
AST SERPL-CCNC: 18 U/L (ref 1–32)
BASOPHILS # BLD MANUAL: 0.08 10*3/MM3 (ref 0–0.2)
BASOPHILS NFR BLD MANUAL: 1 % (ref 0–1.5)
BILIRUB SERPL-MCNC: 0.6 MG/DL (ref 0–1.2)
BUN SERPL-MCNC: 12 MG/DL (ref 8–23)
BUN/CREAT SERPL: 16.9 (ref 7–25)
CALCIUM SPEC-SCNC: 8.9 MG/DL (ref 8.6–10.5)
CHLORIDE SERPL-SCNC: 97 MMOL/L (ref 98–107)
CO2 SERPL-SCNC: 28.1 MMOL/L (ref 22–29)
CREAT SERPL-MCNC: 0.71 MG/DL (ref 0.57–1)
DEPRECATED RDW RBC AUTO: 40.1 FL (ref 37–54)
EOSINOPHIL # BLD MANUAL: 1.22 10*3/MM3 (ref 0–0.4)
EOSINOPHIL NFR BLD MANUAL: 15.2 % (ref 0.3–6.2)
ERYTHROCYTE [DISTWIDTH] IN BLOOD BY AUTOMATED COUNT: 12.3 % (ref 12.3–15.4)
GFR SERPL CREATININE-BSD FRML MDRD: 78 ML/MIN/1.73
GLOBULIN UR ELPH-MCNC: 2.7 GM/DL
GLUCOSE SERPL-MCNC: 114 MG/DL (ref 65–99)
HCT VFR BLD AUTO: 35.6 % (ref 34–46.6)
HGB BLD-MCNC: 11.5 G/DL (ref 12–15.9)
HOLD SPECIMEN: NORMAL
HOLD SPECIMEN: NORMAL
LYMPHOCYTES # BLD MANUAL: 1.22 10*3/MM3 (ref 0.7–3.1)
LYMPHOCYTES NFR BLD MANUAL: 13.6 % (ref 5–12)
MCH RBC QN AUTO: 29 PG (ref 26.6–33)
MCHC RBC AUTO-ENTMCNC: 32.3 G/DL (ref 31.5–35.7)
MCV RBC AUTO: 89.7 FL (ref 79–97)
MONOCYTES # BLD: 1.09 10*3/MM3 (ref 0.1–0.9)
NEUTROPHILS # BLD AUTO: 4.41 10*3/MM3 (ref 1.7–7)
NEUTROPHILS NFR BLD MANUAL: 55.1 % (ref 42.7–76)
NRBC BLD AUTO-RTO: 0 /100 WBC (ref 0–0.2)
PLAT MORPH BLD: NORMAL
PLATELET # BLD AUTO: 202 10*3/MM3 (ref 140–450)
PMV BLD AUTO: 12.5 FL (ref 6–12)
POTASSIUM SERPL-SCNC: 4.8 MMOL/L (ref 3.5–5.2)
PROT SERPL-MCNC: 6.3 G/DL (ref 6–8.5)
QT INTERVAL: 432 MS
RBC # BLD AUTO: 3.97 10*6/MM3 (ref 3.77–5.28)
RBC MORPH BLD: NORMAL
SODIUM SERPL-SCNC: 134 MMOL/L (ref 136–145)
TROPONIN T SERPL-MCNC: <0.01 NG/ML (ref 0–0.03)
VARIANT LYMPHS NFR BLD MANUAL: 15.2 % (ref 19.6–45.3)
WBC MORPH BLD: NORMAL
WBC NRBC COR # BLD: 8.01 10*3/MM3 (ref 3.4–10.8)
WHOLE BLOOD HOLD SPECIMEN: NORMAL
WHOLE BLOOD HOLD SPECIMEN: NORMAL

## 2022-01-11 PROCEDURE — 93005 ELECTROCARDIOGRAM TRACING: CPT | Performed by: EMERGENCY MEDICINE

## 2022-01-11 PROCEDURE — 85007 BL SMEAR W/DIFF WBC COUNT: CPT | Performed by: EMERGENCY MEDICINE

## 2022-01-11 PROCEDURE — 80053 COMPREHEN METABOLIC PANEL: CPT | Performed by: EMERGENCY MEDICINE

## 2022-01-11 PROCEDURE — 93010 ELECTROCARDIOGRAM REPORT: CPT | Performed by: INTERNAL MEDICINE

## 2022-01-11 PROCEDURE — 93005 ELECTROCARDIOGRAM TRACING: CPT

## 2022-01-11 PROCEDURE — 99284 EMERGENCY DEPT VISIT MOD MDM: CPT

## 2022-01-11 PROCEDURE — 85025 COMPLETE CBC W/AUTO DIFF WBC: CPT | Performed by: EMERGENCY MEDICINE

## 2022-01-11 PROCEDURE — 70450 CT HEAD/BRAIN W/O DYE: CPT

## 2022-01-11 PROCEDURE — 84484 ASSAY OF TROPONIN QUANT: CPT | Performed by: EMERGENCY MEDICINE

## 2022-01-11 RX ORDER — OLANZAPINE 5 MG/1
5 TABLET, ORALLY DISINTEGRATING ORAL ONCE
Status: COMPLETED | OUTPATIENT
Start: 2022-01-11 | End: 2022-01-11

## 2022-01-11 RX ADMIN — SODIUM CHLORIDE 500 ML: 9 INJECTION, SOLUTION INTRAVENOUS at 13:06

## 2022-01-11 RX ADMIN — OLANZAPINE 5 MG: 5 TABLET, ORALLY DISINTEGRATING ORAL at 14:54

## 2022-01-11 NOTE — CASE MANAGEMENT/SOCIAL WORK
Spoke with Carito with [facility] to arrange ambulance transport back to [facility] in [place]. ETA of 1930. MARIAH GoffN RN

## 2022-01-11 NOTE — ED TRIAGE NOTES
.Pt masked on arrival, staff masked    Pt from assisted living, ems called for syncope, had gone to exercise, sat down and then had a syncopal episode at the table, loc for approx 30second, currently a&ox3

## 2022-01-11 NOTE — ED PROVIDER NOTES
" EMERGENCY DEPARTMENT ENCOUNTER    Room Number:  29/29  Date of encounter:  1/11/2022  PCP: Miki Khalil MD  Historian: Patient and EMS      HPI:  Chief Complaint: Syncopal episode  A complete HPI/ROS/PMH/PSH/SH/FH are unobtainable due to: Dementia    Context: Marce Williamson is a 86 y.o. female who presents to the ED c/o reported syncopal episode at the nursing facility today after going to physical therapy.  Apparently the patient had just finished and went to sit down when she passed out.  She did not injure herself, and she woke up quickly in her normal state.  She is not sure if this happened to her before, and says she feels \"just fine\" now.  She has no complaints and does not feel injured.  She remembers no chest pain or shortness of breath.  She does not have a headache.        PAST MEDICAL HISTORY  Active Ambulatory Problems     Diagnosis Date Noted   • Coronary arteriosclerosis in native artery 02/09/2016   • Chronic coronary artery disease 02/09/2016   • Chest pain 02/09/2016   • Palpitations 02/09/2016   • HTN (hypertension) 02/09/2016   • Ventricular premature beats 02/09/2016   • Shortness of breath 02/09/2016   • Disc disorder of cervical region 02/09/2016   • Cervical radiculopathy 02/09/2016   • Low back pain 02/09/2016   • Pain of lower extremity 02/09/2016   • Chronic neck pain 02/09/2016   • Pain in thoracic spine 02/09/2016   • Cough 02/09/2016   • Degeneration of intervertebral disc of lumbar region 02/09/2016   • Degeneration of intervertebral disc of thoracic region 02/09/2016   • Degeneration of intervertebral disc of thoracolumbar region 02/09/2016   • Dizziness 02/09/2016   • Dysphagia 02/09/2016   • Gastritis 02/09/2016   • Gastroesophageal reflux disease without esophagitis 02/09/2016   • Goiter 02/09/2016   • Left arm pain 02/09/2016   • Paroxysmal atrial fibrillation (HCC) 02/23/2016   • Degenerative cervical spinal stenosis 04/13/2016   • Atrial fibrillation (HCC) " 02/14/2016   • Epigastric pain 07/30/2013   • Abdominal pain 01/24/2014   • Other spondylosis with radiculopathy, cervical region 01/07/2016   • Constipation 07/07/2015   • Non-specific colitis 10/06/2015   • Other symptoms and signs involving the musculoskeletal system 03/02/2017   • Diverticulosis of intestine 10/06/2015   • Elevated troponin I level 02/15/2016   • 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   • Impingement syndrome of shoulder region 03/22/2017   • Irritable bowel syndrome 01/23/2013   • Left lower quadrant pain 08/27/2013   • Nausea 07/30/2013   • Paresthesia of lower extremity 03/02/2017   • Osteoarthritis of knee 07/22/2014   • Pulmonary venous congestion 02/14/2016   • Rotator cuff tear arthropathy 05/13/2015   • Acquired deformity of spine 08/16/2012   • Screening mammogram, encounter for 11/15/2018   • History of echocardiogram 02/22/2019   • History of stress test 01/23/2017   • Neuropathy 10/02/2019   • Left-sided chest wall pain 10/02/2019   • Pain of left breast 10/02/2019   • Urinary tract infection 11/05/2019   • Elevated hemoglobin A1c measurement 11/05/2019   • Prediabetes 11/05/2019   • Mood disorder (HCC) 12/17/2019   • Decubitus ulcer of sacral region, stage 2 (HCC) 12/17/2019   • Medicare annual wellness visit, subsequent 01/28/2020   • Arthralgia of multiple sites 01/28/2020   • Anxiety 03/17/2020   • Urinary frequency 06/04/2020   • Continuous leakage of urine 06/04/2020   • Wound, open, hip or thigh, right, sequela 07/01/2020   • Sciatica of right side associated with disorder of lumbar spine 10/07/2020   • Right medial knee pain 10/07/2020   • Acute cystitis without hematuria 11/10/2020   • Chronic pain 01/05/2021   • Dysuria    • Conductive hearing loss, bilateral    • RLS (restless legs syndrome) 02/23/2021   • Chronic hand pain, right 02/23/2021   • Varicose veins of both lower extremities 06/29/2021   • Memory disturbance 09/28/2021   • Dementia with behavioral disturbance (HCC)  12/16/2021   • C. difficile colitis 12/25/2021     Resolved Ambulatory Problems     Diagnosis Date Noted   • Abrasion of left arm 11/13/2019   • Acute bronchitis due to other specified organisms 11/13/2019   • Partial thickness burn of buttock 01/28/2020   • Eschar 06/04/2020   • Abrasion of right leg, initial encounter 08/11/2020   • REYES (acute kidney injury) ([facility]) 12/21/2021     Past Medical History:   Diagnosis Date   • Abdominal cramping    • Abdominal pain, left lateral    • Abnormal CT of the abdomen    • Allergic rhinitis    • Arthritis    • Back pain    • BMI 25.0-25.9,adult    • Breast lump    • Calf pain    • Cervical spondylosis    • DDD (degenerative disc disease), lumbar    • Difficulty swallowing    • Diverticulitis of colon    • Easy bruising    • Edema    • Elevated d-dimer    • Episodic tension-type headache, not intractable    • External hemorrhoids    • Fatigue    • Full thickness burn of trunk    • Generalized osteoarthritis of multiple sites    • Headache    • Heart murmur    • Heme positive stool    • Hiatal hernia    • High risk medication use    • History of colonoscopy    • History of leukocytosis    • History of pneumonia    • History of rectal bleeding    • History of transfusion    • Hypertension    • Irregular cardiac rhythm    • Left flank pain    • Left kidney mass    • Localized osteoarthritis of left shoulder    • Lumbar foraminal stenosis    • Middle ear effusion    • Neural foraminal stenosis of cervical spine    • Nontoxic single thyroid nodule    • Osteopenia    • Other screening mammogram    • Pain in both lower extremities    • Pain in joint of right hip    • Pain in right buttock    • Pain of both hip joints    • Pain of upper extremity    • Polyp of sigmoid colon    • Rectal pain    • Renal cyst, left    • Sciatic pain, right    • Short of breath on exertion    • Shoulder pain    • Skin lesion    • Swelling of lower extremity    • Throat mass    • Tinnitus, right    • Vitamin  B12 deficiency    • Vitamin C deficiency    • Vulvovaginitis    • Weight loss          PAST SURGICAL HISTORY  Past Surgical History:   Procedure Laterality Date   • ANTERIOR CERVICAL DISCECTOMY W/ FUSION N/A 4/13/2016    Procedure: C-4-C-6 CERVICAL DISCECTOMY ANTERIOR FUSION WITH INSTRUMENTATION;  Surgeon: Blaine Jim DO;  Location: [facility] OR;  Service:    • BACK SURGERY      Dr. Stringer 2001 and Dr. Warren 2002   • CHOLECYSTECTOMY     • COLONOSCOPY      4/6/17 Normal, 3/2013 with diverticuli Dr. Dacosta   • HERNIA REPAIR     • HYSTERECTOMY     • LUMBAR FUSION      L1-L3 fusion   • REPLACEMENT TOTAL KNEE Right    • SHOULDER SURGERY           FAMILY HISTORY  Family History   Problem Relation Age of Onset   • Heart failure Mother         CONGESTIVE   • Breast cancer Father    • Emphysema Father    • Hypertension Father    • Breast cancer Sister    • Other Sister         POLIO, 1963   • Diabetes Son    • Pancreatic cancer Son    • No Known Problems Other          SOCIAL HISTORY  Social History     Socioeconomic History   • Marital status:    Tobacco Use   • Smoking status: Never Smoker   • Smokeless tobacco: Never Used   Substance and Sexual Activity   • Alcohol use: No   • Drug use: No   • Sexual activity: Defer         ALLERGIES  Keflex  [cephalexin], Metoclopramide, Adhesive tape, and Pentazocine        REVIEW OF SYSTEMS  Review of Systems   Limited due to dementia      PHYSICAL EXAM    I have reviewed the triage vital signs and nursing notes.    ED Triage Vitals [01/11/22 1221]   Temp Heart Rate Resp BP SpO2   97.9 °F (36.6 °C) 61 18 122/52 95 %      Temp src Heart Rate Source Patient Position BP Location FiO2 (%)   -- -- -- -- --       Physical Exam  GENERAL: Awake and alert, pleasant first but then became easily agitated and confused  HENT: nares patent, NCAT.  Well-kept, with a hair done and make-up on  EYES: no scleral icterus  CV: regular rhythm, regular rate  RESPIRATORY: normal effort  ABDOMEN:  soft  MUSCULOSKELETAL: no deformity  NEURO: alert, moves all extremities, follows commands.  No focal deficits, however she is easily agitated and I see from the history in epic that she has history of dementia with behavioral disturbance.  I also see that she takes Zyprexa twice a day and probably has not been medicated today  SKIN: warm, dry        LAB RESULTS  Recent Results (from the past 24 hour(s))   ECG 12 Lead    Collection Time: 01/11/22 12:30 PM   Result Value Ref Range    QT Interval 432 ms   Comprehensive Metabolic Panel    Collection Time: 01/11/22 12:50 PM    Specimen: Blood   Result Value Ref Range    Glucose 114 (H) 65 - 99 mg/dL    BUN 12 8 - 23 mg/dL    Creatinine 0.71 0.57 - 1.00 mg/dL    Sodium 134 (L) 136 - 145 mmol/L    Potassium 4.8 3.5 - 5.2 mmol/L    Chloride 97 (L) 98 - 107 mmol/L    CO2 28.1 22.0 - 29.0 mmol/L    Calcium 8.9 8.6 - 10.5 mg/dL    Total Protein 6.3 6.0 - 8.5 g/dL    Albumin 3.60 3.50 - 5.20 g/dL    ALT (SGPT) 10 1 - 33 U/L    AST (SGOT) 18 1 - 32 U/L    Alkaline Phosphatase 89 39 - 117 U/L    Total Bilirubin 0.6 0.0 - 1.2 mg/dL    eGFR Non African Amer 78 >60 mL/min/1.73    Globulin 2.7 gm/dL    A/G Ratio 1.3 g/dL    BUN/Creatinine Ratio 16.9 7.0 - 25.0    Anion Gap 8.9 5.0 - 15.0 mmol/L   CBC Auto Differential    Collection Time: 01/11/22 12:50 PM    Specimen: Blood   Result Value Ref Range    WBC 8.01 3.40 - 10.80 10*3/mm3    RBC 3.97 3.77 - 5.28 10*6/mm3    Hemoglobin 11.5 (L) 12.0 - 15.9 g/dL    Hematocrit 35.6 34.0 - 46.6 %    MCV 89.7 79.0 - 97.0 fL    MCH 29.0 26.6 - 33.0 pg    MCHC 32.3 31.5 - 35.7 g/dL    RDW 12.3 12.3 - 15.4 %    RDW-SD 40.1 37.0 - 54.0 fl    MPV 12.5 (H) 6.0 - 12.0 fL    Platelets 202 140 - 450 10*3/mm3    nRBC 0.0 0.0 - 0.2 /100 WBC   Green Top (Gel)    Collection Time: 01/11/22 12:50 PM   Result Value Ref Range    Extra Tube Hold for add-ons.    Lavender Top    Collection Time: 01/11/22 12:50 PM   Result Value Ref Range    Extra Tube hold for  add-on    Gold Top - SST    Collection Time: 01/11/22 12:50 PM   Result Value Ref Range    Extra Tube Hold for add-ons.    Light Blue Top    Collection Time: 01/11/22 12:50 PM   Result Value Ref Range    Extra Tube hold for add-on    Troponin    Collection Time: 01/11/22 12:50 PM    Specimen: Blood   Result Value Ref Range    Troponin T <0.010 0.000 - 0.030 ng/mL       Ordered the above labs and independently reviewed the results.        RADIOLOGY  CT Head Without Contrast    Result Date: 1/11/2022  EMERGENCY NONCONTRAST HEAD CT 01/11/2022  CLINICAL HISTORY: Syncope, patient found down.  TECHNIQUE: Spiral CT images were obtained from the base of skull to the vertex without intravenous contrast. Images were reformatted and submitted in 3 mm thick axial CT sections with brain algorithm and 2 mm thick sagittal and coronal reconstructions were performed and submitted in brain algorithm.  COMPARISON: This is correlated to a noncontrast head CT from [facility] 12/15/2021.  FINDINGS: There is mild low-density in the periventricular white matter consistent with mild small vessel disease. There is a focal 12 x 5 mm area of encephalomalacia in the posterior medial right cerebellum compatible with a small right PICA territory infarct unchanged since prior head CT 12/15/2021. The remainder of the brain parenchyma is normal in attenuation. The ventricles are normal in size. I see no focal mass effect. There is no midline shift. No extra-axial fluid collections are identified. There is no evidence of acute intracranial hemorrhage. No acute skull fractures identified. The calvarium and skull base are normal in appearance. There are calcified plaques in the intracranial segments of distal vertebral arteries and extensive calcified plaques in the cavernous and supracavernous segments of the internal carotid arteries bilaterally.      1. No acute intracranial abnormality seen with no change when compared to prior head  CT from [facility] 12/15/2021. 2. There is mild small vessel disease in the cerebral white matter and a 12 x 5 mm old posterior medial right cerebellar infarct in the right PICA territory and prominent calcified plaques in the intracranial segments of distal vertebral arteries and cavernous and supracavernous segments of the internal carotid arteries bilaterally. The remainder of the head CT is within normal limits. Specifically no acute skull fracture or intracranial hemorrhage is identified.  Radiation dose reduction techniques were utilized, including automated exposure control and exposure modulation based on body size.         I ordered the above noted radiological studies. Reviewed by me and discussed with radiologist.  See dictation for official radiology interpretation.      PROCEDURES    Procedures      MEDICATIONS GIVEN IN ER    Medications   sodium chloride 0.9 % bolus 500 mL (0 mL Intravenous Stopped 1/11/22 1336)   OLANZapine zydis (zyPREXA) disintegrating tablet 5 mg (5 mg Oral Given 1/11/22 1454)         PROGRESS, DATA ANALYSIS, CONSULTS, AND MEDICAL DECISION MAKING    All labs have been independently reviewed by me.  All radiology studies have been reviewed by me and discussed with radiologist dictating the report.   EKG's independently viewed and interpreted by me.  Discussion below represents my analysis of pertinent findings related to patient's condition, differential diagnosis, treatment plan and final disposition.        ED Course as of 01/11/22 1544   Tue Jan 11, 2022   1542 CBC and chemistry unremarkable, troponin normal [DP]   1543 EKG on arrival  Normal sinus rhythm  Normal DC, QRS and QT  There are some subtle ST segment elevations in the inferior leads with no reciprocal changes, and appear to be due to artifact.  When compared to previous dated October 26, 2021, there is a very similar appearance.  I do not believe this represents cardiac ischemia. [DP]   1543 Patient has  history of dementia with behavioral disturbance she is pretty agitated at this point.  I have given her a dose of her home Zyprexa.  Syncopal episode appears to have been vagal think she safe for discharge back to the nursing home [DP]      ED Course User Index  [DP] Branden Thomas MD           PPE: The patient wore a surgical mask throughout the entire patient encounter. I wore an N95.    AS OF 15:44 EST VITALS:    BP - 106/46  HR - (!) 128  TEMP - 97.9 °F (36.6 °C)  O2 SATS - 96%        DIAGNOSIS  Final diagnoses:   Syncope and collapse   Dementia with behavioral disturbance, unspecified dementia type (HCC)         DISPOSITION  Discharge           Branden Thomas MD  01/11/22 4833

## 2022-01-12 NOTE — ED NOTES
Pt left ED with EMS to home. IV removed. NAD noted at this time of discharge.     Sigrid Jackson, RN  01/11/22 1917

## 2022-01-13 ENCOUNTER — TELEPHONE (OUTPATIENT)
Dept: FAMILY MEDICINE CLINIC | Facility: CLINIC | Age: 87
End: 2022-01-13

## 2022-01-13 NOTE — TELEPHONE ENCOUNTER
It appears it is for afib but was started in the hospital this last visit.  This is to be followed by Dr Locke of cardiology.

## 2022-01-13 NOTE — TELEPHONE ENCOUNTER
Caller: JOY    Relationship: Other    Best call back number: [phone]    What is the best time to reach you: ANY    Who are you requesting to speak with (clinical staff, provider,  specific staff member): CLINICAL    Do you know the name of the person who called: [facility] NURSE    What was the call regarding: JOY INQUIRED WHAT THE PATIENT IS TAKING acebutolol (SECTRAL) 200 MG capsule.  IS IT FOR HYPERTENSION OR AFIB.    JOY IS TRYING TO GET AN ALTERNATIVE THAT THE PHARMACY CAN GET AND AS WELL THE INSURANCE WILL COVER/    Do you require a callback: YES

## 2022-03-23 ENCOUNTER — TELEPHONE (OUTPATIENT)
Dept: FAMILY MEDICINE CLINIC | Facility: CLINIC | Age: 87
End: 2022-03-23

## 2022-03-23 NOTE — TELEPHONE ENCOUNTER
Attempted to call patient back but she did not answer.  I was able to talk to her son Sergey Williamson and apparently she is staying at [facility] and is being followed by the psychiatrist and their physicians.  They have been changing her medicines to get her the take the medicines they are telling her that I have prescribed them.  Patient is not wanting to stay in this facility and has been doing things to try and get out such as telling family different stories, calling 911, and apparently this morning she called a  saying that she had fallen and needed help.  Apparently footage had been reviewed and there was no fall.  At this time I have no abilities to do any treatment therapy or prescriptions for this patient.

## 2022-03-23 NOTE — TELEPHONE ENCOUNTER
Patient ask that you return her call at the following number [phone]. She states she needs to speak with you.

## 2022-04-05 NOTE — TELEPHONE ENCOUNTER
I called and LVM for patient to call the office back. In Care Everywhere is shows that this patient had an MRI at [facility] on 09/01/18. Patient will need to bring that disc to her appointment. Please advise.    [Time Spent: ___ minutes] : I have spent [unfilled] minutes of time on the encounter.

## 2022-04-22 ENCOUNTER — APPOINTMENT (OUTPATIENT)
Dept: CT IMAGING | Facility: HOSPITAL | Age: 87
End: 2022-04-22

## 2022-04-22 ENCOUNTER — HOSPITAL ENCOUNTER (EMERGENCY)
Facility: HOSPITAL | Age: 87
Discharge: NURSING, REHAB OR LONG-TERM CARE | End: 2022-04-22
Attending: EMERGENCY MEDICINE | Admitting: EMERGENCY MEDICINE

## 2022-04-22 VITALS
HEART RATE: 83 BPM | BODY MASS INDEX: 20.96 KG/M2 | HEIGHT: 66 IN | SYSTOLIC BLOOD PRESSURE: 123 MMHG | DIASTOLIC BLOOD PRESSURE: 67 MMHG | RESPIRATION RATE: 16 BRPM | OXYGEN SATURATION: 97 % | TEMPERATURE: 97 F

## 2022-04-22 DIAGNOSIS — F03.90 DEMENTIA WITHOUT BEHAVIORAL DISTURBANCE, UNSPECIFIED DEMENTIA TYPE: ICD-10-CM

## 2022-04-22 DIAGNOSIS — S09.90XA CLOSED HEAD INJURY, INITIAL ENCOUNTER: ICD-10-CM

## 2022-04-22 DIAGNOSIS — S01.01XA LACERATION OF SCALP, INITIAL ENCOUNTER: ICD-10-CM
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
## 2022-04-22 PROCEDURE — 99283 EMERGENCY DEPT VISIT LOW MDM: CPT

## 2022-04-22 PROCEDURE — 72125 CT NECK SPINE W/O DYE: CPT

## 2022-04-22 PROCEDURE — 70450 CT HEAD/BRAIN W/O DYE: CPT

## 2022-04-22 RX ORDER — LIDOCAINE HYDROCHLORIDE AND EPINEPHRINE 10; 10 MG/ML; UG/ML
10 INJECTION, SOLUTION INFILTRATION; PERINEURAL ONCE
Status: COMPLETED | OUTPATIENT
Start: 2022-04-22 | End: 2022-04-22

## 2022-04-22 RX ADMIN — LIDOCAINE HYDROCHLORIDE,EPINEPHRINE BITARTRATE 10 ML: 10; .01 INJECTION, SOLUTION INFILTRATION; PERINEURAL at 05:09

## 2022-04-22 NOTE — DISCHARGE INSTRUCTIONS
Keep laceration clean and dry, apply antibiotic ointment once or twice daily, watch carefully for signs of infection, sutures need to be removed in 10-14 days. Return to care if any complications.

## 2022-04-22 NOTE — ED NOTES
Pt to triage from [facility] via [employer] [facility] [id number] with c/o fall.  Staff told ems pt was walking in dining room and then was on the floor.  Pt did strike head, no LOC, no blood thinners.  Pt has small laceration noted to posterior scalp.  Pt wearing mask in triage. Triage personnel wore appropriate PPE

## 2022-04-22 NOTE — ED PROVIDER NOTES
EMERGENCY DEPARTMENT ENCOUNTER    CHIEF COMPLAINT  Chief Complaint: Fall  History given by: Nursing home report  History limited by: Dementia  Room Number: 14/14  PMD: Tabitha Lan MD      HPI:  Pt is a 87 y.o. female who presents to the emergency room following a fall that occurred at her facility just prior to ED arrival.  The nursing home reports that she was walking in their dining area and then suddenly she was on the ground.  The mechanism of her fall was not completely known at this point however she does have a history of frequent falls.  She does report a posterior headache but denies neck pain, chest pain, extremity pain, abdominal pain, or back pain.    Duration: Just prior to ED arrival  Onset: Sudden  Location: Posterior scalp  Radiation: None  Quality: Dull/aching  Intensity/Severity: Moderate  Progression: Worsening  Associated Symptoms: None  Aggravating Factors: None  Alleviating Factors: None  Previous Episodes: Yes, history of frequent falls  Treatment before arrival: None    PAST MEDICAL HISTORY  Active Ambulatory Problems     Diagnosis Date Noted   • Coronary arteriosclerosis in native artery 02/09/2016   • Chronic coronary artery disease 02/09/2016   • Chest pain 02/09/2016   • Palpitations 02/09/2016   • HTN (hypertension) 02/09/2016   • Ventricular premature beats 02/09/2016   • Shortness of breath 02/09/2016   • Disc disorder of cervical region 02/09/2016   • Cervical radiculopathy 02/09/2016   • Low back pain 02/09/2016   • Pain of lower extremity 02/09/2016   • Chronic neck pain 02/09/2016   • Pain in thoracic spine 02/09/2016   • Cough 02/09/2016   • Degeneration of intervertebral disc of lumbar region 02/09/2016   • Degeneration of intervertebral disc of thoracic region 02/09/2016   • Degeneration of intervertebral disc of thoracolumbar region 02/09/2016   • Dizziness 02/09/2016   • Dysphagia 02/09/2016   • Gastritis 02/09/2016   • Gastroesophageal reflux disease without  esophagitis 02/09/2016   • Goiter 02/09/2016   • Left arm pain 02/09/2016   • Paroxysmal atrial fibrillation (HCC) 02/23/2016   • Degenerative cervical spinal stenosis 04/13/2016   • Atrial fibrillation (HCC) 02/14/2016   • Epigastric pain 07/30/2013   • Abdominal pain 01/24/2014   • Other spondylosis with radiculopathy, cervical region 01/07/2016   • Constipation 07/07/2015   • Non-specific colitis 10/06/2015   • Other symptoms and signs involving the musculoskeletal system 03/02/2017   • Diverticulosis of intestine 10/06/2015   • Elevated troponin I level 02/15/2016   • 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   • Impingement syndrome of shoulder region 03/22/2017   • Irritable bowel syndrome 01/23/2013   • Left lower quadrant pain 08/27/2013   • Nausea 07/30/2013   • Paresthesia of lower extremity 03/02/2017   • Osteoarthritis of knee 07/22/2014   • Pulmonary venous congestion 02/14/2016   • Rotator cuff tear arthropathy 05/13/2015   • Acquired deformity of spine 08/16/2012   • Screening mammogram, encounter for 11/15/2018   • History of echocardiogram 02/22/2019   • History of stress test 01/23/2017   • Neuropathy 10/02/2019   • Left-sided chest wall pain 10/02/2019   • Pain of left breast 10/02/2019   • Urinary tract infection 11/05/2019   • Elevated hemoglobin A1c measurement 11/05/2019   • Prediabetes 11/05/2019   • Mood disorder ([facility]) 12/17/2019   • Decubitus ulcer of sacral region, stage 2 ([facility]) 12/17/2019   • Medicare annual wellness visit, subsequent 01/28/2020   • Arthralgia of multiple sites 01/28/2020   • Anxiety 03/17/2020   • Urinary frequency 06/04/2020   • Continuous leakage of urine 06/04/2020   • Wound, open, hip or thigh, right, sequela 07/01/2020   • Sciatica of right side associated with disorder of lumbar spine 10/07/2020   • Right medial knee pain 10/07/2020   • Acute cystitis without hematuria 11/10/2020   • Chronic pain 01/05/2021   • Dysuria    • Conductive hearing loss, bilateral    • RLS (restless  legs syndrome) 02/23/2021   • Chronic hand pain, right 02/23/2021   • Varicose veins of both lower extremities 06/29/2021   • Memory disturbance 09/28/2021   • Dementia with behavioral disturbance ([facility]) 12/16/2021   • C. difficile colitis 12/25/2021     Resolved Ambulatory Problems     Diagnosis Date Noted   • Abrasion of left arm 11/13/2019   • Acute bronchitis due to other specified organisms 11/13/2019   • Partial thickness burn of buttock 01/28/2020   • Eschar 06/04/2020   • Abrasion of right leg, initial encounter 08/11/2020   • REYES (acute kidney injury) ([facility]) 12/21/2021     Past Medical History:   Diagnosis Date   • Abdominal cramping    • Abdominal pain, left lateral    • Abnormal CT of the abdomen    • Allergic rhinitis    • Arthritis    • Back pain    • BMI 25.0-25.9,adult    • Breast lump    • Calf pain    • Cervical spondylosis    • DDD (degenerative disc disease), lumbar    • Difficulty swallowing    • Diverticulitis of colon    • Easy bruising    • Edema    • Elevated d-dimer    • Episodic tension-type headache, not intractable    • External hemorrhoids    • Fatigue    • Full thickness burn of trunk    • Generalized osteoarthritis of multiple sites    • Headache    • Heart murmur    • Heme positive stool    • Hiatal hernia    • High risk medication use    • History of colonoscopy    • History of leukocytosis    • History of pneumonia    • History of rectal bleeding    • History of transfusion    • Hypertension    • Irregular cardiac rhythm    • Left flank pain    • Left kidney mass    • Localized osteoarthritis of left shoulder    • Lumbar foraminal stenosis    • Middle ear effusion    • Neural foraminal stenosis of cervical spine    • Nontoxic single thyroid nodule    • Osteopenia    • Other screening mammogram    • Pain in both lower extremities    • Pain in joint of right hip    • Pain in right buttock    • Pain of both hip joints    • Pain of upper extremity    • Polyp of sigmoid colon    • Rectal  pain    • Renal cyst, left    • Sciatic pain, right    • Short of breath on exertion    • Shoulder pain    • Skin lesion    • Swelling of lower extremity    • Throat mass    • Tinnitus, right    • Vitamin B12 deficiency    • Vitamin C deficiency    • Vulvovaginitis    • Weight loss        PAST SURGICAL HISTORY  Past Surgical History:   Procedure Laterality Date   • ANTERIOR CERVICAL DISCECTOMY W/ FUSION N/A 4/13/2016    Procedure: C-4-C-6 CERVICAL DISCECTOMY ANTERIOR FUSION WITH INSTRUMENTATION;  Surgeon: Blaine Jim DO;  Location: [facility];  Service:    • BACK SURGERY      Dr. Stringer 2001 and Dr. Warren 2002   • CHOLECYSTECTOMY     • COLONOSCOPY      4/6/17 Normal, 3/2013 with diverticuli Dr. Dacosta   • HERNIA REPAIR     • HYSTERECTOMY     • LUMBAR FUSION      L1-L3 fusion   • REPLACEMENT TOTAL KNEE Right    • SHOULDER SURGERY         FAMILY HISTORY  Family History   Problem Relation Age of Onset   • Heart failure Mother         CONGESTIVE   • Breast cancer Father    • Emphysema Father    • Hypertension Father    • Breast cancer Sister    • Other Sister         POLIO, 1963   • Diabetes Son    • Pancreatic cancer Son    • No Known Problems Other        SOCIAL HISTORY  Social History     Socioeconomic History   • Marital status:    Tobacco Use   • Smoking status: Never Smoker   • Smokeless tobacco: Never Used   Substance and Sexual Activity   • Alcohol use: No   • Drug use: No   • Sexual activity: Defer       ALLERGIES  Keflex  [cephalexin], Metoclopramide, Adhesive tape, and Pentazocine    REVIEW OF SYSTEMS  Review of Systems   Unable to perform ROS: Dementia       PHYSICAL EXAM  ED Triage Vitals [04/22/22 0318]   Temp Heart Rate Resp BP SpO2   97 °F (36.1 °C) 112 16 122/68 100 %      Temp src Heart Rate Source Patient Position BP Location FiO2 (%)   Tympanic Monitor -- -- --       Physical Exam  Vitals and nursing note reviewed.   Constitutional:       General: She is not in acute distress.  HENT:       Head: Normocephalic.   Eyes:      Pupils: Pupils are equal, round, and reactive to light.   Cardiovascular:      Rate and Rhythm: Normal rate and regular rhythm.      Heart sounds: Normal heart sounds.   Pulmonary:      Effort: Pulmonary effort is normal. No respiratory distress.      Breath sounds: Normal breath sounds.   Abdominal:      Palpations: Abdomen is soft.      Tenderness: There is no abdominal tenderness. There is no guarding or rebound.   Musculoskeletal:         General: Normal range of motion.      Cervical back: Normal range of motion and neck supple.   Skin:     General: Skin is warm and dry.      Findings: No rash.      Comments: Posterior scalp laceration   Neurological:      Mental Status: She is alert and oriented to person, place, and time.      Sensory: Sensation is intact.   Psychiatric:         Mood and Affect: Mood and affect normal.         LAB RESULTS  Lab Results (last 24 hours)     ** No results found for the last 24 hours. **          I ordered the above labs and reviewed the results    RADIOLOGY  CT Head Without Contrast   Final Result   No acute intracranial findings.       Radiation dose reduction techniques were utilized, including automated   exposure control and exposure modulation based on body size.       This report was finalized on 4/22/2022 4:27 AM by Dr. Marilynn Mcneil M.D.          CT Cervical Spine Without Contrast   Final Result       1. No acute fracture or subluxation of the cervical spine is seen.   2. The patient does have some wedging deformity of the superior endplate   of T1, age indeterminate. MRI or bone scan would be more sensitive for   assessment of acuity.       Radiation dose reduction techniques were utilized, including automated   exposure control and exposure modulation based on body size.       This report was finalized on 4/22/2022 4:37 AM by Dr. Marilynn Mcneil M.D.               I ordered the above noted radiological studies. Interpreted by  radiologist.  Reviewed by me in PACS.       PROCEDURES  Laceration Repair    Date/Time: 4/22/2022 5:40 AM  Performed by: Elroy Wagner MD  Authorized by: Elroy Wagner MD     Consent:     Consent obtained:  Emergent situation    Alternatives discussed:  No treatment  Universal protocol:     Procedure explained and questions answered to patient or proxy's satisfaction: yes      Relevant documents present and verified: yes      Test results available: yes      Imaging studies available: yes      Immediately prior to procedure, a time out was called: yes      Patient identity confirmed:  Verbally with patient and arm band  Anesthesia:     Anesthesia method:  Local infiltration    Local anesthetic:  Lidocaine 1% WITH epi  Laceration details:     Location:  Scalp    Scalp location:  Occipital    Length (cm):  3    Depth (mm):  1  Pre-procedure details:     Preparation:  Patient was prepped and draped in usual sterile fashion and imaging obtained to evaluate for foreign bodies  Exploration:     Limited defect created (wound extended): no      Hemostasis achieved with:  Direct pressure    Imaging outcome: foreign body not noted      Wound exploration: entire depth of wound visualized      Contaminated: no    Treatment:     Area cleansed with:  Povidone-iodine    Amount of cleaning:  Standard    Debridement:  None    Undermining:  None    Scar revision: no    Skin repair:     Repair method:  Staples    Number of staples:  8  Approximation:     Approximation:  Close  Repair type:     Repair type:  Simple  Post-procedure details:     Dressing:  Antibiotic ointment    Procedure completion:  Tolerated well, no immediate complications          PROGRESS AND CONSULTS     The patient was wearing a facemask upon entrance into the room and remained in such throughout their visit.  I was wearing PPE including a facemask, eye protection, as well as gloves at any point entering the room and throughout the visit    0520  On  reevaluation, the patient does seem quite confused however her vital signs are stable.  I informed her that her head and cervical spine CT showed no acute traumatic abnormalities.  We will clean and repair her laceration and she will be stable for discharge back to her nursing facility.      MEDICAL DECISION MAKING  Results were reviewed/discussed with the patient and they were also made aware of online access. Pt also made aware that some labs, such as cultures, will not be resulted during ER visit and follow up with PMD is necessary.     MDM  Number of Diagnoses or Management Options     Amount and/or Complexity of Data Reviewed  Tests in the radiology section of CPT®: ordered and reviewed  Tests in the medicine section of CPT®: ordered and reviewed  Review and summarize past medical records: yes (Upon reevaluation, the patient was last seen and evaluated on 2/7/2022 secondary to multiple falls)  Independent visualization of images, tracings, or specimens: yes (No acute traumatic abnormality seen on CT scan of the head/cervical spine)           DIAGNOSIS  Final diagnoses:   Fall, initial encounter   Closed head injury, initial encounter   Laceration of scalp, initial encounter   Dementia without behavioral disturbance, unspecified dementia type (HCC)       DISPOSITION  DISCHARGE    Patient discharged in stable condition.    Reviewed implications of results, diagnosis, meds, responsibility to follow up, warning signs and symptoms of possible worsening, potential complications and reasons to return to ER.    Patient/Family voiced understanding of above instructions.    Discussed plan for discharge, as there is no emergent indication for admission. Patient referred to primary care provider for BP management due to today's BP. Pt/family is agreeable and understands need for follow up and repeat testing.  Pt is aware that discharge does not mean that nothing is wrong but it indicates no emergency is present that  requires admission and they must continue care with follow-up as given below or physician of their choice.     FOLLOW-UP  Tabitha Lan MD  [address]  [facility] [address] [phone]    Schedule an appointment as soon as possible for a visit            Medication List      No changes were made to your prescriptions during this visit.           Latest Documented Vital Signs:  As of 06:14 EDT  BP- 131/69 HR- 82 Temp- 97 °F (36.1 °C) (Tympanic) O2 sat- 96%         Elroy Wagner MD  04/22/22 6785